# Patient Record
Sex: MALE | Race: BLACK OR AFRICAN AMERICAN | NOT HISPANIC OR LATINO | Employment: UNEMPLOYED | URBAN - METROPOLITAN AREA
[De-identification: names, ages, dates, MRNs, and addresses within clinical notes are randomized per-mention and may not be internally consistent; named-entity substitution may affect disease eponyms.]

---

## 2021-12-24 NOTE — PROGRESS NOTES
"Max Meade is a 41 year old male who is being evaluated via a billable video visit.      The patient has been notified of following:     \"This video visit will be conducted via a call between you and your physician/provider. We have found that certain health care needs can be provided without the need for an in-person physical exam.  This service lets us provide the care you need with a video conversation.  If a prescription is necessary we can send it directly to your pharmacy.  If lab work is needed we can place an order for that and you can then stop by our lab to have the test done at a later time.    Video visits are billed at different rates depending on your insurance coverage.  Please reach out to your insurance provider with any questions.    If during the course of the call the physician/provider feels a video visit is not appropriate, you will not be charged for this service.\"    Patient has given verbal consent for Video visit? Yes  How would you like to obtain your AVS? MyChart  Will anyone else be joining your video visit? No  {If patient encounters technical issues they should call [phone]      Video-Visit Details    Type of service:  Video Visit    Video Start Time: 12:28 PM  Video End Time: 1:11 PM    Originating Location (pt. Location): Home    Distant Location (provider location):  [facility] [place]     Platform used for Video Visit: [employer]    During this virtual visit the patient is located in [place], patient verifies this as the location during the entirety of this visit.     New Weight Management Nutrition Consultation    Max Meade is a 41 year old male presents today for new weight management nutrition consultation.  Patient referred by Dr. Hdz on December 27, 2021.    Patient with Co-morbidities of obesity including:  Type II DM yes (insulin dependant, managed inpatient currently)  Renal Failure no  Sleep apnea yes  Hypertension no " "  Dyslipidemia no  Joint pain yes  Back pain yes  GERD no     Anthropometrics:  Estimated body mass index is 81.19 kg/m  as calculated from the following:    Height as of an earlier encounter on 12/27/21: 1.93 m (6' 4\").    Weight as of an earlier encounter on 12/27/21: 302.5 kg (667 lb).    Medications for Weight Loss:  Pt states he discussed Ozempic with MD today.    NUTRITION HISTORY  See MD note for details.  Per questionnaire, strong emotional component to eating. Readiness and confidence to change, rated 10 (very ready).  Currently admitted (since March) at [facility] in [facility]. Preparing for weight loss surgery (pt describes the duodenal switch). Needs to lose to 580 lbs for surgery. His goal is to achieve this in next 3 months. Saw [facility] bariatric RD 6 times. Currently working with inpatient RD on weight loss. Has been following 2300 calories per day for last 3-4 months, and has not seen any wt loss, and has actually gained. Reports may be related to fluid retention.  He is getting 3 meals and 1 snack per day from the hospital. Reports no outside foods. He does not like hospital cooked veggies or greek yogurt.  Once per week slice of cake and once per day cookies.    Recent food recall:  Breakfast: Israeli toast, 1 butler, 1 sausage, 2 eggs  Lunch: Tuna sandwich + vegetables + grapes + rebecca dune cookie and chips and diet pop  Dinner: Chicken Burns + Rebecca Dune (150 ronna) chips(200 chips) + veggies (carrots and cucumber) and banana and diet pop  Snacks: vegetables and rebecca dune and diet pop  Beverages: water with crystal lite (4 cups) and diet pop (4 cups)    Alcohol: Never  Dining out: none currently     Physical Activity:  Doing PT/OT and exercise 3 times per week for 30 mins.     Nutrition Prescription  Recommended energy/nutrient modification.  1500 calories/day (per MD)    Nutrition Diagnosis  Obesity r/t long history of positive energy balance aeb BMI >30.    Nutrition " Intervention  Materials/education provided on 1500 calorie/day diet for weight loss, Volumetric eating to help satiety level on fewer calories; portion control and healthy food choices. Recommended pt focus on high-protein meals/snack (20-30 gm/meal or snack) and moderation of starches to help with weight loss and prepare for post-DS diet guidelines. Provided education on lean protein sources. Reviewed calorie goals per meal/snack to achieve 1500 calories per day diet. Pt inquiring about liquid diet for last 25 lbs of weight loss, discussed meal replacement criteria and need to discuss with provider to monitor BG and insulin needs to avoid hypoglycemia if using meal replacements.  Patient demonstrates understanding.    Expected Engagement: good  Follow-Up Plans: calorie goals     Nutrition Goals  1) Follow 1500 calorie/day plan   - 10 am: 500 calorie meal   - 2 pm: 400 calorie meal   - 5:30 pm: 400 calorie meal   - 10 pm: 200 ronna calorie snack  2) Aim for 20-30 grams protein per meal/snack  3) Continue to use only calorie-free beverages  4) Eat slowly (20-30 minutes per meal), chewing foods well (25 chews per bite/applesauce consistency)    Meal Replacement Shake Options:   *Protein Shake Criteria: no more than 210 Calories, at least 20 grams of protein, and less than 10 grams of sugar   [facility] smoothie (160 Calories, 20 g protein)   Premier Protein (160 Calories, 30 g protein)  Slim Fast Advanced Nutrition (180 Calories, 20 g protein)  Muscle Milk, lactose-free, 17 oz bottle (210 Calories, 30 g protein)  Integrated Supplements, no artificial sugars (110 Calories, 20 g protein)  Genepro, unflavored protein powder (60 Calories, 30 g protein)  Boost/Ensure Max (160 calories, 30 gm protein)   Fairlife Core Power (170 calories, 26 gm protein)    Meal Replacement Bar Options:  [facility] Protein Shake (160 Calories, 15 g protein)  Quest Protein Bars (190 Calories, 20 g protein)  Built Bar (170 Calories, 15-20 g  protein)  One Protein Bar (210 calories, 20 g protein)  [place] Signature Protein Bar ([employer]) (190 Calories, 21 g protein)  Pure Protein Bars (180 Calories, 21 g protein)      Protein Sources for Weight Loss  [email or web address]     Carbohydrates  [email or web address]     Mindful Eating  [email or web address]     Summary of Volumetrics Eating Plan  [email or web address]     Follow-Up:  1 month, PRN    Time spent with patient: 43 minutes.  Blossom Caal, JOSEPHINE, LD

## 2021-12-27 ENCOUNTER — VIRTUAL VISIT (OUTPATIENT)
Dept: ENDOCRINOLOGY | Facility: CLINIC | Age: 41
End: 2021-12-27
Payer: MEDICARE

## 2021-12-27 VITALS — HEIGHT: 76 IN | WEIGHT: 315 LBS | BODY MASS INDEX: 38.36 KG/M2

## 2021-12-27 DIAGNOSIS — E66.01 MORBID OBESITY (H): Primary | ICD-10-CM

## 2021-12-27 DIAGNOSIS — E66.9 OBESITY: ICD-10-CM

## 2021-12-27 DIAGNOSIS — Z71.3 NUTRITIONAL COUNSELING: Primary | ICD-10-CM

## 2021-12-27 PROCEDURE — 99203 OFFICE O/P NEW LOW 30 MIN: CPT | Mod: 95 | Performed by: INTERNAL MEDICINE

## 2021-12-27 PROCEDURE — 97802 MEDICAL NUTRITION INDIV IN: CPT | Mod: 95 | Performed by: DIETITIAN, REGISTERED

## 2021-12-27 RX ORDER — ATORVASTATIN CALCIUM 10 MG/1
10 TABLET, FILM COATED ORAL
COMMUNITY
Start: 2020-06-23

## 2021-12-27 RX ORDER — BUMETANIDE 1 MG/1
1 TABLET ORAL DAILY
COMMUNITY

## 2021-12-27 RX ORDER — AMOXICILLIN 250 MG
2 CAPSULE ORAL
COMMUNITY
End: 2023-05-15

## 2021-12-27 RX ORDER — LANOLIN ALCOHOL/MO/W.PET/CERES
3 CREAM (GRAM) TOPICAL
COMMUNITY
End: 2023-05-04

## 2021-12-27 RX ORDER — ALBUTEROL SULFATE 0.83 MG/ML
SOLUTION RESPIRATORY (INHALATION)
COMMUNITY
End: 2023-05-15

## 2021-12-27 RX ORDER — CYCLOBENZAPRINE HCL 5 MG
5 TABLET ORAL
COMMUNITY
Start: 2021-09-28 | End: 2022-10-03

## 2021-12-27 RX ORDER — PANTOPRAZOLE SODIUM 40 MG/1
40 TABLET, DELAYED RELEASE ORAL
COMMUNITY
End: 2023-05-15

## 2021-12-27 RX ORDER — TOPIRAMATE 25 MG/1
25 TABLET, FILM COATED ORAL
COMMUNITY
Start: 2021-09-28 | End: 2023-05-04

## 2021-12-27 RX ORDER — BUSPIRONE HYDROCHLORIDE 10 MG/1
10 TABLET ORAL
COMMUNITY
Start: 2021-09-28 | End: 2023-05-04

## 2021-12-27 RX ORDER — MINERAL OIL/HYDROPHIL PETROLAT
OINTMENT (GRAM) TOPICAL
COMMUNITY
End: 2023-05-15

## 2021-12-27 RX ORDER — BACLOFEN 20 MG
500 TABLET ORAL
COMMUNITY
Start: 2021-09-28 | End: 2023-05-15

## 2021-12-27 RX ORDER — GABAPENTIN 300 MG/1
300 CAPSULE ORAL
COMMUNITY
Start: 2021-09-28 | End: 2023-05-04

## 2021-12-27 RX ORDER — ZOLPIDEM TARTRATE 5 MG/1
5 TABLET ORAL
COMMUNITY
End: 2023-05-15

## 2021-12-27 RX ORDER — DULOXETIN HYDROCHLORIDE 60 MG/1
CAPSULE, DELAYED RELEASE ORAL
COMMUNITY
Start: 2020-11-25 | End: 2023-05-04

## 2021-12-27 RX ORDER — HYDROXYZINE PAMOATE 50 MG/1
50 CAPSULE ORAL 4 TIMES DAILY PRN
COMMUNITY

## 2021-12-27 RX ORDER — ACETAMINOPHEN 500 MG
1000 TABLET ORAL
COMMUNITY
Start: 2021-09-28 | End: 2023-05-04

## 2021-12-27 RX ORDER — LACTULOSE 10 G/15ML
60 SOLUTION ORAL
COMMUNITY
End: 2023-05-15

## 2021-12-27 RX ORDER — INSULIN GLARGINE 100 [IU]/ML
78 INJECTION, SOLUTION SUBCUTANEOUS AT BEDTIME
COMMUNITY
Start: 2021-09-28 | End: 2023-05-15

## 2021-12-27 RX ORDER — FAMOTIDINE 20 MG/1
20 TABLET, FILM COATED ORAL
COMMUNITY
End: 2023-05-04

## 2021-12-27 ASSESSMENT — PAIN SCALES - GENERAL: PAINLEVEL: SEVERE PAIN (7)

## 2021-12-27 ASSESSMENT — MIFFLIN-ST. JEOR: SCORE: 4021.99
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
## 2021-12-27 NOTE — PATIENT INSTRUCTIONS
Joe Santana,    Follow-up with RD in 1 month (1/24).     Thank you,    Blossom Caal, RD, LD  If you would like to schedule or reschedule an appointment with the RD, please call [phone]    Nutrition Goals  1) Follow 1500 calorie/day plan   - 10 am: 500 calorie meal   - 2 pm: 400 calorie meal   - 5:30 pm: 400 calorie meal   - 10 pm: 200 ronna calorie snack  2) Aim for 20-30 grams protein per meal/snack  3) Continue to use only calorie-free beverages  4) Eat slowly (20-30 minutes per meal), chewing foods well (25 chews per bite/applesauce consistency)    Protein Sources for Weight Loss  [email or web address]     Carbohydrates  [email or web address]     Mindful Eating  [email or web address]     Summary of Volumetrics Eating Plan  [email or web address]         Interested in working with a health ?  Health coaches work with you to improve your overall health and wellbeing.  They look at the whole person, and may involve discussion of different areas of life, including, but not limited to the four pillars of health (sleep, exercise, nutrition, and stress management). Discuss with your care team if you would like to start working a health .    Health Coaching-3 Pack:    $99 for three health coaching visits    Visits may be done in person or via phone    Coaching is a partnership between the  and the client; Coaches do not prescribe or diagnose    Coaching helps inspire the client to reach his/her personal goals      COMPREHENSIVE WEIGHT MANAGEMENT PROGRAM  VIRTUAL SUPPORT GROUPS    For Support Group Information:      We offer support groups for patients who are working on weight loss and considering, preparing for or have had weight loss surgery.   There is no cost for this opportunity.  You are invited to attend the?Virtual Support Groups?provided by any of the following locations:    1. [email or web address] via Microsoft Teams with Sarah Ceja RN  2.   [employer] via [employer]  "with Obed Davenport, PhD, LP  3.   [facility] via sabio [employer] Teams with Gabi Winchester RN  4.   [facility] via sabio [employer] Teams with Gabi Walters [facility]    The following Support Group information can also be found on our website:  [email or web address]      [facility] Weight Loss Surgery Support Group    [facility] Weight Loss Surgery Support Group  The support group is a patient-lead forum that meets monthly to share experiences, encouragement and education. It is open to those who have had weight loss surgery, are scheduled for surgery, and those who are considering surgery.   WHEN: This group meets on the 3rd Wednesday of each month from 5:00PM - 6:00PM virtually using Microsoft Teams.   FACILITATOR: Led by Sarah Ceja RD, LD, RN, the program's Clinical Coordinator.   TO REGISTER: Please contact the clinic via Mem[employer] or call the nurse line directly at [phone] to inform our staff that you would like an invite sent to you and to let us know the email you would like the invite sent to. Prior to the meeting, a link with directions on how to join the meeting will be sent to you.    2021 Meetings August 18: \"Let's Talk\" a time for the group to share.  September 15: \"Let's Talk\" a time for the group to share.  October 20: \"Let's Talk\" a time for the group to share.  November 17: Guest Speaker: Glo Burch RD, LD \"Protein, Metabolism and Meal Planning\"  December 15: Guest Speaker: Zay Gabriel RD, LD will speak, \"Recipe Modification\"    2022 Meetings January 19 February 16 March 16: Guest Speakers: Rosina Polanco, PhD,LP and Katrin Marie PsyD,LP  April 20  May 18  Geraldine 15    [facility] Support Groups    Connections: Bariatric Care Support Group?  This is open to all [facility] (and those external to this program) pre- and post- operative bariatric surgery patients as " "well as their support system.   WHEN: This group meets the 2nd Tuesday of each month from 6:30 PM - 8:00 PM virtually using Microsoft Teams.   FACILITATOR: Led by Obed Davenport, Ph.D who is a Licensed Psychologist with the [facility] Comprehensive Weight Management Program.   TO REGISTER: Please send an email to Obed Davenport, Ph.D., LP at?[email or web address]?if you would like an invitation to the group and to learn about using Microsoft Teams.    2021 Meetings  August 10: Open Forum  September 14: Guest Speaker: Gabi Winchester RN,CBN, CIC, [facility], \"Your Hospital Stay and Post-Operative Compliance\"  October 12: Open Forum  November 9: Guest Speaker: Jennifer Frank RD,LD, [facility],\"Holiday Eating\".  December 14: Guest Speaker Twila Valle MD, MPH, [facility], [facility], \"Body Contouring Surgery for Bariatric Surgery Patients\"     2022 Meetings  January 11  February 8  March 8  April 12  May 10  Geraldine 14    Connections: Post-Operative Bariatric Surgery Support Group  This is a support group for [facility] bariatric patients (and those external to [facility]) who have had bariatric surgery and are at least 3 months post-surgery.  WHEN: This support group meets the 4th Wednesday of the month from 11:00 AM - 12:00 PM virtually using Microsoft Teams.   FACILITATOR: Led by Certified Bariatric Nurse, Gabi Winchester RN.   TO REGISTER: Please send an email to Gabi at [email or web address] if you would like an invitation to the group and to learn about using Microsoft Teams.    2022 Meetings  January 26  February 23  March 23  April 27  May 25  Geraldine 22    [facility] Healthy Lifestyle Virtual Support Group    Healthy Lifestyle Virtual Support Group?  This is 60 minutes of small group guided discussion, support and resources. All are welcome who want " "a healthy lifestyle.  WHEN: This group meets monthly on a Friday from 12:30 PM - 1:30 PM virtually using Microsoft Teams.   FACILITATOR: Led by National Board Certified Health and , Gabi Walters [facility].   TO REGISTER: Please send an email to Gabi [email or web address] to receive monthly invites to the group or if you have any questions about having a health .  Prior to the meeting, a link with directions on how to join the meeting will be sent to you.    2021 Meetings  August 27: Open Forum  September 24: Sleep and the 7 Types of Rest  October 29: Open Forum  November 19: Gratitude     December 10: Open Forum    2022 Meetings  January 21: Laura Centeno MS, RN, CIC, CBN, \"Healthy Habits\"  February 25: Open Forum  March 18: \"Setting Limits and Boundaries\"  April 29: Ale Hu RD, \"Meal Planning Made Easy\"  May 20: Open Forum  June: To be determined          "

## 2021-12-27 NOTE — LETTER
Date:December 29, 2021      Provider requested that no letter be sent. Do not send.       [facility]

## 2021-12-27 NOTE — LETTER
Date:December 28, 2021      Patient was self referred, no letter generated. Do not send.        [facility] Information

## 2021-12-27 NOTE — PROGRESS NOTES
Max is a 41 year old who is being evaluated via a billable video visit.      How would you like to obtain your AVS? MyChart  If the video visit is dropped, the invitation should be resent by: [phone]  Will anyone else be joining your video visit? No    During this virtual visit the patient is located in [place], patient verifies this as the location during the entirety of this visit.     Video-Visit Details    Type of service:  Video Visit    Start: 12/27/2021 08:57 am  Stop: 12/27/2021 09:20 am    Originating Location (pt. Location): [facility]    Distant Location (provider location):  [facility]NA[facility] [place]     Platform used for Video Visit: ElaWell
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
## 2021-12-27 NOTE — NURSING NOTE
"(   Chief Complaint   Patient presents with     New Patient     New MWM. BMI: 77.2    )    ( Weight: (!) 302.5 kg (667 lb) (Patient reported) )  ( Height: 193 cm (6' 4\") )  ( BMI (Calculated): 81.19 )  (   )  (   )  (   )  (   )  (   )  (   )    (   )  (   )  (   )  (   )  (   )  (   )  (   )    ( There is no problem list on file for this patient.   )  (   Current Outpatient Medications   Medication Sig Dispense Refill     acetaminophen (TYLENOL) 500 MG tablet Take 1,000 mg by mouth       albuterol (PROVENTIL) (2.5 MG/3ML) 0.083% neb solution        apixaban ANTICOAGULANT (ELIQUIS) 2.5 MG tablet Take 2.5 mg by mouth       atorvastatin (LIPITOR) 10 MG tablet Take 10 mg by mouth       bumetanide (BUMEX) 1 MG tablet Take 1 mg by mouth       busPIRone (BUSPAR) 10 MG tablet Take 10 mg by mouth       cholecalciferol 25 MCG (1000 UT) TABS Take 25 mcg by mouth       cyclobenzaprine (FLEXERIL) 5 MG tablet Take 5 mg by mouth       DULoxetine (CYMBALTA) 60 MG capsule TAKE 2 CAPSULES BY MOUTH ONCE DAILY       famotidine (PEPCID) 20 MG tablet Take 20 mg by mouth       gabapentin (NEURONTIN) 300 MG capsule Take 300 mg by mouth       hydrOXYzine (VISTARIL) 25 MG capsule Take 25 mg by mouth       insulin aspart (NOVOLOG VIAL) 100 UNITS/ML vial Inject 24 Units Subcutaneous       insulin glargine (LANTUS VIAL) 100 UNIT/ML vial Inject 47 Units Subcutaneous       insulin lispro (HUMALOG VIAL) 100 UNIT/ML vial Inject 2-10 Units Subcutaneous       lactulose (CHRONULAC) 10 GM/15ML solution Take 60 mLs by mouth       Magnesium Oxide 500 MG TABS Take 500 mg by mouth       melatonin 3 MG tablet Take 3 mg by mouth       mineral oil-hydrophilic petrolatum (AQUAPHOR) external ointment        Multiple Vitamins-Minerals (MULTI VITAMIN  /MINERALS) TABS Take 1 tablet by mouth       naloxone (NARCAN) 4 MG/0.1ML nasal spray Spray 1 spray (4 mg) into one nostril 1 time; may repeat in opposite nostril in 2 minutes if person does not respond.       " pantoprazole (PROTONIX) 40 MG EC tablet Take 40 mg by mouth       senna-docusate (SENOKOT-S/PERICOLACE) 8.6-50 MG tablet Take 2 tablets by mouth       topiramate (TOPAMAX) 25 MG tablet Take 25 mg by mouth       zolpidem (AMBIEN) 5 MG tablet Take 5 mg by mouth      )  ( Diabetes Eval:    )    ( Pain Eval:  Severe Pain (7) )    ( Wound Eval:       )    (   History   Smoking Status     Never Smoker   Smokeless Tobacco     Not on file    )    ( Signed By:  Rea Multani, EMT; December 27, 2021; 8:45 AM )

## 2022-01-10 ENCOUNTER — VIRTUAL VISIT (OUTPATIENT)
Dept: ENDOCRINOLOGY | Facility: CLINIC | Age: 42
End: 2022-01-10
Payer: MEDICARE

## 2022-01-10 VITALS — WEIGHT: 315 LBS | HEIGHT: 75 IN | BODY MASS INDEX: 39.17 KG/M2

## 2022-01-10 DIAGNOSIS — E66.01 MORBID OBESITY (H): Primary | ICD-10-CM

## 2022-01-10 PROCEDURE — 99213 OFFICE O/P EST LOW 20 MIN: CPT | Mod: 95 | Performed by: INTERNAL MEDICINE

## 2022-01-10 ASSESSMENT — PATIENT HEALTH QUESTIONNAIRE - PHQ9: SUM OF ALL RESPONSES TO PHQ QUESTIONS 1-9: 10

## 2022-01-10 ASSESSMENT — PAIN SCALES - GENERAL: PAINLEVEL: SEVERE PAIN (7)

## 2022-01-10 ASSESSMENT — MIFFLIN-ST. JEOR: SCORE: 3956.23

## 2022-01-10 NOTE — NURSING NOTE
"Chief Complaint   Patient presents with     DONNELL Santana, is participating in a virtual visit today for a follow up.       Vitals:    01/10/22 1109   Weight: (!) 297.6 kg (656 lb)   Height: 1.905 m (6' 3\")       Body mass index is 81.99 kg/m .      Sudha Hoover LPN    "
Resident

## 2022-01-10 NOTE — PROGRESS NOTES
"    Return Medical Weight Management Note     Max Meade  MRN:  [id number]  :  1980  JOSEFINA:  1/10/2022    Dear Colleagues,    I had the pleasure of seeing your patient Max Meade.  He is a 41 year old male who I am continuing to see for treatment of obesity related to:     2021   I have the following health issues associated with obesity: Type II Diabetes, Sleep Apnea   I have the following symptoms associated with obesity: Knee Pain, Depression, Lower Extremity Swelling, Back Pain, Fatigue     CURRENT WEIGHT:   656 lbs 0 oz    Wt Readings from Last 4 Encounters:   01/10/22 (!) 297.6 kg (656 lb)   21 (!) 302.5 kg (667 lb)     Height:  6' 3\"  Body Mass Index:  Body mass index is 81.99 kg/m .  Vitals:  B/P: Data Unavailable, P: Data Unavailable    Initial consult weight was 667 on down 11.  Weight change since last seen on 2021 is down 11 pounds.   Total loss is 11 pounds.    INTERVAL HISTORY:  [place] has implemented 1500 calories diet now in hospital and restarted semaglutide, initally at 0.25/week.    Diet Recall Review with Patient 2021   Do you typically eat breakfast? Yes   If you do eat breakfast, what types of food do you eat? Vietnamese toast,butler, sausage, eggs   Do you typically eat lunch? Yes   If you do eat lunch, what types of food do you typically eat?  Tuna sandwich   Do you typically eat supper? Yes   If you do eat supper, what types of food do you typically eat? Chicken [place]   Do you typically eat snacks? No   Do you like vegetables?  No   Do you drink water? Yes   How many glasses of juice do you drink in a typical day? 0   How many of glasses of milk do you drink in a typical day? 0   If you do drink milk, what type? N/A   How many 8oz glasses of sugar containing drinks such as Simba-Aid/sweet tea do you drink in a day? 0   How many cans/bottles of sugar pop/soda/tea/sports drinks do you drink in a day? 0   How many cans/bottles of diet pop/soda/tea or " sports drink do you drink in a day? 7   How often do you have a drink of alcohol? Never       MEDICATIONS:   Current Outpatient Medications   Medication     albuterol (PROVENTIL) (2.5 MG/3ML) 0.083% neb solution     apixaban ANTICOAGULANT (ELIQUIS) 2.5 MG tablet     atorvastatin (LIPITOR) 10 MG tablet     bumetanide (BUMEX) 1 MG tablet     busPIRone (BUSPAR) 10 MG tablet     cholecalciferol 25 MCG (1000 UT) TABS     cyclobenzaprine (FLEXERIL) 5 MG tablet     DULoxetine (CYMBALTA) 60 MG capsule     famotidine (PEPCID) 20 MG tablet     gabapentin (NEURONTIN) 300 MG capsule     hydrOXYzine (VISTARIL) 25 MG capsule     insulin aspart (NOVOLOG VIAL) 100 UNITS/ML vial     insulin glargine (LANTUS VIAL) 100 UNIT/ML vial     insulin lispro (HUMALOG VIAL) 100 UNIT/ML vial     lactulose (CHRONULAC) 10 GM/15ML solution     Magnesium Oxide 500 MG TABS     melatonin 3 MG tablet     mineral oil-hydrophilic petrolatum (AQUAPHOR) external ointment     Multiple Vitamins-Minerals (MULTI VITAMIN  /MINERALS) TABS     naloxone (NARCAN) 4 MG/0.1ML nasal spray     pantoprazole (PROTONIX) 40 MG EC tablet     senna-docusate (SENOKOT-S/PERICOLACE) 8.6-50 MG tablet     topiramate (TOPAMAX) 25 MG tablet     zolpidem (AMBIEN) 5 MG tablet     acetaminophen (TYLENOL) 500 MG tablet     No current facility-administered medications for this visit.     No flowsheet data found.    ASSESSMENT:   Maintain 1500 calories diet in hospital and increase semaglutide to 0.5/week, increase to 1 mg/week asap. Will discuss barisurgery with surgery colleagues, but likely will need some weight loss before can consider surgery.    FOLLOW-UP:    12 weeks.    Sincerely,  Erlin Hdz MD

## 2022-01-10 NOTE — LETTER
"1/10/2022       RE: Max Meade  [address]  [place] [address]     Dear Colleague,    Thank you for referring your patient, Max Meade, to the [facility] [place] at [facility]. Please see a copy of my visit note below.    Max is a 41 year old who is being evaluated via a billable video visit.      How would you like to obtain your AVS? MyChart  If the video visit is dropped, the invitation should be resent by: Text to cell phone: [phone]  Will anyone else be joining your video visit? No     Video-Visit Details    Type of service:  Video Visit    Start: 01/10/2022 11:17 am  Stop: 01/10/2022 11:30 am    Originating Location (pt. Location): [facility]    Distant Location (provider location):  [facility] [place]     Platform used for Video Visit: BAROnova          [facility] Medical Weight Management Note     Max Meade  MRN:  [id number]  :  1980  JOSEFINA:  1/10/2022    Dear Colleagues,    I had the pleasure of seeing your patient Max Meade.  He is a 41 year old male who I am continuing to see for treatment of obesity related to:     2021   I have the following health issues associated with obesity: Type II Diabetes, Sleep Apnea   I have the following symptoms associated with obesity: Knee Pain, Depression, Lower Extremity Swelling, Back Pain, Fatigue     CURRENT WEIGHT:   656 lbs 0 oz    Wt Readings from Last 4 Encounters:   01/10/22 (!) 297.6 kg (656 lb)   21 (!) 302.5 kg (667 lb)     Height:  6' 3\"  Body Mass Index:  Body mass index is 81.99 kg/m .  Vitals:  B/P: Data Unavailable, P: Data Unavailable    Initial consult weight was 667 on down 11.  Weight change since last seen on 2021 is down 11 pounds.   Total loss is 11 pounds.    INTERVAL HISTORY:  [place] has implemented 1500 calories diet now in hospital and " restarted semaglutide, initally at 0.25/week.    Diet Recall Review with Patient 12/21/2021   Do you typically eat breakfast? Yes   If you do eat breakfast, what types of food do you eat? Vietnamese toast,butler, sausage, eggs   Do you typically eat lunch? Yes   If you do eat lunch, what types of food do you typically eat?  Tuna sandwich   Do you typically eat supper? Yes   If you do eat supper, what types of food do you typically eat? Chicken [place]   Do you typically eat snacks? No   Do you like vegetables?  No   Do you drink water? Yes   How many glasses of juice do you drink in a typical day? 0   How many of glasses of milk do you drink in a typical day? 0   If you do drink milk, what type? N/A   How many 8oz glasses of sugar containing drinks such as Simba-Aid/sweet tea do you drink in a day? 0   How many cans/bottles of sugar pop/soda/tea/sports drinks do you drink in a day? 0   How many cans/bottles of diet pop/soda/tea or sports drink do you drink in a day? 7   How often do you have a drink of alcohol? Never       MEDICATIONS:   Current Outpatient Medications   Medication     albuterol (PROVENTIL) (2.5 MG/3ML) 0.083% neb solution     apixaban ANTICOAGULANT (ELIQUIS) 2.5 MG tablet     atorvastatin (LIPITOR) 10 MG tablet     bumetanide (BUMEX) 1 MG tablet     busPIRone (BUSPAR) 10 MG tablet     cholecalciferol 25 MCG (1000 UT) TABS     cyclobenzaprine (FLEXERIL) 5 MG tablet     DULoxetine (CYMBALTA) 60 MG capsule     famotidine (PEPCID) 20 MG tablet     gabapentin (NEURONTIN) 300 MG capsule     hydrOXYzine (VISTARIL) 25 MG capsule     insulin aspart (NOVOLOG VIAL) 100 UNITS/ML vial     insulin glargine (LANTUS VIAL) 100 UNIT/ML vial     insulin lispro (HUMALOG VIAL) 100 UNIT/ML vial     lactulose (CHRONULAC) 10 GM/15ML solution     Magnesium Oxide 500 MG TABS     melatonin 3 MG tablet     mineral oil-hydrophilic petrolatum (AQUAPHOR) external ointment     Multiple Vitamins-Minerals (MULTI VITAMIN  /MINERALS) TABS      naloxone (NARCAN) 4 MG/0.1ML nasal spray     pantoprazole (PROTONIX) 40 MG EC tablet     senna-docusate (SENOKOT-S/PERICOLACE) 8.6-50 MG tablet     topiramate (TOPAMAX) 25 MG tablet     zolpidem (AMBIEN) 5 MG tablet     acetaminophen (TYLENOL) 500 MG tablet     No current facility-administered medications for this visit.     No flowsheet data found.    ASSESSMENT:   Maintain 1500 calories diet in hospital and increase semaglutide to 0.5/week, increase to 1 mg/week asap. Will discuss barisurgery with surgery colleagues, but likely will need some weight loss before can consider surgery.    FOLLOW-UP:    12 weeks.    Sincerely,  Erlin Hdz MD      Again, thank you for allowing me to participate in the care of your patient.      Sincerely,    Erlin Hdz MD

## 2022-01-10 NOTE — PROGRESS NOTES
Max is a 41 year old who is being evaluated via a billable video visit.      How would you like to obtain your AVS? Blaasthart  If the video visit is dropped, the invitation should be resent by: Text to cell phone: [phone]  Will anyone else be joining your video visit? No     Video-Visit Details    Type of service:  Video Visit    Start: 01/10/2022 11:17 am  Stop: 01/10/2022 11:30 am    Originating Location (pt. Location): [facility]    Distant Location (provider location):  [facility] [place]     Platform used for Video Visit: Liveclubs

## 2022-01-21 NOTE — PROGRESS NOTES
"Max Meade is a 41 year old male who is being evaluated via a billable video visit.      The patient has been notified of following:     \"This video visit will be conducted via a call between you and your physician/provider. We have found that certain health care needs can be provided without the need for an in-person physical exam.  This service lets us provide the care you need with a video conversation.  If a prescription is necessary we can send it directly to your pharmacy.  If lab work is needed we can place an order for that and you can then stop by our lab to have the test done at a later time.    Video visits are billed at different rates depending on your insurance coverage.  Please reach out to your insurance provider with any questions.    If during the course of the call the physician/provider feels a video visit is not appropriate, you will not be charged for this service.\"    Patient has given verbal consent for Video visit? Yes  How would you like to obtain your AVS? MyChart  Will anyone else be joining your video visit? No  {If patient encounters technical issues they should call [phone]      Video-Visit Details    Type of service:  Video Visit    Video Start Time: 12:35 PM  Video End Time: 1:05 PM    Originating Location (pt. Location): Home    Distant Location (provider location):  [facility] [place]     Platform used for Video Visit: Platogo    During this virtual visit the patient is located in [place], patient verifies this as the location during the entirety of this visit.     Weight Management Nutrition Consultation    Max Meade is a 41 year old male presents today for return weight management nutrition consultation.  Patient referred by Dr. Hdz on December 27, 2021.    Patient with Co-morbidities of obesity including:  Type II DM yes (insulin dependant, managed inpatient currently)  Renal Failure no  Sleep apnea yes  Hypertension no " "  Dyslipidemia no  Joint pain yes  Back pain yes  GERD no     Anthropometrics:  Admitted in March. Notes he started working on weight loss at this time. Weight per care everywhere:   321 kg (707 lb 10.8 oz) 03/18/2021      Per notes from [facility] RD:  12/13/21 (!) 293.8 kg (647 lb 12.8 oz)   12/08/21 (!) 287.9 kg (634 lb 12.8 oz)   09/27/21 (!) 292.2 kg (644 lb 3.2 oz)     Estimated body mass index is 81.19 kg/m  as calculated from the following:    Height as of an earlier encounter on 12/27/21: 1.93 m (6' 4\").    Weight as of an earlier encounter on 12/27/21: 302.5 kg (667 lb).    Current weight (per pt report): 648 lbs (-19 lbs over the past month)    Medications for Weight Loss:  Ozempic     NUTRITION HISTORY  See MD note for details.  Per questionnaire, strong emotional component to eating. Readiness and confidence to change, rated 10 (very ready).    Saw [facility] bariat[facility] RD 6 times, last visit 12/16/21. Per last note - Preparing for weight loss surgery. Needs to lose to 540 lbs for surgery (-70 lbs from initial consult). Pt states his goal is to achieve this in next 3 months.    Currently admitted (since March) at [facility] in [facility] and working with inpatient RD on weight loss. Had been encouraged to follow 2300 calories/day for last 3-4 months, and had not seen any wt loss, and has actually gained (~20 lbs). Reports may be related to fluid retention.    He is getting 3 meals and 1 snack per day from the hospital. Reports no outside foods. He does not like hospital cooked veggies or greek yogurt.  Once per week slice of cake and once per day cookies.  Does not tolerate regular milk.     Today - Pt reports he has lost 19 lbs since initial visit. He was following 1500 calories/day until 1/11, then started following 1350 calories/day. Repots no outside food from hospital meals and snacks. Rolling and moving better. Is able to stand for a minute which he was not doing " before. Doing arm movements while in bed. He is interested in weight loss surgery consult with [facility].     1350 calories/day:  Breakfast: 3 pc Luxembourgish toast + 2 eggs + 1 pc sausage + 1 pc butler  12: fruit  2pm: protein shake with grapes  4pm: veggies  Dinner: protein shake with veggies  11 pm: veggies with string cheese  Beverages: water/crystallite (5-6 x 8 oz/day)      Progress Towards Previous Goals:  1) Follow 1500 calorie/day plan - Pt reports he has been following since initial and even decreased to 1350 a couple weeks ago.   - 10 am: 500 calorie meal   - 2 pm: 400 calorie meal   - 5:30 pm: 400 calorie meal   - 10 pm: 200 ronna calorie snack  2) Aim for 20-30 grams protein per meal/snack - Met, continues  3) Continue to use only calorie-free beverages - Met, continues   4) Eat slowly (20-30 minutes per meal), chewing foods well (25 chews per bite/applesauce consistency) - Met, continues      Physical Activity:  Doing PT/OT and exercise 3 times per week for 30 mins. Doing seated exercises while in bed.     Nutrition Prescription  Recommended energy/nutrient modification.  1500 calories/day (per MD)    Nutrition Diagnosis  Obesity r/t long history of positive energy balance aeb BMI >30.    Nutrition Intervention  Materials/education provided:  - Reviewed progress towards previous goals  - Reviewed bariatric diet guidelines - eating/drinking pace, portion control, protein needs, vitamin/mineral supplement needs.    - Encouraged increased fluid intake  - Praised pt on weight loss this past month and consistency with hypocaloric diet.   Patient demonstrates understanding.    Expected Engagement: good  Follow-Up Plans: calorie goals     Nutrition Goals  1) Follow 1350 calorie/day plan   - Continue to replace 2 meals per day with low-calorie protein shake.  2) Aim for 20-30 grams protein per meal/snack  3) Continue to use only calorie-free beverages. Consume 64+ oz fluid/day.   4) Eat slowly (20-30 minutes per meal),  chewing foods well (25 chews per bite/applesauce consistency)    Meal Replacement Shake Options:   *Protein Shake Criteria: no more than 210 Calories, at least 20 grams of protein, and less than 10 grams of sugar   [facility] smoothie (160 Calories, 20 g protein)   Premier Protein (160 Calories, 30 g protein)  Slim Fast Advanced Nutrition (180 Calories, 20 g protein)  Muscle Milk, lactose-free, 17 oz bottle (210 Calories, 30 g protein)  Integrated Supplements, no artificial sugars (110 Calories, 20 g protein)  Genepro, unflavored protein powder (60 Calories, 30 g protein)  Boost/Ensure Max (160 calories, 30 gm protein)   [employer] Core Power (170 calories, 26 gm protein)    Meal Replacement Bar Options:  [facility] Protein Shake (160 Calories, 15 g protein)  Quest Protein Bars (190 Calories, 20 g protein)  Built Bar (170 Calories, 15-20 g protein)  One Protein Bar (210 calories, 20 g protein)  [employer]man Signature Protein Bar ([employer]) (190 Calories, 21 g protein)  Pure Protein Bars (180 Calories, 21 g protein)      Protein Sources for Weight Loss  [email or web address]     Carbohydrates  [email or web address]     Mindful Eating  [email or web address]     Diet Guidelines after Weight Loss Surgery  [email or web address]     Seated Exercises for Arms and Legs (can be done before or after surgery)  [email or web address]      Follow-Up:  1 month, PRN    Time spent with patient: 30 minutes.  Blossom Caal, RD, LD

## 2022-01-24 ENCOUNTER — VIRTUAL VISIT (OUTPATIENT)
Dept: ENDOCRINOLOGY | Facility: CLINIC | Age: 42
End: 2022-01-24
Payer: MEDICARE

## 2022-01-24 DIAGNOSIS — Z71.3 NUTRITIONAL COUNSELING: Primary | ICD-10-CM

## 2022-01-24 DIAGNOSIS — E66.9 OBESITY: ICD-10-CM

## 2022-01-24 PROCEDURE — 97803 MED NUTRITION INDIV SUBSEQ: CPT | Mod: 95 | Performed by: DIETITIAN, REGISTERED

## 2022-01-24 NOTE — LETTER
"1/24/2022       RE: Max Meade  [address]  [place] [address]     Dear Colleague,    Thank you for referring your patient, Max Meade, to the [facility] [place] at [facility]. Please see a copy of my visit note below.    Max Meade is a 41 year old male who is being evaluated via a billable video visit.      The patient has been notified of following:     \"This video visit will be conducted via a call between you and your physician/provider. We have found that certain health care needs can be provided without the need for an in-person physical exam.  This service lets us provide the care you need with a video conversation.  If a prescription is necessary we can send it directly to your pharmacy.  If lab work is needed we can place an order for that and you can then stop by our lab to have the test done at a later time.    Video visits are billed at different rates depending on your insurance coverage.  Please reach out to your insurance provider with any questions.    If during the course of the call the physician/provider feels a video visit is not appropriate, you will not be charged for this service.\"    Patient has given verbal consent for Video visit? Yes  How would you like to obtain your AVS? MyChart  Will anyone else be joining your video visit? No  {If patient encounters technical issues they should call [phone]      Video-Visit Details    Type of service:  Video Visit    Video Start Time: 12:35 PM  Video End Time: 1:05 PM    Originating Location (pt. Location): Home    Distant Location (provider location):  [facility] [place]     Platform used for Video Visit: [employer]    During this virtual visit the patient is located in [place], patient verifies this as the location during the entirety of this visit.     Weight Management Nutrition " "Consultation    Max Meade is a 41 year old male presents today for return weight management nutrition consultation.  Patient referred by Dr. Hdz on December 27, 2021.    Patient with Co-morbidities of obesity including:  Type II DM yes (insulin dependant, managed inpatient currently)  Renal Failure no  Sleep apnea yes  Hypertension no   Dyslipidemia no  Joint pain yes  Back pain yes  GERD no     Anthropometrics:  Admitted in March. Notes he started working on weight loss at this time. Weight per care everywhere:   321 kg (707 lb 10.8 oz) 03/18/2021      Per notes from [facility] Bariatric RD:  12/13/21 (!) 293.8 kg (647 lb 12.8 oz)   12/08/21 (!) 287.9 kg (634 lb 12.8 oz)   09/27/21 (!) 292.2 kg (644 lb 3.2 oz)     Estimated body mass index is 81.19 kg/m  as calculated from the following:    Height as of an earlier encounter on 12/27/21: 1.93 m (6' 4\").    Weight as of an earlier encounter on 12/27/21: 302.5 kg (667 lb).    Current weight (per pt report): 648 lbs (-19 lbs over the past month)    Medications for Weight Loss:  Ozempic     NUTRITION HISTORY  See MD note for details.  Per questionnaire, strong emotional component to eating. Readiness and confidence to change, rated 10 (very ready).    Saw [facility] bariatric RD 6 times, last visit 12/16/21. Per last note - Preparing for weight loss surgery. Needs to lose to 540 lbs for surgery (-70 lbs from initial consult). Pt states his goal is to achieve this in next 3 months.    Currently admitted (since March) at [facility] in [facility] and working with inpatient RD on weight loss. Had been encouraged to follow 2300 calories/day for last 3-4 months, and had not seen any wt loss, and has actually gained (~20 lbs). Reports may be related to fluid retention.    He is getting 3 meals and 1 snack per day from the hospital. Reports no outside foods. He does not like hospital cooked veggies or greek yogurt.  Once per week slice of cake " and once per day cookies.  Does not tolerate regular milk.     Today - Pt reports he has lost 19 lbs since initial visit. He was following 1500 calories/day until 1/11, then started following 1350 calories/day. Repots no outside food from hospital meals and snacks. Rolling and moving better. Is able to stand for a minute which he was not doing before. Doing arm movements while in bed. He is interested in weight loss surgery consult with [facility].     1350 calories/day:  Breakfast: 3 pc Montserratian toast + 2 eggs + 1 pc sausage + 1 pc butler  12: fruit  2pm: protein shake with grapes  4pm: veggies  Dinner: protein shake with veggies  11 pm: veggies with string cheese  Beverages: water/crystallite (5-6 x 8 oz/day)      Progress Towards Previous Goals:  1) Follow 1500 calorie/day plan - Pt reports he has been following since initial and even decreased to 1350 a couple weeks ago.   - 10 am: 500 calorie meal   - 2 pm: 400 calorie meal   - 5:30 pm: 400 calorie meal   - 10 pm: 200 ronna calorie snack  2) Aim for 20-30 grams protein per meal/snack - Met, continues  3) Continue to use only calorie-free beverages - Met, continues   4) Eat slowly (20-30 minutes per meal), chewing foods well (25 chews per bite/applesauce consistency) - Met, continues      Physical Activity:  Doing PT/OT and exercise 3 times per week for 30 mins. Doing seated exercises while in bed.     Nutrition Prescription  Recommended energy/nutrient modification.  1500 calories/day (per MD)    Nutrition Diagnosis  Obesity r/t long history of positive energy balance aeb BMI >30.    Nutrition Intervention  Materials/education provided:  - Reviewed progress towards previous goals  - Reviewed bariatric diet guidelines - eating/drinking pace, portion control, protein needs, vitamin/mineral supplement needs.    - Encouraged increased fluid intake  - Praised pt on weight loss this past month and consistency with hypocaloric diet.   Patient demonstrates  understanding.    Expected Engagement: good  Follow-Up Plans: calorie goals     Nutrition Goals  1) Follow 1350 calorie/day plan   - Continue to replace 2 meals per day with low-calorie protein shake.  2) Aim for 20-30 grams protein per meal/snack  3) Continue to use only calorie-free beverages. Consume 64+ oz fluid/day.   4) Eat slowly (20-30 minutes per meal), chewing foods well (25 chews per bite/applesauce consistency)    Meal Replacement Shake Options:   *Protein Shake Criteria: no more than 210 Calories, at least 20 grams of protein, and less than 10 grams of sugar   [facility] smoothie (160 Calories, 20 g protein)   Premier Protein (160 Calories, 30 g protein)  Slim Fast Advanced Nutrition (180 Calories, 20 g protein)  Muscle Milk, lactose-free, 17 oz bottle (210 Calories, 30 g protein)  Integrated Supplements, no artificial sugars (110 Calories, 20 g protein)  Genepro, unflavored protein powder (60 Calories, 30 g protein)  Boost/Ensure Max (160 calories, 30 gm protein)   inSilica Core Power (170 calories, 26 gm protein)    Meal Replacement Bar Options:   Health [facility] Protein Shake (160 Calories, 15 g protein)  Quest Protein Bars (190 Calories, 20 g protein)  Built Bar (170 Calories, 15-20 g protein)  One Protein Bar (210 calories, 20 g protein)  [place] Signature Protein Bar ([employer]) (190 Calories, 21 g protein)  Pure Protein Bars (180 Calories, 21 g protein)      Protein Sources for Weight Loss  [email or web address]     Carbohydrates  [email or web address]     Mindful Eating  [email or web address]     Diet Guidelines after Weight Loss Surgery  [email or web address]     Seated Exercises for Arms and Legs (can be done before or after surgery)  [email or web address]      Follow-Up:  1 month, PRN    Time spent with patient: 30 minutes.  Blossom Caal, RD, LD          Again, thank you for allowing me to participate in the care of your patient.       Sincerely,    Blossom Caal RD

## 2022-01-24 NOTE — LETTER
Date:January 25, 2022      Patient was self referred, no letter generated. Do not send.        [facility] Health Information

## 2022-01-25 NOTE — PATIENT INSTRUCTIONS
Joe Santana,    Follow-up with RD in 1 month.     Thank you,    Blossom Caal, RD, LD  If you would like to schedule or reschedule an appointment with the RD, please call [phone]    Nutrition Goals  1) Follow 1350 calorie/day plan   - Continue to replace 2 meals per day with low-calorie protein shake.  2) Aim for 20-30 grams protein per meal/snack  3) Continue to use only calorie-free beverages. Consume 64+ oz fluid/day.   4) Eat slowly (20-30 minutes per meal), chewing foods well (25 chews per bite/applesauce consistency)    Meal Replacement Shake Options:   *Protein Shake Criteria: no more than 210 Calories, at least 20 grams of protein, and less than 10 grams of sugar   [facility] smoothie (160 Calories, 20 g protein)   Premier Protein (160 Calories, 30 g protein)  Slim Fast Advanced Nutrition (180 Calories, 20 g protein)  Muscle Milk, lactose-free, 17 oz bottle (210 Calories, 30 g protein)  Integrated Supplements, no artificial sugars (110 Calories, 20 g protein)  Genepro, unflavored protein powder (60 Calories, 30 g protein)  Boost/Ensure Max (160 calories, 30 gm protein)   Gumhouse Core Power (170 calories, 26 gm protein)    Meal Replacement Bar Options:  [facility] Protein Shake (160 Calories, 15 g protein)  Quest Protein Bars (190 Calories, 20 g protein)  Built Bar (170 Calories, 15-20 g protein)  One Protein Bar (210 calories, 20 g protein)  [place] Signature Protein Bar ([employer]) (190 Calories, 21 g protein)  Pure Protein Bars (180 Calories, 21 g protein)      Protein Sources for Weight Loss  [email or web address]     Carbohydrates  [email or web address]     Mindful Eating  [email or web address]     Diet Guidelines after Weight Loss Surgery  [email or web address]     Seated Exercises for Arms and Legs (can be done before or after surgery)  [email or web address]    Interested in working with a health ?  Health coaches work with you to improve your  overall health and wellbeing.  They look at the whole person, and may involve discussion of different areas of life, including, but not limited to the four pillars of health (sleep, exercise, nutrition, and stress management). Discuss with your care team if you would like to start working a health .    Health Coaching-3 Pack:    $99 for three health coaching visits    Visits may be done in person or via phone    Coaching is a partnership between the  and the client; Coaches do not prescribe or diagnose    Coaching helps inspire the client to reach his/her personal goals      COMPREHENSIVE WEIGHT MANAGEMENT PROGRAM  VIRTUAL SUPPORT GROUPS    For Support Group Information:      We offer support groups for patients who are working on weight loss and considering, preparing for or have had weight loss surgery.   There is no cost for this opportunity.  You are invited to attend the?Virtual Support Groups?provided by any of the following locations:    1. [facility] via Microsoft Teams with Sarah Ceja RN  2.   [place] via [employer] with Obed Davenport, PhD, LP  3.   [place] via [employer] with Gabi Winchester RN  4.   [facility] via Microsoft Teams with Gabi Walters On license of [facility]    The following Support Group information can also be found on our website:  [email or web address]      [facility] Weight Loss Surgery Support Group    [facility] Weight Loss Surgery Support Group  The support group is a patient-lead forum that meets monthly to share experiences, encouragement and education. It is open to those who have had weight loss surgery, are scheduled for surgery, and those who are considering surgery.   WHEN: This group meets on the 3rd Wednesday of each month from 5:00PM - 6:00PM virtually using Microsoft Teams.   FACILITATOR: Led by Sarah Ceja, RD, LD, RN, the program's Clinical Coordinator.   TO REGISTER:  "Please contact the clinic via [employer] or call the nurse line directly at [phone] to inform our staff that you would like an invite sent to you and to let us know the email you would like the invite sent to. Prior to the meeting, a link with directions on how to join the meeting will be sent to you.    2022 Meetings  January 19: \"Let's Talk\" a time for the group to share.  February 16: \"Let's Talk\" a time for the group to share.  March 16: Guest Speakers: Psychologists, Rosina Polanco, PhD,LP and Katrin Marie PsyD,  April 20: Guest Speaker: Health , Jacquelin Churchill, [facility],CHES, [facility]  May 18: Guest Speaker: DietitianZay, JOSEPHINE,   Geraldine 15: \"Let's Talk\" a time for the group to share.  July 20: \"Let's Talk\" a time for the group to share.  August 17: TBA  September 21: TBA  October 19: Guest Speaker: Dr Geovany Amezcua MD Pulmonologist and Sleep Medicine Physician, \"Getting a Good Night's Sleep\".  November 16: TBA  December 21: TBA    [facility] Support Groups    Connections: Bariatric Care Support Group?  This is open to all [facility] (and those external to this program) pre- and post- operative bariatric surgery patients as well as their support system.   WHEN: This group meets the 2nd Tuesday of each month from 6:30 PM - 8:00 PM virtually using Microsoft Teams.   FACILITATOR: Led by Obed Davenport, Ph.D who is a Licensed Psychologist with the [facility] Comprehensive Weight Management Program.   TO REGISTER: Please send an email to Obed Davenport, Ph.D.,  at?[email or web address]?if you would like an invitation to the group and to learn about using Microsoft Teams.    2022 Meetings  January 11: Rupa Melgar, PharmD, Pharmacy Resident at [facility], \"Medications and Bariatric Surgery\".  February 8: Open Forum  March 8  April 12  May 10  Geraldine 14    Connections: Post-Operative Bariatric Surgery Support Group  This is a support group for " "[facility] bariatric patients (and those external to [facility]) who have had bariatric surgery and are at least 3 months post-surgery.  WHEN: This support group meets the 4th Wednesday of the month from 11:00 AM - 12:00 PM virtually using Microsoft Teams.   FACILITATOR: Led by Certified Bariatric Nurse, Gabi Winchester RN.   TO REGISTER: Please send an email to Gabi at [email or web address] if you would like an invitation to the group and to learn about using Microsoft Teams.    2022 Meetings  January 26  February 23  March 23  April 27  May 25  Geraldine 22    [facility] Healthy Lifestyle Virtual Support Group    Healthy Lifestyle Virtual Support Group?  This is 60 minutes of small group guided discussion, support and resources. All are welcome who want a healthy lifestyle.  WHEN: This group meets monthly on a Friday from 12:30 PM - 1:30 PM virtually using Microsoft Teams.   FACILITATOR: Led by National Board Certified Health and , Gabi Walters [facility].   TO REGISTER: Please send an email to Gabi [email or web address] to receive monthly invites to the group or if you have any questions about having a health .  Prior to the meeting, a link with directions on how to join the meeting will be sent to you.    2022 Meetings  January 21: Laura Centeno MS, RN, CIC, CBN, \"Healthy Habits\"  February 25: Open Forum  March 18: \"Setting Limits and Boundaries\"  April 29: Ale Hu RD, \"Meal Planning Made Easy\"  May 20: Open Forum  June: To be determined          "

## 2022-01-31 ENCOUNTER — VIRTUAL VISIT (OUTPATIENT)
Dept: ENDOCRINOLOGY | Facility: CLINIC | Age: 42
End: 2022-01-31
Payer: MEDICARE

## 2022-01-31 VITALS — HEIGHT: 76 IN | WEIGHT: 315 LBS | BODY MASS INDEX: 38.36 KG/M2

## 2022-01-31 DIAGNOSIS — E66.01 MORBID OBESITY (H): Primary | ICD-10-CM

## 2022-01-31 PROCEDURE — 99213 OFFICE O/P EST LOW 20 MIN: CPT | Mod: 95 | Performed by: INTERNAL MEDICINE

## 2022-01-31 ASSESSMENT — PAIN SCALES - GENERAL: PAINLEVEL: SEVERE PAIN (7)

## 2022-01-31 ASSESSMENT — MIFFLIN-ST. JEOR: SCORE: 3953.95

## 2022-01-31 NOTE — PROGRESS NOTES
"    Return Medical Weight Management Note     Max Meade  MRN:  [id number]  :  1980  JOSEFINA:  1/10/2022    Dear Colleagues,    I had the pleasure of seeing your patient Max Meade.  He is a 41 year old male who I am continuing to see for treatment of obesity related to:     2021   I have the following health issues associated with obesity: Type II Diabetes, Sleep Apnea   I have the following symptoms associated with obesity: Knee Pain, Depression, Lower Extremity Swelling, Back Pain, Fatigue     CURRENT WEIGHT:   652 lbs 0 oz    Wt Readings from Last 4 Encounters:   22 (!) 295.7 kg (652 lb)   01/10/22 (!) 297.6 kg (656 lb)   21 (!) 302.5 kg (667 lb)     Height:  6' 4\"  Body Mass Index:  Body mass index is 79.36 kg/m .  Vitals:  B/P: Data Unavailable, P: Data Unavailable    Initial consult weight was 667 on down 11.  Weight change since last seen on 1/10/2022 is down 4 pounds.   Total loss is 15 pounds.    INTERVAL HISTORY:  Says now following 1300 calories diet in hospital and denies any outside food. Now semaglutide, now at 0.5/week.    Diet Recall Review with Patient 2021   Do you typically eat breakfast? Yes   If you do eat breakfast, what types of food do you eat? Scottish toast,butler, sausage, eggs   Do you typically eat lunch? Yes   If you do eat lunch, what types of food do you typically eat?  Tuna sandwich   Do you typically eat supper? Yes   If you do eat supper, what types of food do you typically eat? Chicken [place]   Do you typically eat snacks? No   Do you like vegetables?  No   Do you drink water? Yes   How many glasses of juice do you drink in a typical day? 0   How many of glasses of milk do you drink in a typical day? 0   If you do drink milk, what type? N/A   How many 8oz glasses of sugar containing drinks such as Simba-Aid/sweet tea do you drink in a day? 0   How many cans/bottles of sugar pop/soda/tea/sports drinks do you drink in a day? 0   How many " cans/bottles of diet pop/soda/tea or sports drink do you drink in a day? 7   How often do you have a drink of alcohol? Never     MEDICATIONS:   Current Outpatient Medications   Medication     albuterol (PROVENTIL) (2.5 MG/3ML) 0.083% neb solution     apixaban ANTICOAGULANT (ELIQUIS) 2.5 MG tablet     atorvastatin (LIPITOR) 10 MG tablet     bumetanide (BUMEX) 1 MG tablet     busPIRone (BUSPAR) 10 MG tablet     cholecalciferol 25 MCG (1000 UT) TABS     cyclobenzaprine (FLEXERIL) 5 MG tablet     DULoxetine (CYMBALTA) 60 MG capsule     famotidine (PEPCID) 20 MG tablet     gabapentin (NEURONTIN) 300 MG capsule     hydrOXYzine (VISTARIL) 25 MG capsule     insulin aspart (NOVOLOG VIAL) 100 UNITS/ML vial     insulin glargine (LANTUS VIAL) 100 UNIT/ML vial     insulin lispro (HUMALOG VIAL) 100 UNIT/ML vial     lactulose (CHRONULAC) 10 GM/15ML solution     Magnesium Oxide 500 MG TABS     melatonin 3 MG tablet     mineral oil-hydrophilic petrolatum (AQUAPHOR) external ointment     Multiple Vitamins-Minerals (MULTI VITAMIN  /MINERALS) TABS     naloxone (NARCAN) 4 MG/0.1ML nasal spray     pantoprazole (PROTONIX) 40 MG EC tablet     senna-docusate (SENOKOT-S/PERICOLACE) 8.6-50 MG tablet     topiramate (TOPAMAX) 25 MG tablet     zolpidem (AMBIEN) 5 MG tablet     acetaminophen (TYLENOL) 500 MG tablet     No current facility-administered medications for this visit.     Weight Loss Medication History Reviewed With Patient 1/31/2022   Which weight loss medications are you currently taking on a regular basis?  Topamax (topiramate)   Are you having any side effects from the weight loss medication that we have prescribed you? No     ASSESSMENT:   Maintain 1300 calories diet in hospital and increase semaglutide to 1 mg/week, add topiramate (nursing says not now on it) in taper up. Will again discuss barisurgery with surgery colleagues, but likely will need some weight loss before can consider surgery.    FOLLOW-UP:    12  weeks.    Sincerely,  Erlin Hdz MD

## 2022-01-31 NOTE — PROGRESS NOTES
Max is a 41 year old who is being evaluated via a billable video visit.      How would you like to obtain your AVS? MyChart  If the video visit is dropped, the invitation should be resent by: [phone]  Will anyone else be joining your video visit? No  During this virtual visit the patient is located in [place], patient verifies this as the location during the entirety of this visit.     Video-Visit Details    Type of service:  Video Visit    Start: 01/31/2022 11:07 am  Stop: 01/31/2022 11:29 am    Originating Location (pt. Location): Home    Distant Location (provider location):  [facility] [place]     Platform used for Video Visit: Audiosocket

## 2022-01-31 NOTE — NURSING NOTE
"(   Chief Complaint   Patient presents with     RECHECK     Return MW    )    ( Weight: (!) 295.7 kg (652 lb) (Patient reported) )  ( Height: 193 cm (6' 4\") )  ( BMI (Calculated): 79.36 )  (   )  (   )  (   )  (   )  (   )  (   )    (   )  (   )  (   )  (   )  (   )  (   )  (   )    (   Patient Active Problem List   Diagnosis     Morbid obesity (H)    )  (   Current Outpatient Medications   Medication Sig Dispense Refill     albuterol (PROVENTIL) (2.5 MG/3ML) 0.083% neb solution        apixaban ANTICOAGULANT (ELIQUIS) 2.5 MG tablet Take 2.5 mg by mouth       atorvastatin (LIPITOR) 10 MG tablet Take 10 mg by mouth       bumetanide (BUMEX) 1 MG tablet Take 1 mg by mouth       busPIRone (BUSPAR) 10 MG tablet Take 10 mg by mouth       cholecalciferol 25 MCG (1000 UT) TABS Take 25 mcg by mouth       cyclobenzaprine (FLEXERIL) 5 MG tablet Take 5 mg by mouth       DULoxetine (CYMBALTA) 60 MG capsule TAKE 2 CAPSULES BY MOUTH ONCE DAILY       famotidine (PEPCID) 20 MG tablet Take 20 mg by mouth       gabapentin (NEURONTIN) 300 MG capsule Take 300 mg by mouth       hydrOXYzine (VISTARIL) 25 MG capsule Take 25 mg by mouth       insulin aspart (NOVOLOG VIAL) 100 UNITS/ML vial Inject 24 Units Subcutaneous       insulin glargine (LANTUS VIAL) 100 UNIT/ML vial Inject 47 Units Subcutaneous       insulin lispro (HUMALOG VIAL) 100 UNIT/ML vial Inject 2-10 Units Subcutaneous       lactulose (CHRONULAC) 10 GM/15ML solution Take 60 mLs by mouth       Magnesium Oxide 500 MG TABS Take 500 mg by mouth       melatonin 3 MG tablet Take 3 mg by mouth       mineral oil-hydrophilic petrolatum (AQUAPHOR) external ointment        Multiple Vitamins-Minerals (MULTI VITAMIN  /MINERALS) TABS Take 1 tablet by mouth       naloxone (NARCAN) 4 MG/0.1ML nasal spray Spray 1 spray (4 mg) into one nostril 1 time; may repeat in opposite nostril in 2 minutes if person does not respond.       pantoprazole (PROTONIX) 40 MG EC tablet Take 40 mg by mouth       " senna-docusate (SENOKOT-S/PERICOLACE) 8.6-50 MG tablet Take 2 tablets by mouth       topiramate (TOPAMAX) 25 MG tablet Take 25 mg by mouth       zolpidem (AMBIEN) 5 MG tablet Take 5 mg by mouth       acetaminophen (TYLENOL) 500 MG tablet Take 1,000 mg by mouth (Patient not taking: Reported on 1/10/2022)      )  ( Diabetes Eval:    )    ( Pain Eval:  Severe Pain (7) )    ( Wound Eval:       )    (   History   Smoking Status     Never Smoker   Smokeless Tobacco     Never Used    )    ( Signed By:  Rea Multani, EMT; January 31, 2022; 10:47 AM )

## 2022-01-31 NOTE — LETTER
Date:February 1, 2022      Patient was self referred, no letter generated. Do not send.        [facility] Health Information

## 2022-01-31 NOTE — LETTER
"2022       RE: Max Meade  [address]  [facility] [address]     Dear Colleague,    Thank you for referring your patient, Max Meade, to the [facility] WEIGHT MANAGEMENT CLINIC [place] at [facility]. Please see a copy of my visit note below.    Max is a 41 year old who is being evaluated via a billable video visit.      How would you like to obtain your AVS? MyChart  If the video visit is dropped, the invitation should be resent by: [phone]  Will anyone else be joining your video visit? No  During this virtual visit the patient is located in [place], patient verifies this as the location during the entirety of this visit.     Video-Visit Details    Type of service:  Video Visit    Start: 2022 11:07 am  Stop: 2022 11:29 am    Originating Location (pt. Location): Home    Distant Location (provider location):  [facility] WEIGHT MANAGEMENT CLINIC [place]     Platform used for Video Visit: Naiku          [facility] Medical Weight Management Note     Max Meade  MRN:  [id number]  :  1980  JOSEFINA:  1/10/2022    Dear Colleagues,    I had the pleasure of seeing your patient Max Meade.  He is a 41 year old male who I am continuing to see for treatment of obesity related to:     2021   I have the following health issues associated with obesity: Type II Diabetes, Sleep Apnea   I have the following symptoms associated with obesity: Knee Pain, Depression, Lower Extremity Swelling, Back Pain, Fatigue     CURRENT WEIGHT:   652 lbs 0 oz    Wt Readings from Last 4 Encounters:   22 (!) 295.7 kg (652 lb)   01/10/22 (!) 297.6 kg (656 lb)   21 (!) 302.5 kg (667 lb)     Height:  6' 4\"  Body Mass Index:  Body mass index is 79.36 kg/m .  Vitals:  B/P: Data Unavailable, P: Data Unavailable    Initial consult weight was 667 on down 11.  Weight change since last seen on 1/10/2022 is down " 4 pounds.   Total loss is 15 pounds.    INTERVAL HISTORY:  Says now following 1300 calories diet in hospital and denies any outside food. Now semaglutide, now at 0.5/week.    Diet Recall Review with Patient 12/21/2021   Do you typically eat breakfast? Yes   If you do eat breakfast, what types of food do you eat? Slovak toast,butler, sausage, eggs   Do you typically eat lunch? Yes   If you do eat lunch, what types of food do you typically eat?  Tuna sandwich   Do you typically eat supper? Yes   If you do eat supper, what types of food do you typically eat? Chicken [place]   Do you typically eat snacks? No   Do you like vegetables?  No   Do you drink water? Yes   How many glasses of juice do you drink in a typical day? 0   How many of glasses of milk do you drink in a typical day? 0   If you do drink milk, what type? N/A   How many 8oz glasses of sugar containing drinks such as Simba-Aid/sweet tea do you drink in a day? 0   How many cans/bottles of sugar pop/soda/tea/sports drinks do you drink in a day? 0   How many cans/bottles of diet pop/soda/tea or sports drink do you drink in a day? 7   How often do you have a drink of alcohol? Never     MEDICATIONS:   Current Outpatient Medications   Medication     albuterol (PROVENTIL) (2.5 MG/3ML) 0.083% neb solution     apixaban ANTICOAGULANT (ELIQUIS) 2.5 MG tablet     atorvastatin (LIPITOR) 10 MG tablet     bumetanide (BUMEX) 1 MG tablet     busPIRone (BUSPAR) 10 MG tablet     cholecalciferol 25 MCG (1000 UT) TABS     cyclobenzaprine (FLEXERIL) 5 MG tablet     DULoxetine (CYMBALTA) 60 MG capsule     famotidine (PEPCID) 20 MG tablet     gabapentin (NEURONTIN) 300 MG capsule     hydrOXYzine (VISTARIL) 25 MG capsule     insulin aspart (NOVOLOG VIAL) 100 UNITS/ML vial     insulin glargine (LANTUS VIAL) 100 UNIT/ML vial     insulin lispro (HUMALOG VIAL) 100 UNIT/ML vial     lactulose (CHRONULAC) 10 GM/15ML solution     Magnesium Oxide 500 MG TABS     melatonin 3 MG tablet      mineral oil-hydrophilic petrolatum (AQUAPHOR) external ointment     Multiple Vitamins-Minerals (MULTI VITAMIN  /MINERALS) TABS     naloxone (NARCAN) 4 MG/0.1ML nasal spray     pantoprazole (PROTONIX) 40 MG EC tablet     senna-docusate (SENOKOT-S/PERICOLACE) 8.6-50 MG tablet     topiramate (TOPAMAX) 25 MG tablet     zolpidem (AMBIEN) 5 MG tablet     acetaminophen (TYLENOL) 500 MG tablet     No current facility-administered medications for this visit.     Weight Loss Medication History Reviewed With Patient 1/31/2022   Which weight loss medications are you currently taking on a regular basis?  Topamax (topiramate)   Are you having any side effects from the weight loss medication that we have prescribed you? No     ASSESSMENT:   Maintain 1300 calories diet in hospital and increase semaglutide to 1 mg/week, add topiramate (nursing says not now on it) in taper up. Will again discuss barisurgery with surgery colleagues, but likely will need some weight loss before can consider surgery.    FOLLOW-UP:    12 weeks.    Sincerely,  Erlin Hdz MD      Again, thank you for allowing me to participate in the care of your patient.      Sincerely,    Erlin Hdz MD

## 2022-02-04 ENCOUNTER — TELEPHONE (OUTPATIENT)
Dept: ENDOCRINOLOGY | Facility: CLINIC | Age: 42
End: 2022-02-04
Payer: MEDICARE

## 2022-02-06 ENCOUNTER — HEALTH MAINTENANCE LETTER (OUTPATIENT)
Age: 42
End: 2022-02-06

## 2022-02-22 ENCOUNTER — TELEPHONE (OUTPATIENT)
Dept: SURGERY | Facility: CLINIC | Age: 42
End: 2022-02-22
Payer: MEDICARE

## 2022-02-23 ENCOUNTER — PATIENT OUTREACH (OUTPATIENT)
Dept: CARE COORDINATION | Facility: CLINIC | Age: 42
End: 2022-02-23
Payer: MEDICARE

## 2022-02-23 NOTE — PROGRESS NOTES
"Max Meade is a 41 year old male who is being evaluated via a billable telephone visit. Could not get a stable connection for vise    The patient has been notified of following:     \"This telephone visit will be conducted via a call between you and your physician/provider. We have found that certain health care needs can be provided without the need for a physical exam.  This service lets us provide the care you need with a short phone conversation.  If a prescription is necessary we can send it directly to your pharmacy.  If lab work is needed we can place an order for that and you can then stop by our lab to have the test done at a later time.    Telephone visits are billed at different rates depending on your insurance coverage. During this emergency period, for some insurers they may be billed the same as an in-person visit.  Please reach out to your insurance provider with any questions.    If during the course of the call the physician/provider feels a telephone visit is not appropriate, you will not be charged for this service.\"    Patient has given verbal consent for Telephone visit?  Yes    How would you like to obtain your AVS? MyChart    Phone call duration: 28 minutes    During this virtual visit the patient is located in [place], patient verifies this as the location during the entirety of this visit.       Weight Management Nutrition Consultation    Max Meade is a 41 year old male presents today for return weight management nutrition consultation.  Patient referred by Dr. Hdz on December 27, 2021.    Patient with Co-morbidities of obesity including:  Type II DM yes (insulin dependant, managed inpatient currently)  Renal Failure no  Sleep apnea yes  Hypertension no   Dyslipidemia no  Joint pain yes  Back pain yes  GERD no     Anthropometrics:  Admitted in March. Notes he started working on weight loss at this time. Weight per care everywhere:   321 kg (707 lb 10.8 oz) 03/18/2021      Per " "notes from [facility] Bariatric RD:  12/13/21 (!) 293.8 kg (647 lb 12.8 oz)   12/08/21 (!) 287.9 kg (634 lb 12.8 oz)   09/27/21 (!) 292.2 kg (644 lb 3.2 oz)     Estimated body mass index is 81.19 kg/m  as calculated from the following:    Height as of an earlier encounter on 12/27/21: 1.93 m (6' 4\").    Weight as of an earlier encounter on 12/27/21: 302.5 kg (667 lb).    Weight checks over the past month (per pt report):  1/24: 648 lbs  Somewhere between: 659 lbs  2/14: 648 lbs   2/23: 642 lbs     Medications for Weight Loss:  Ozempic, may be switching to Wegovy   Topiramate    NUTRITION HISTORY  See MD note for details.  Per questionnaire, strong emotional component to eating. Readiness and confidence to change, rated 10 (very ready).    Saw [facility] bariatric RD 6 times, last visit 12/16/21. Per last note - Preparing for weight loss surgery. Needs to lose to 540 lbs for surgery (-70 lbs from initial consult). Pt states his goal is to achieve this in next 3 months.    Currently admitted (since March) at [facility] in [facility] and working with inpatient RD on weight loss. Had been encouraged to follow 2300 calories/day for last 3-4 months, and had not seen any wt loss, and has actually gained (~20 lbs). Reports may be related to fluid retention.    He is getting 3 meals and 1 snack per day from the hospital. Reports no outside foods. He does not like hospital cooked veggies or greek yogurt.  Once per week slice of cake and once per day cookies.  Does not tolerate regular milk.     Jan 2022 - Pt reports he has lost 19 lbs since initial visit. He was following 1500 calories/day until 1/11, then started following 1350 calories/day. Repots no outside food from hospital meals and snacks. Rolling and moving better. Is able to stand for a minute which he was not doing before. Doing arm movements while in bed. He is interested in weight loss surgery consult with [facility].    Today -  Pt reports he is " doing PT 4 days per week. States he continues to follow the 1350 calories per day. States he has been talking to his cousin more on the phone which is helping distract between meals/snacks. His cousin has been very supportive to him and he wants to be able to move to [place] where his cousin lives. States he is able to stick to the diet plan at the hospital without issues, denies consuming outside food.     1350 calories/day:  Breakfast: protein shake + english muffin + 1 pc sausage  12: fresh fruit bowl (grapes, pineapple, strawberries and melon)  Lunch 2pm: protein shake with grapes  4pm: cucumbers and carrots   Dinner: 6 pm  1/2 turkey sandwich, 1 banana, cucumbers, carrots, coke zero and protein shake (fridays cake with dinner)  11 pm: cucumbers/carrots with string cheese  Beverages: water/crystallite (5-6 x 8 oz/day)    Progress Towards Previous Goals:  1) Follow 1350 calorie/day plan - He reports continuing to follow the hospital meal plan without issue.    - Continue to replace 2 meals per day with low-calorie protein shake.  2) Aim for 20-30 grams protein per meal/snack - Met, continues  3) Continue to use only calorie-free beverages. Consume 64+ oz fluid/day. - Met, continues  4) Eat slowly (20-30 minutes per meal), chewing foods well (25 chews per bite/applesauce consistency)  - Met, continues    Physical Activity:  Reports doing PT/OT and exercise 4 times per week for 30 mins and doing seated exercises while in bed.     Nutrition Prescription  Recommended energy/nutrient modification.  1500 calories/day (per MD)    Nutrition Diagnosis  Obesity r/t long history of positive energy balance aeb BMI >30.     Nutrition Intervention  Materials/education provided:  - Reviewed progress towards previous goals  - Reviewed strategy for weight loss - continuing meal plan as established. Encouraged pt to continue working on his mobility with PT.    - Praised pt on weight loss this past month and consistency with hypocaloric  diet.   Patient demonstrates understanding.    Expected Engagement: good  Follow-Up Plans: calorie goals     Nutrition Goals  1) Follow 1350 calorie/day plan   - Continue to replace 2 meals per day with low-calorie protein shake.  2) Aim for 20-30 grams protein per meal/snack  3) Continue to use only calorie-free beverages. Consume 64+ oz fluid/day.   4) Use distractions between meals/snacks - calling your cousin, social media, journaling.   5) Keep up with PT instructions     Meal Replacement Shake Options:   *Protein Shake Criteria: no more than 210 Calories, at least 20 grams of protein, and less than 10 grams of sugar   [facility] smoothie (160 Calories, 20 g protein)   Premier Protein (160 Calories, 30 g protein)  Slim Fast Advanced Nutrition (180 Calories, 20 g protein)  Muscle Milk, lactose-free, 17 oz bottle (210 Calories, 30 g protein)  Integrated Supplements, no artificial sugars (110 Calories, 20 g protein)  Genepro, unflavored protein powder (60 Calories, 30 g protein)  Boost/Ensure Max (160 calories, 30 gm protein)   [employer] Core Power (170 calories, 26 gm protein)    Meal Replacement Bar Options:  [facility] Protein Shake (160 Calories, 15 g protein)  Quest Protein Bars (190 Calories, 20 g protein)  Built Bar (170 Calories, 15-20 g protein)  One Protein Bar (210 calories, 20 g protein)  [place] Signature Protein Bar ([employer]) (190 Calories, 21 g protein)  Pure Protein Bars (180 Calories, 21 g protein)      Protein Sources for Weight Loss  [email or web address]     Carbohydrates  [email or web address]     Mindful Eating  [email or web address]     Diet Guidelines after Weight Loss Surgery  [email or web address]     Seated Exercises for Arms and Legs (can be done before or after surgery)  [email or web address]      Follow-Up:  1 month, PRN    Time spent with patient: 28 minutes.  Blossom Caal, JOSEPHINE, LD

## 2022-02-24 ENCOUNTER — VIRTUAL VISIT (OUTPATIENT)
Dept: ENDOCRINOLOGY | Facility: CLINIC | Age: 42
End: 2022-02-24
Payer: MEDICARE

## 2022-02-24 DIAGNOSIS — E66.9 OBESITY: ICD-10-CM

## 2022-02-24 DIAGNOSIS — Z71.3 NUTRITIONAL COUNSELING: Primary | ICD-10-CM

## 2022-02-24 PROCEDURE — 97803 MED NUTRITION INDIV SUBSEQ: CPT | Mod: 95 | Performed by: DIETITIAN, REGISTERED

## 2022-02-24 NOTE — PROGRESS NOTES
Social Work Intervention  [facility] [place]    Data/Intervention:    Patient Name:  Max Meade  /Age:  1980 (41 year old)    Visit Type: telephone  Referral Source: Sherice Schmid  Reason for Referral:  Placement guidance- consultation with Pt's care team    Collaborated With:    -Maria E Jamil NP and Chen Riojas SW([phone])  from [facility] in [place], [place]     Psychosocial Information/Concerns:  Pt has been at the [facility] since 2021. His goal is to have weight loss surgery but has to lose significant weight before surgery. He has lost only 10lbs since he has been there. He is now refusing to engage in PT/OT and they need to move him to another facility. They haven't been able to find any facility that can manage his care related to his weight.     Intervention/Education/Resources Provided:  -Encouraged having psychiatry at the table for care conferences.  -Encouraged engagement of his family in his success on his goals  -Provided names of some facilities in the [facility] that have provided care to bariatric Pts 600+lbs  -Encouraged engaging a [facility] relocation worker in getting him on a waiver program and help in finding residential care options  -Encouraged day treatment if possible by video visit with the [facility]  -Encouraged discussion with psychiatry re whether a petition for commitment would be useful or possible due to the life threatening nature of his weight and inactivity.     Assessment/Plan:  No further f/u planned at this time.     HEAVENLY Chery, [facility]    [facility] [place]  [phone]

## 2022-02-24 NOTE — PATIENT INSTRUCTIONS
Joe Santana,    Follow-up with RD in 1 month.     Thank you,    Blossom Caal, RD, LD  If you would like to schedule or reschedule an appointment with the RD, please call [phone]    Nutrition Goals  1) Follow 1350 calorie/day plan   - Continue to replace 2 meals per day with low-calorie protein shake.  2) Aim for 20-30 grams protein per meal/snack  3) Continue to use only calorie-free beverages. Consume 64+ oz fluid/day.   4) Use distractions between meals/snacks - calling your cousin, social media, journaling.   5) Keep up with PT instructions     Meal Replacement Shake Options:   *Protein Shake Criteria: no more than 210 Calories, at least 20 grams of protein, and less than 10 grams of sugar   [facility] smoothie (160 Calories, 20 g protein)   Premier Protein (160 Calories, 30 g protein)  Slim Fast Advanced Nutrition (180 Calories, 20 g protein)  Muscle Milk, lactose-free, 17 oz bottle (210 Calories, 30 g protein)  Integrated Supplements, no artificial sugars (110 Calories, 20 g protein)  Genepro, unflavored protein powder (60 Calories, 30 g protein)  Boost/Ensure Max (160 calories, 30 gm protein)   [employer]-go Core Power (170 calories, 26 gm protein)    Meal Replacement Bar Options:   [facility] [place] [facility] Protein Shake (160 Calories, 15 g protein)  Quest Protein Bars (190 Calories, 20 g protein)  Built Bar (170 Calories, 15-20 g protein)  One Protein Bar (210 calories, 20 g protein)  [place] Signature Protein Bar ([employer]) (190 Calories, 21 g protein)  Pure Protein Bars (180 Calories, 21 g protein)      Protein Sources for Weight Loss  [email or web address]     Carbohydrates  [email or web address]     Mindful Eating  [email or web address]     Diet Guidelines after Weight Loss Surgery  [email or web address]     Seated Exercises for Arms and Legs (can be done before or after surgery)  [email or web address]          Interested in working with a health ?  Health coaches work  with you to improve your overall health and wellbeing.  They look at the whole person, and may involve discussion of different areas of life, including, but not limited to the four pillars of health (sleep, exercise, nutrition, and stress management). Discuss with your care team if you would like to start working a health .    Health Coaching-3 Pack:    $99 for three health coaching visits    Visits may be done in person or via phone    Coaching is a partnership between the  and the client; Coaches do not prescribe or diagnose    Coaching helps inspire the client to reach his/her personal goals      COMPREHENSIVE WEIGHT MANAGEMENT PROGRAM  VIRTUAL SUPPORT GROUPS    For Support Group Information:      We offer support groups for patients who are working on weight loss and considering, preparing for or have had weight loss surgery.   There is no cost for this opportunity.  You are invited to attend the?Virtual Support Groups?provided by any of the following locations:    1. [facility] via Microsoft Teams with Sarah Ceja RN  2.   [place] via [employer] with Obed Davenport, PhD, LP  3.   [place] via [employer] with Gabi Winchester RN  4.   [facility] via Microsoft Teams with Gabi Walters [facility]    The following Support Group information can also be found on our website:  [email or web address]      [facility] Weight Loss Surgery Support Group    [facility] Weight Loss Surgery Support Group  The support group is a patient-lead forum that meets monthly to share experiences, encouragement and education. It is open to those who have had weight loss surgery, are scheduled for surgery, and those who are considering surgery.   WHEN: This group meets on the 3rd Wednesday of each month from 5:00PM - 6:00PM virtually using Microsoft Teams.   FACILITATOR: Led by Sarah Ceja, RD, LD, RN, the program's Clinical  "Coordinator.   TO REGISTER: Please contact the clinic via [employer] or call the nurse line directly at [phone] to inform our staff that you would like an invite sent to you and to let us know the email you would like the invite sent to. Prior to the meeting, a link with directions on how to join the meeting will be sent to you.    2022 Meetings  January 19: \"Let's Talk\" a time for the group to share.  February 16: \"Let's Talk\" a time for the group to share.  March 16: Guest Speakers: Psychologists, Rosina Polanco, PhD,LP and Katrin Marie PsyD,LP  April 20: Guest Speaker: Health , Jacquelin Churchill, [facility],CHES, [facility]  May 18: Guest Speaker: DietitianZay, RD, LP  Geraldine 15: \"Let's Talk\" a time for the group to share.  July 20: \"Let's Talk\" a time for the group to share.  August 17: TBA  September 21: TBA  October 19: Guest Speaker: Dr Geovany Amezcua MD Pulmonologist and Sleep Medicine Physician, \"Getting a Good Night's Sleep\".  November 16: TBA  December 21: TBA    [facility] Support Groups    Connections: Bariatric Care Support Group?  This is open to all [facility] (and those external to this program) pre- and post- operative bariatric surgery patients as well as their support system.   WHEN: This group meets the 2nd Tuesday of each month from 6:30 PM - 8:00 PM virtually using Microsoft Teams.   FACILITATOR: Led by Obed Davenport, Ph.D who is a Licensed Psychologist with the [facility] Comprehensive Weight Management Program.   TO REGISTER: Please send an email to Obed Davenport, Ph.D., LP at?[email or web address]?if you would like an invitation to the group and to learn about using Microsoft Teams.    2022 Meetings  January 11: Rupa Melgar, PharmD, Pharmacy Resident at [facility], \"Medications and Bariatric Surgery\".  February 8: Open Forum  March 8  April 12  May 10  Geraldine 14    Connections: Post-Operative Bariatric Surgery Support " "Group  This is a support group for [facility] bariatric patients (and those external to [facility]) who have had bariatric surgery and are at least 3 months post-surgery.  WHEN: This support group meets the 4th Wednesday of the month from 11:00 AM - 12:00 PM virtually using Microsoft Teams.   FACILITATOR: Led by Certified Bariatric Nurse, Gabi Winchester RN.   TO REGISTER: Please send an email to Gabi at [email or web address] if you would like an invitation to the group and to learn about using Microsoft Teams.    2022 Meetings  January 26  February 23  March 23  April 27  May 25  Geraldine 22    [facility] Healthy Lifestyle Virtual Support Group    Healthy Lifestyle Virtual Support Group?  This is 60 minutes of small group guided discussion, support and resources. All are welcome who want a healthy lifestyle.  WHEN: This group meets monthly on a Friday from 12:30 PM - 1:30 PM virtually using Microsoft Teams.   FACILITATOR: Led by National Board Certified Health and , Gabi Walters [facility].   TO REGISTER: Please send an email to Gabi [email or web address] to receive monthly invites to the group or if you have any questions about having a health .  Prior to the meeting, a link with directions on how to join the meeting will be sent to you.    2022 Meetings  January 21: Laura Centeno MS, RN, CIC, CBN, \"Healthy Habits\"  February 25: Open Forum  March 18: \"Setting Limits and Boundaries\"  April 29: Ale Hu RD, \"Meal Planning Made Easy\"  May 20: Open Forum  June: To be determined          "

## 2022-02-24 NOTE — LETTER
"2/24/2022       RE: aMx Meade  [address] [facility] [address]  [facility] [address]     Dear Colleague,    Thank you for referring your patient, Max Meade, to the [facility] [place] [facility]. Please see a copy of my visit note below.    Max Meade is a 41 year old male who is being evaluated via a billable telephone visit. Could not get a stable connection for vise    The patient has been notified of following:     \"This telephone visit will be conducted via a call between you and your physician/provider. We have found that certain health care needs can be provided without the need for a physical exam.  This service lets us provide the care you need with a short phone conversation.  If a prescription is necessary we can send it directly to your pharmacy.  If lab work is needed we can place an order for that and you can then stop by our lab to have the test done at a later time.    Telephone visits are billed at different rates depending on your insurance coverage. During this emergency period, for some insurers they may be billed the same as an in-person visit.  Please reach out to your insurance provider with any questions.    If during the course of the call the physician/provider feels a telephone visit is not appropriate, you will not be charged for this service.\"    Patient has given verbal consent for Telephone visit?  Yes    How would you like to obtain your AVS? Airamhart    Phone call duration: 28 minutes    During this virtual visit the patient is located in [place], patient verifies this as the location during the entirety of this visit.       Weight Management Nutrition Consultation    Max Meade is a 41 year old male presents today for return weight management nutrition consultation.  Patient referred by Dr. Hdz on December 27, 2021.    Patient with Co-morbidities of obesity " "including:  Type II DM yes (insulin dependant, managed inpatient currently)  Renal Failure no  Sleep apnea yes  Hypertension no   Dyslipidemia no  Joint pain yes  Back pain yes  GERD no     Anthropometrics:  Admitted in March. Notes he started working on weight loss at this time. Weight per care everywhere:   321 kg (707 lb 10.8 oz) 03/18/2021      Per notes from [facility] Bariat[facility] RD:  12/13/21 (!) 293.8 kg (647 lb 12.8 oz)   12/08/21 (!) 287.9 kg (634 lb 12.8 oz)   09/27/21 (!) 292.2 kg (644 lb 3.2 oz)     Estimated body mass index is 81.19 kg/m  as calculated from the following:    Height as of an earlier encounter on 12/27/21: 1.93 m (6' 4\").    Weight as of an earlier encounter on 12/27/21: 302.5 kg (667 lb).    Weight checks over the past month (per pt report):  1/24: 648 lbs  Somewhere between: 659 lbs  2/14: 648 lbs   2/23: 642 lbs     Medications for Weight Loss:  Ozempic, may be switching to Wegovy   Topiramate    NUTRITION HISTORY  See MD note for details.  Per questionnaire, strong emotional component to eating. Readiness and confidence to change, rated 10 (very ready).    Saw [facility] bariatric RD 6 times, last visit 12/16/21. Per last note - Preparing for weight loss surgery. Needs to lose to 540 lbs for surgery (-70 lbs from initial consult). Pt states his goal is to achieve this in next 3 months.    Currently admitted (since March) at [facility] in [facility] and working with inpatient RD on weight loss. Had been encouraged to follow 2300 calories/day for last 3-4 months, and had not seen any wt loss, and has actually gained (~20 lbs). Reports may be related to fluid retention.    He is getting 3 meals and 1 snack per day from the hospital. Reports no outside foods. He does not like hospital cooked veggies or greek yogurt.  Once per week slice of cake and once per day cookies.  Does not tolerate regular milk.     Jan 2022 - Pt reports he has lost 19 lbs since initial visit. He " was following 1500 calories/day until 1/11, then started following 1350 calories/day. Repots no outside food from hospital meals and snacks. Rolling and moving better. Is able to stand for a minute which he was not doing before. Doing arm movements while in bed. He is interested in weight loss surgery consult with [facility].    Today -  Pt reports he is doing PT 4 days per week. States he continues to follow the 1350 calories per day. States he has been talking to his cousin more on the phone which is helping distract between meals/snacks. His cousin has been very supportive to him and he wants to be able to move to [place] where his cousin lives. States he is able to stick to the diet plan at the hospital without issues, denies consuming outside food.     1350 calories/day:  Breakfast: protein shake + english muffin + 1 pc sausage  12: fresh fruit bowl (grapes, pineapple, strawberries and melon)  Lunch 2pm: protein shake with grapes  4pm: cucumbers and carrots   Dinner: 6 pm  1/2 turkey sandwich, 1 banana, cucumbers, carrots, coke zero and protein shake (fridays cake with dinner)  11 pm: cucumbers/carrots with string cheese  Beverages: water/crystallite (5-6 x 8 oz/day)    Progress Towards Previous Goals:  1) Follow 1350 calorie/day plan - He reports continuing to follow the hospital meal plan without issue.    - Continue to replace 2 meals per day with low-calorie protein shake.  2) Aim for 20-30 grams protein per meal/snack - Met, continues  3) Continue to use only calorie-free beverages. Consume 64+ oz fluid/day. - Met, continues  4) Eat slowly (20-30 minutes per meal), chewing foods well (25 chews per bite/applesauce consistency)  - Met, continues    Physical Activity:  Reports doing PT/OT and exercise 4 times per week for 30 mins and doing seated exercises while in bed.     Nutrition Prescription  Recommended energy/nutrient modification.  1500 calories/day (per MD)    Nutrition Diagnosis  Obesity r/t long history  of positive energy balance aeb BMI >30.     Nutrition Intervention  Materials/education provided:  - Reviewed progress towards previous goals  - Reviewed strategy for weight loss - continuing meal plan as established. Encouraged pt to continue working on his mobility with PT.    - Praised pt on weight loss this past month and consistency with hypocaloric diet.   Patient demonstrates understanding.    Expected Engagement: good  Follow-Up Plans: calorie goals     Nutrition Goals  1) Follow 1350 calorie/day plan   - Continue to replace 2 meals per day with low-calorie protein shake.  2) Aim for 20-30 grams protein per meal/snack  3) Continue to use only calorie-free beverages. Consume 64+ oz fluid/day.   4) Use distractions between meals/snacks - calling your cousin, social media, journaling.   5) Keep up with PT instructions     Meal Replacement Shake Options:   *Protein Shake Criteria: no more than 210 Calories, at least 20 grams of protein, and less than 10 grams of sugar   [facility] smoothie (160 Calories, 20 g protein)   Premier Protein (160 Calories, 30 g protein)  Slim Fast Advanced Nutrition (180 Calories, 20 g protein)  Muscle Milk, lactose-free, 17 oz bottle (210 Calories, 30 g protein)  Integrated Supplements, no artificial sugars (110 Calories, 20 g protein)  Genepro, unflavored protein powder (60 Calories, 30 g protein)  Boost/Ensure Max (160 calories, 30 gm protein)   Similar Pages Core Power (170 calories, 26 gm protein)    Meal Replacement Bar Options:  [facility] Protein Shake (160 Calories, 15 g protein)  Quest Protein Bars (190 Calories, 20 g protein)  Built Bar (170 Calories, 15-20 g protein)  One Protein Bar (210 calories, 20 g protein)  [place] Signature Protein Bar ([employer]co) (190 Calories, 21 g protein)  Pure Protein Bars (180 Calories, 21 g protein)      Protein Sources for Weight Loss  [email or web address]     Carbohydrates  [email or web address]     Mindful  Eating  [email or web address]     Diet Guidelines after Weight Loss Surgery  [email or web address]     Seated Exercises for Arms and Legs (can be done before or after surgery)  [email or web address]      Follow-Up:  1 month, PRN    Time spent with patient: 28 minutes.  Blossom Caal RD, LD        Again, thank you for allowing me to participate in the care of your patient.      Sincerely,    Blossom Caal RD

## 2022-02-24 NOTE — LETTER
Date:February 25, 2022      Patient was self referred, no letter generated. Do not send.        [facility] Health Information

## 2022-03-25 ENCOUNTER — VIRTUAL VISIT (OUTPATIENT)
Dept: ENDOCRINOLOGY | Facility: CLINIC | Age: 42
End: 2022-03-25
Payer: MEDICARE

## 2022-03-25 DIAGNOSIS — E11.9 DIABETES MELLITUS (H): ICD-10-CM

## 2022-03-25 DIAGNOSIS — Z71.3 NUTRITIONAL COUNSELING: Primary | ICD-10-CM

## 2022-03-25 DIAGNOSIS — E66.9 OBESITY: ICD-10-CM

## 2022-03-25 PROCEDURE — 97803 MED NUTRITION INDIV SUBSEQ: CPT | Mod: GA | Performed by: DIETITIAN, REGISTERED

## 2022-03-25 NOTE — PATIENT INSTRUCTIONS
Joe Santana,    Follow-up with RD in 2 months.     Thank you,    Blossom Caal, RD, LD  If you would like to schedule or reschedule an appointment with the RD, please call [phone]    Nutrition Goals  1) Follow 1350 calorie/day plan   - Continue to replace 2 meals per day with low-calorie protein shake.  2) Aim for 20-30 grams protein per meal/snack  3) Continue to use only calorie-free beverages. Consume 64+ oz fluid/day.   4) Use distractions between meals/snacks - calling your cousin, social media, journaling.   5) Work to maintain consistency with exercise sessions.     Meal Replacement Shake Options:   *Protein Shake Criteria: no more than 210 Calories, at least 20 grams of protein, and less than 10 grams of sugar   [facility] smoothie (160 Calories, 20 g protein)   Premier Protein (160 Calories, 30 g protein)  Slim Fast Advanced Nutrition (180 Calories, 20 g protein)  Muscle Milk, lactose-free, 17 oz bottle (210 Calories, 30 g protein)  Integrated Supplements, no artificial sugars (110 Calories, 20 g protein)  Genepro, unflavored protein powder (60 Calories, 30 g protein)  Boost/Ensure Max (160 calories, 30 gm protein)   Fairlife Core Power (170 calories, 26 gm protein)    Meal Replacement Bar Options:   [facility] Protein Shake (160 Calories, 15 g protein)  Quest Protein Bars (190 Calories, 20 g protein)  Built Bar (170 Calories, 15-20 g protein)  One Protein Bar (210 calories, 20 g protein)  [place] Signature Protein Bar ([employer]) (190 Calories, 21 g protein)  Pure Protein Bars (180 Calories, 21 g protein)      Protein Sources for Weight Loss  [email or web address]     Carbohydrates  [email or web address]     Mindful Eating  [email or web address]     Diet Guidelines after Weight Loss Surgery  [email or web address]     Seated Exercises for Arms and Legs (can be done before or after surgery)  [email or web address]      Interested in working with a health ?   Health coaches work with you to improve your overall health and wellbeing.  They look at the whole person, and may involve discussion of different areas of life, including, but not limited to the four pillars of health (sleep, exercise, nutrition, and stress management). Discuss with your care team if you would like to start working a health .    Health Coaching-3 Pack:    $99 for three health coaching visits    Visits may be done in person or via phone    Coaching is a partnership between the  and the client; Coaches do not prescribe or diagnose    Coaching helps inspire the client to reach his/her personal goals      COMPREHENSIVE WEIGHT MANAGEMENT PROGRAM  VIRTUAL SUPPORT GROUPS    For Support Group Information:      We offer support groups for patients who are working on weight loss and considering, preparing for or have had weight loss surgery.   There is no cost for this opportunity.  You are invited to attend the?Virtual Support Groups?provided by any of the following locations:    1. [facility] via Microsoft Teams with Sarah Ceja RN  2.   [place] via [employer] with Obed Davenport, PhD, LP  3.   [place] via [employer] with Gabi Winchester RN  4.   [facility] via Microsoft Teams with Gabi Walters Count includes the [facility]    The following Support Group information can also be found on our website:  [email or web address]      [facility] Weight Loss Surgery Support Group    [facility] Weight Loss Surgery Support Group  The support group is a patient-lead forum that meets monthly to share experiences, encouragement and education. It is open to those who have had weight loss surgery, are scheduled for surgery, and those who are considering surgery.   WHEN: This group meets on the 3rd Wednesday of each month from 5:00PM - 6:00PM virtually using Microsoft Teams.   FACILITATOR: Led by Sarah Ceja RD, LD, RN, the  "program's Clinical Coordinator.   TO REGISTER: Please contact the clinic via [employer] or call the nurse line directly at [phone] to inform our staff that you would like an invite sent to you and to let us know the email you would like the invite sent to. Prior to the meeting, a link with directions on how to join the meeting will be sent to you.    2022 Meetings  January 19: \"Let's Talk\" a time for the group to share.  February 16: \"Let's Talk\" a time for the group to share.  March 16: Guest Speakers: Psychologists, Rosina Polanco, PhD,LP and Katrin Marie PsyD,  April 20: Guest Speaker: Health , Jacquelin Churchill, [facility], [facility]  May 18: Guest Speaker: Dietitian, Zay Gabriel, JOSEPHINE, LP  Geraldine 15: \"Let's Talk\" a time for the group to share.  July 20: \"Let's Talk\" a time for the group to share.  August 17: TBA  September 21: TBA  October 19: Guest Speaker: Dr Geovany Amezcua MD Pulmonologist and Sleep Medicine Physician, \"Getting a Good Night's Sleep\".  November 16: TBA  December 21: TBA    [facility] Support Groups    Connections: Bariatric Care Support Group?  This is open to all [facility] (and those external to this program) pre- and post- operative bariatric surgery patients as well as their support system.   WHEN: This group meets the 2nd Tuesday of each month from 6:30 PM - 8:00 PM virtually using Microsoft Teams.   FACILITATOR: Led by Obed Davenport, Ph.D who is a Licensed Psychologist with the [facility] Comprehensive Weight Management Program.   TO REGISTER: Please send an email to Obed Davenport, Ph.D., LP at?[email or web address]?if you would like an invitation to the group and to learn about using Microsoft Teams.    2022 Meetings  January 11: Rupa Melgar, PharmD, Pharmacy Resident at [facility], \"Medications and Bariatric Surgery\".  February 8: Open Forum  March 8  April 12  May 10  Geraldine 14    Connections: Post-Operative Bariatric Surgery " "Support Group  This is a support group for [facility] bariatric patients (and those external to [facility]) who have had bariatric surgery and are at least 3 months post-surgery.  WHEN: This support group meets the 4th Wednesday of the month from 11:00 AM - 12:00 PM virtually using Microsoft Teams.   FACILITATOR: Led by Certified Bariatric Nurse, Gabi Winchester RN.   TO REGISTER: Please send an email to Gabi at [email or web address] if you would like an invitation to the group and to learn about using Microsoft Teams.    2022 Meetings  January 26  February 23  March 23  April 27  May 25  Geraldine 22    [facility] Healthy Lifestyle Virtual Support Group    Healthy Lifestyle Virtual Support Group?  This is 60 minutes of small group guided discussion, support and resources. All are welcome who want a healthy lifestyle.  WHEN: This group meets monthly on a Friday from 12:30 PM - 1:30 PM virtually using Microsoft Teams.   FACILITATOR: Led by National Board Certified Health and , Gabi Walters [facility].   TO REGISTER: Please send an email to Gabi [email or web address] to receive monthly invites to the group or if you have any questions about having a health .  Prior to the meeting, a link with directions on how to join the meeting will be sent to you.    2022 Meetings  January 21: Laura Centeno MS, RN, CIC, CBN, \"Healthy Habits\"  February 25: Open Forum  March 18: \"Setting Limits and Boundaries\"  April 29: Ale Hu RD, \"Meal Planning Made Easy\"  May 20: Open Forum  June: To be determined          "

## 2022-03-25 NOTE — LETTER
Date:March 26, 2022      Patient was self referred, no letter generated. Do not send.        [facility] Health Information

## 2022-03-25 NOTE — PROGRESS NOTES
"Max Meade is a 41 year old male who is being evaluated via a billable video visit.      The patient has been notified of following:     \"This video visit will be conducted via a call between you and your physician/provider. We have found that certain health care needs can be provided without the need for an in-person physical exam.  This service lets us provide the care you need with a video conversation.  If a prescription is necessary we can send it directly to your pharmacy.  If lab work is needed we can place an order for that and you can then stop by our lab to have the test done at a later time.    Video visits are billed at different rates depending on your insurance coverage.  Please reach out to your insurance provider with any questions.    If during the course of the call the physician/provider feels a video visit is not appropriate, you will not be charged for this service.\"    Patient has given verbal consent for Video visit? Yes  How would you like to obtain your AVS? MyChart  Will anyone else be joining your video visit? No  {If patient encounters technical issues they should call [phone]      Video-Visit Details    Type of service:  Video Visit    Video Start Time: 10:30 AM  Video End Time: 10:58 AM    Originating Location (pt. Location): Home    Distant Location (provider location):  [facility] WEIGHT MANAGEMENT [facility]     Platform used for Video Visit: JumpIn    During this virtual visit the patient is located in [place], patient verifies this as the location during the entirety of this visit.     RD explained ABN verbally to patient over virtual visit. Pt verbally selected option 1 on the ABN form which is good for 1 year. JOSEPHINE sent message to P [id number] to mail out ABN per protocol. Pt instructed to send ABN back to clinic for scanning and filing.      Weight Management Nutrition Consultation    Max Meade is a 41 year old male presents today for " "return weight management nutrition consultation.  Patient referred by Dr. Hdz on December 27, 2021.    Patient with Co-morbidities of obesity including:  Type II DM yes (insulin dependant, managed inpatient currently)  Renal Failure no  Sleep apnea yes  Hypertension no   Dyslipidemia no  Joint pain yes  Back pain yes  GERD no     Anthropometrics:  He reports checking weight weekly.    Current weight (pt report): 631 lbs (-17 lbs this month)    Weight checks over the past month (per pt report):  2/14: 648 lbs   2/23: 642 lbs   1/24: 648 lbs    Per notes from [facility] Bariat[facility] RD:  12/13/21 (!) 293.8 kg (647 lb 12.8 oz)   12/08/21 (!) 287.9 kg (634 lb 12.8 oz)   09/27/21 (!) 292.2 kg (644 lb 3.2 oz)     Admitted in March 2021. Notes he started working on weight loss at this time. Weight per care everywhere:   321 kg (707 lb 10.8 oz) 03/18/2021          Estimated body mass index is 81.19 kg/m  as calculated from the following:    Height as of an earlier encounter on 12/27/21: 1.93 m (6' 4\").    Weight as of an earlier encounter on 12/27/21: 302.5 kg (667 lb).      Medications for Weight Loss:  Ozempic, may be switching to Wegovy   Topiramate    NUTRITION HISTORY  See MD note for details.  Per questionnaire, strong emotional component to eating. Readiness and confidence to change, rated 10 (very ready).    Saw [facility] bariatric RD 6 times, last visit 12/16/21. Per last note - Preparing for weight loss surgery. Needs to lose to 540 lbs for surgery (-70 lbs from initial consult). Pt states his goal is to achieve this in next 3 months.    Currently admitted (since March) at [facility] in [facility] and working with inpatient RD on weight loss. Had been encouraged to follow 2300 calories/day for last 3-4 months, and had not seen any wt loss, and has actually gained (~20 lbs). Reports may be related to fluid retention.    He is getting 3 meals and 1 snack per day from the hospital. Reports no " outside foods. He does not like hospital cooked veggies or greek yogurt.  Once per week slice of cake and once per day cookies.  Does not tolerate regular milk.     Jan 2022 - Pt reports he has lost 19 lbs since initial visit. He was following 1500 calories/day until 1/11, then started following 1350 calories/day. Repots no outside food from hospital meals and snacks. Rolling and moving better. Is able to stand for a minute which he was not doing before. Doing arm movements while in bed. He is interested in weight loss surgery consult with [facility].    Feb 2022 -  Pt reports he is doing PT 4 days per week. States he continues to follow the 1350 calories per day. States he has been talking to his cousin more on the phone which is helping distract between meals/snacks. His cousin has been very supportive to him and he wants to be able to move to [place] where his cousin lives. States he is able to stick to the diet plan at the hospital without issues, denies consuming outside food.     Today - He reports this month has been going well. States PT has been on hold, wants him to lose more weight first. Exercise session 30 mins 4 times per week doing seated exercise bike, medicine ball lifting. Has a couch in room where they are doing exercises.  Has a chart in his room to cross off lbs as he is losing, encouraged by this. Has an image of an outfit he wants to fit one day that helps with motivation. States he continues to follow hospital meal plan. Sometimes feeling hungry between dinner and HS snack, but is able to stay distracted - calling family, Youtube videos. Watches baking videos, but states this does not trigger him to want to eat.     1350 calories/day:  Breakfast: protein shake + english muffin + 1 pc sausage  12: fresh fruit bowl (grapes, pineapple, strawberries and melon)  Lunch 2pm: protein shake with grapes  4pm: cucumbers and carrots   Dinner: 6 pm  1/2 turkey sandwich, 1/2 banana, cucumbers, carrots, coke zero  and protein shake (fridays cake with dinner)  10 pm: cucumbers/carrots with string cheese - taking with medication   Beverages: water/crystallite (5-6 x 8 oz/day)    Progress Towards Previous Goals:  1) Follow 1350 calorie/day plan - Met, continues   - Continue to replace 2 meals per day with low-calorie protein shake.  2) Aim for 20-30 grams protein per meal/snack - Met, continues  3) Continue to use only calorie-free beverages. Consume 64+ oz fluid/day. - Met, continues  4) Use distractions between meals/snacks - calling your cousin, social media, journaling. - Met, continues  5) Keep up with PT instructions - Not met, stopped until losing more weight    Physical Activity:  Reports doing PT/OT and exercise 4 times per week for 30 mins and doing seated exercises while in bed.     Nutrition Prescription  Recommended energy/nutrient modification.  1500 calories/day (per MD)    Nutrition Diagnosis  Obesity r/t long history of positive energy balance aeb BMI >30. - improving/continues    Nutrition Intervention  Materials/education provided:  - Reviewed progress towards previous goals  - Reviewed strategy for weight loss - continuing meal plan as established. Encouraged consistency with exercise sessions. Discussed motivations for weight loss. Encouraged visual representations of motivations for frequent reminder.     - Praised pt on weight loss this past month and consistency with hypocaloric diet.   Patient demonstrates understanding.    Expected Engagement: good  Follow-Up Plans: calorie goals     Nutrition Goals  1) Follow 1350 calorie/day plan   - Continue to replace 2 meals per day with low-calorie protein shake.  2) Aim for 20-30 grams protein per meal/snack  3) Continue to use only calorie-free beverages. Consume 64+ oz fluid/day.   4) Use distractions between meals/snacks - calling your cousin, social media, journaling.   5) Work to maintain consistency with exercise sessions.     Meal Replacement Shake  Options:   *Protein Shake Criteria: no more than 210 Calories, at least 20 grams of protein, and less than 10 grams of sugar   [facility] smoothie (160 Calories, 20 g protein)   Premier Protein (160 Calories, 30 g protein)  Slim Fast Advanced Nutrition (180 Calories, 20 g protein)  Muscle Milk, lactose-free, 17 oz bottle (210 Calories, 30 g protein)  Integrated Supplements, no artificial sugars (110 Calories, 20 g protein)  Genepro, unflavored protein powder (60 Calories, 30 g protein)  Boost/Ensure Max (160 calories, 30 gm protein)   [employer] Core Power (170 calories, 26 gm protein)    Meal Replacement Bar Options:  [facility] Protein Shake (160 Calories, 15 g protein)  Quest Protein Bars (190 Calories, 20 g protein)  Built Bar (170 Calories, 15-20 g protein)  One Protein Bar (210 calories, 20 g protein)  [place] Signature Protein Bar ([employer]) (190 Calories, 21 g protein)  Pure Protein Bars (180 Calories, 21 g protein)      Protein Sources for Weight Loss  [email or web address]     Carbohydrates  [email or web address]     Mindful Eating  [email or web address]     Diet Guidelines after Weight Loss Surgery  [email or web address]     Seated Exercises for Arms and Legs (can be done before or after surgery)  [email or web address]      Follow-Up:  1-2 month, PRN    Time spent with patient: 28 minutes.  Blossom Caal RD, LD

## 2022-03-25 NOTE — LETTER
"3/25/2022       RE: Max Meade  [address]  [place] [address]     Dear Colleague,    Thank you for referring your patient, Max Meade, to the [facility] WEIGHT MANAGEMENT CLINIC [place] at [facility]. Please see a copy of my visit note below.    Max Meade is a 41 year old male who is being evaluated via a billable video visit.      The patient has been notified of following:     \"This video visit will be conducted via a call between you and your physician/provider. We have found that certain health care needs can be provided without the need for an in-person physical exam.  This service lets us provide the care you need with a video conversation.  If a prescription is necessary we can send it directly to your pharmacy.  If lab work is needed we can place an order for that and you can then stop by our lab to have the test done at a later time.    Video visits are billed at different rates depending on your insurance coverage.  Please reach out to your insurance provider with any questions.    If during the course of the call the physician/provider feels a video visit is not appropriate, you will not be charged for this service.\"    Patient has given verbal consent for Video visit? Yes  How would you like to obtain your AVS? MyChart  Will anyone else be joining your video visit? No  {If patient encounters technical issues they should call [phone]      Video-Visit Details    Type of service:  Video Visit    Video Start Time: 10:30 AM  Video End Time: 10:58 AM    Originating Location (pt. Location): Home    Distant Location (provider location):  [facility] WE[facility] MANAGEMENT CLINIC [place]     Platform used for Video Visit: [employer]    During this virtual visit the patient is located in [place], patient verifies this as the location during the entirety of this visit.     RD explained ABN verbally to patient " "over virtual visit. Pt verbally selected option 1 on the ABN form which is good for 1 year. RD sent message to P [id number] to mail out ABN per protocol. Pt instructed to send ABN back to clinic for scanning and filing.      Weight Management Nutrition Consultation    Max Meade is a 41 year old male presents today for return weight management nutrition consultation.  Patient referred by Dr. Hdz on December 27, 2021.    Patient with Co-morbidities of obesity including:  Type II DM yes (insulin dependant, managed inpatient currently)  Renal Failure no  Sleep apnea yes  Hypertension no   Dyslipidemia no  Joint pain yes  Back pain yes  GERD no     Anthropometrics:  He reports checking weight weekly.    Current weight (pt report): 631 lbs (-17 lbs this month)    Weight checks over the past month (per pt report):  2/14: 648 lbs   2/23: 642 lbs   1/24: 648 lbs    Per notes from [facility] Bariatric RD:  12/13/21 (!) 293.8 kg (647 lb 12.8 oz)   12/08/21 (!) 287.9 kg (634 lb 12.8 oz)   09/27/21 (!) 292.2 kg (644 lb 3.2 oz)     Admitted in March 2021. Notes he started working on weight loss at this time. Weight per care everywhere:   321 kg (707 lb 10.8 oz) 03/18/2021          Estimated body mass index is 81.19 kg/m  as calculated from the following:    Height as of an earlier encounter on 12/27/21: 1.93 m (6' 4\").    Weight as of an earlier encounter on 12/27/21: 302.5 kg (667 lb).      Medications for Weight Loss:  Ozempic, may be switching to Wegovy   Topiramate    NUTRITION HISTORY  See MD note for details.  Per questionnaire, strong emotional component to eating. Readiness and confidence to change, rated 10 (very ready).    Saw [facility] bariatric RD 6 times, last visit 12/16/21. Per last note - Preparing for weight loss surgery. Needs to lose to 540 lbs for surgery (-70 lbs from initial consult). Pt states his goal is to achieve this in next 3 months.    Currently admitted (since " March) at [facility] in [facility] and working with inpatient RD on weight loss. Had been encouraged to follow 2300 calories/day for last 3-4 months, and had not seen any wt loss, and has actually gained (~20 lbs). Reports may be related to fluid retention.    He is getting 3 meals and 1 snack per day from the hospital. Reports no outside foods. He does not like hospital cooked veggies or greek yogurt.  Once per week slice of cake and once per day cookies.  Does not tolerate regular milk.     Jan 2022 - Pt reports he has lost 19 lbs since initial visit. He was following 1500 calories/day until 1/11, then started following 1350 calories/day. Repots no outside food from hospital meals and snacks. Rolling and moving better. Is able to stand for a minute which he was not doing before. Doing arm movements while in bed. He is interested in weight loss surgery consult with [facility].    Feb 2022 -  Pt reports he is doing PT 4 days per week. States he continues to follow the 1350 calories per day. States he has been talking to his cousin more on the phone which is helping distract between meals/snacks. His cousin has been very supportive to him and he wants to be able to move to [place] where his cousin lives. States he is able to stick to the diet plan at the hospital without issues, denies consuming outside food.     Today - He reports this month has been going well. States PT has been on hold, wants him to lose more weight first. Exercise session 30 mins 4 times per week doing seated exercise bike, medicine ball lifting. Has a couch in room where they are doing exercises.  Has a chart in his room to cross off lbs as he is losing, encouraged by this. Has an image of an outfit he wants to fit one day that helps with motivation. States he continues to follow hospital meal plan. Sometimes feeling hungry between dinner and HS snack, but is able to stay distracted - calling family, Youtube videos. Watches baking videos,  but states this does not trigger him to want to eat.     1350 calories/day:  Breakfast: protein shake + english muffin + 1 pc sausage  12: fresh fruit bowl (grapes, pineapple, strawberries and melon)  Lunch 2pm: protein shake with grapes  4pm: cucumbers and carrots   Dinner: 6 pm  1/2 turkey sandwich, 1/2 banana, cucumbers, carrots, coke zero and protein shake (fridays cake with dinner)  10 pm: cucumbers/carrots with string cheese - taking with medication   Beverages: water/crystallite (5-6 x 8 oz/day)    Progress Towards Previous Goals:  1) Follow 1350 calorie/day plan - Met, continues   - Continue to replace 2 meals per day with low-calorie protein shake.  2) Aim for 20-30 grams protein per meal/snack - Met, continues  3) Continue to use only calorie-free beverages. Consume 64+ oz fluid/day. - Met, continues  4) Use distractions between meals/snacks - calling your cousin, social media, journaling. - Met, continues  5) Keep up with PT instructions - Not met, stopped until losing more weight    Physical Activity:  Reports doing PT/OT and exercise 4 times per week for 30 mins and doing seated exercises while in bed.     Nutrition Prescription  Recommended energy/nutrient modification.  1500 calories/day (per MD)    Nutrition Diagnosis  Obesity r/t long history of positive energy balance aeb BMI >30. - improving/continues    Nutrition Intervention  Materials/education provided:  - Reviewed progress towards previous goals  - Reviewed strategy for weight loss - continuing meal plan as established. Encouraged consistency with exercise sessions. Discussed motivations for weight loss. Encouraged visual representations of motivations for frequent reminder.     - Praised pt on weight loss this past month and consistency with hypocaloric diet.   Patient demonstrates understanding.    Expected Engagement: good  Follow-Up Plans: calorie goals     Nutrition Goals  1) Follow 1350 calorie/day plan   - Continue to replace 2 meals  per day with low-calorie protein shake.  2) Aim for 20-30 grams protein per meal/snack  3) Continue to use only calorie-free beverages. Consume 64+ oz fluid/day.   4) Use distractions between meals/snacks - calling your cousin, social media, journaling.   5) Work to maintain consistency with exercise sessions.     Meal Replacement Shake Options:   *Protein Shake Criteria: no more than 210 Calories, at least 20 grams of protein, and less than 10 grams of sugar   [facility] smoothie (160 Calories, 20 g protein)   Premier Protein (160 Calories, 30 g protein)  Slim Fast Advanced Nutrition (180 Calories, 20 g protein)  Muscle Milk, lactose-free, 17 oz bottle (210 Calories, 30 g protein)  Integrated Supplements, no artificial sugars (110 Calories, 20 g protein)  Genepro, unflavored protein powder (60 Calories, 30 g protein)  Boost/Ensure Max (160 calories, 30 gm protein)   51fan[employer] Core Power (170 calories, 26 gm protein)    Meal Replacement Bar Options:  [facility] Protein Shake (160 Calories, 15 g protein)  Quest Protein Bars (190 Calories, 20 g protein)  Built Bar (170 Calories, 15-20 g protein)  One Protein Bar (210 calories, 20 g protein)  [place] Signature Protein Bar ([employer]) (190 Calories, 21 g protein)  Pure Protein Bars (180 Calories, 21 g protein)      Protein Sources for Weight Loss  [email or web address]     Carbohydrates  [email or web address]     Mindful Eating  [email or web address]     Diet Guidelines after Weight Loss Surgery  [email or web address]     Seated Exercises for Arms and Legs (can be done before or after surgery)  [email or web address]      Follow-Up:  1-2 month, PRN    Time spent with patient: 28 minutes.  Blossom Caal RD, LD      Again, thank you for allowing me to participate in the care of your patient.      Sincerely,    Blossom Caal RD

## 2022-03-28 ENCOUNTER — VIRTUAL VISIT (OUTPATIENT)
Dept: ENDOCRINOLOGY | Facility: CLINIC | Age: 42
End: 2022-03-28
Payer: MEDICARE

## 2022-03-28 DIAGNOSIS — E11.9 TYPE 2 DIABETES MELLITUS WITHOUT COMPLICATION, WITHOUT LONG-TERM CURRENT USE OF INSULIN (H): ICD-10-CM

## 2022-03-28 DIAGNOSIS — E66.01 MORBID OBESITY (H): Primary | ICD-10-CM

## 2022-03-28 PROCEDURE — 99213 OFFICE O/P EST LOW 20 MIN: CPT | Mod: 95 | Performed by: INTERNAL MEDICINE

## 2022-03-28 NOTE — PROGRESS NOTES
Max is a 41 year old who is being evaluated via a billable video visit.      How would you like to obtain your AVS? MyChart  If the video visit is dropped, the invitation should be resent by: [phone]  Will anyone else be joining your video visit? No  During this virtual visit the patient is located in [place], patient verifies this as the location during the entirety of this visit.     Video-Visit Details    Type of service:  Video Visit    Start: 03/28/2022 10:02 am  Stop: 03/28/2022 10:30 am    Originating Location (pt. Location): Assisted Living    Distant Location (provider location):  [facility] WEIGHT MANAGEMENT CLINIC [place]     Platform used for Video Visit: [employer]Cow

## 2022-03-28 NOTE — LETTER
Date:March 28, 2022      Provider requested that no letter be sent. Do not send.       [facility]

## 2022-03-28 NOTE — LETTER
3/28/2022       RE: Max Meade  [address] [facility] [address]  [facility] [address]     Dear Colleague,    Thank you for referring your patient, Max Meade, to the [facility] [place] at [facility]. Please see a copy of my visit note below.    Max is a 41 year old who is being evaluated via a billable video visit.      How would you like to obtain your AVS? MyChart  If the video visit is dropped, the invitation should be resent by: [phone]  Will anyone else be joining your video visit? No  During this virtual visit the patient is located in [place], patient verifies this as the location during the entirety of this visit.     Video-Visit Details    Type of service:  Video Visit    Start: 2022 10:02 am  Stop: 2022 10:30 am    Originating Location (pt. Location): Assisted Living    Distant Location (provider location):  [facility] [place]     Platform used for Video Visit: Socowave          [facility] Medical Weight Management Note     Max Meade  MRN:  [id number]  :  1980  JOSEFINA:  1/10/2022    Dear Colleagues,    I had the pleasure of seeing your patient Max Meade.  He is a 41 year old male who I am continuing to see for treatment of obesity related to:     2021   I have the following health issues associated with obesity: Type II Diabetes, Sleep Apnea   I have the following symptoms associated with obesity: Knee Pain, Depression, Lower Extremity Swelling, Back Pain, Fatigue     CURRENT WEIGHT:   0 lbs 0 oz Reports 634    Wt Readings from Last 4 Encounters:   22 (!) 295.7 kg (652 lb)   01/10/22 (!) 297.6 kg (656 lb)   21 (!) 302.5 kg (667 lb)     Height:  Data Unavailable  Body Mass Index:  There is no height or weight on file to calculate BMI.  Vitals:  B/P: Data Unavailable, P: Data Unavailable    Initial consult weight was 667 on down  11.  Weight change since last seen on 1/10/2022 is down 18 pounds.   Total loss is 15 pounds.    INTERVAL HISTORY:  Says now following 1300 calories diet in hospital and denies any outside food. Now semaglutide, now at 1 mg/week. Topiramate is 25 mg/d.     Diet Recall Review with Patient 12/21/2021   Do you typically eat breakfast? Yes   If you do eat breakfast, what types of food do you eat? [place] toast,butler, sausage, eggs   Do you typically eat lunch? Yes   If you do eat lunch, what types of food do you typically eat?  Tuna sandwich   Do you typically eat supper? Yes   If you do eat supper, what types of food do you typically eat? Chicken [place]   Do you typically eat snacks? No   Do you like vegetables?  No   Do you drink water? Yes   How many glasses of juice do you drink in a typical day? 0   How many of glasses of milk do you drink in a typical day? 0   If you do drink milk, what type? N/A   How many 8oz glasses of sugar containing drinks such as Simba-Aid/sweet tea do you drink in a day? 0   How many cans/bottles of sugar pop/soda/tea/sports drinks do you drink in a day? 0   How many cans/bottles of diet pop/soda/tea or sports drink do you drink in a day? 7   How often do you have a drink of alcohol? Never     MEDICATIONS:   Current Outpatient Medications   Medication     acetaminophen (TYLENOL) 500 MG tablet     albuterol (PROVENTIL) (2.5 MG/3ML) 0.083% neb solution     apixaban ANTICOAGULANT (ELIQUIS) 2.5 MG tablet     atorvastatin (LIPITOR) 10 MG tablet     bumetanide (BUMEX) 1 MG tablet     busPIRone (BUSPAR) 10 MG tablet     cholecalciferol 25 MCG (1000 UT) TABS     cyclobenzaprine (FLEXERIL) 5 MG tablet     DULoxetine (CYMBALTA) 60 MG capsule     famotidine (PEPCID) 20 MG tablet     gabapentin (NEURONTIN) 300 MG capsule     hydrOXYzine (VISTARIL) 25 MG capsule     insulin aspart (NOVOLOG VIAL) 100 UNITS/ML vial     insulin glargine (LANTUS VIAL) 100 UNIT/ML vial     insulin lispro (HUMALOG VIAL) 100  UNIT/ML vial     lactulose (CHRONULAC) 10 GM/15ML solution     Magnesium Oxide 500 MG TABS     melatonin 3 MG tablet     mineral oil-hydrophilic petrolatum (AQUAPHOR) external ointment     Multiple Vitamins-Minerals (MULTI VITAMIN  /MINERALS) TABS     naloxone (NARCAN) 4 MG/0.1ML nasal spray     pantoprazole (PROTONIX) 40 MG EC tablet     senna-docusate (SENOKOT-S/PERICOLACE) 8.6-50 MG tablet     topiramate (TOPAMAX) 25 MG tablet     zolpidem (AMBIEN) 5 MG tablet     No current facility-administered medications for this visit.     Weight Loss Medication History Reviewed With Patient 1/31/2022   Which weight loss medications are you currently taking on a regular basis?  Topamax (topiramate)   Are you having any side effects from the weight loss medication that we have prescribed you? No     ASSESSMENT:   Maintain 1350 calories diet in hospital and semaglutide 1 mg/week and increase topiramate from 50 BID to 75 BID. Will again discuss barisurgery with surgery colleagues, but likely will need more weight loss before can consider surgery. Chen Riojas  [phone] for discussion fo Wegovy    FOLLOW-UP:    12 weeks.    Sincerely,  Erlin Hdz MD    Vitals skipped per provider.    Rea Multani, EMT        Again, thank you for allowing me to participate in the care of your patient.      Sincerely,    Erlin Hdz MD

## 2022-03-28 NOTE — PROGRESS NOTES
Return Medical Weight Management Note     Max Meade  MRN:  [id number]  :  1980  JOSEFINA:  1/10/2022    Dear Colleagues,    I had the pleasure of seeing your patient Max Meade.  He is a 41 year old male who I am continuing to see for treatment of obesity related to:     2021   I have the following health issues associated with obesity: Type II Diabetes, Sleep Apnea   I have the following symptoms associated with obesity: Knee Pain, Depression, Lower Extremity Swelling, Back Pain, Fatigue     CURRENT WEIGHT:   0 lbs 0 oz Reports 634    Wt Readings from Last 4 Encounters:   22 (!) 295.7 kg (652 lb)   01/10/22 (!) 297.6 kg (656 lb)   21 (!) 302.5 kg (667 lb)     Height:  Data Unavailable  Body Mass Index:  There is no height or weight on file to calculate BMI.  Vitals:  B/P: Data Unavailable, P: Data Unavailable    Initial consult weight was 667 on down 11.  Weight change since last seen on 1/10/2022 is down 18 pounds.   Total loss is 15 pounds.    INTERVAL HISTORY:  Says now following 1300 calories diet in hospital and denies any outside food. Now semaglutide, now at 1 mg/week. Topiramate is 25 mg/d.     Diet Recall Review with Patient 2021   Do you typically eat breakfast? Yes   If you do eat breakfast, what types of food do you eat? [place] toast,butler, sausage, eggs   Do you typically eat lunch? Yes   If you do eat lunch, what types of food do you typically eat?  Tuna sandwich   Do you typically eat supper? Yes   If you do eat supper, what types of food do you typically eat? Chicken [place]   Do you typically eat snacks? No   Do you like vegetables?  No   Do you drink water? Yes   How many glasses of juice do you drink in a typical day? 0   How many of glasses of milk do you drink in a typical day? 0   If you do drink milk, what type? N/A   How many 8oz glasses of sugar containing drinks such as Simba-Aid/sweet tea do you drink in a day? 0   How many cans/bottles of sugar  pop/soda/tea/sports drinks do you drink in a day? 0   How many cans/bottles of diet pop/soda/tea or sports drink do you drink in a day? 7   How often do you have a drink of alcohol? Never     MEDICATIONS:   Current Outpatient Medications   Medication     acetaminophen (TYLENOL) 500 MG tablet     albuterol (PROVENTIL) (2.5 MG/3ML) 0.083% neb solution     apixaban ANTICOAGULANT (ELIQUIS) 2.5 MG tablet     atorvastatin (LIPITOR) 10 MG tablet     bumetanide (BUMEX) 1 MG tablet     busPIRone (BUSPAR) 10 MG tablet     cholecalciferol 25 MCG (1000 UT) TABS     cyclobenzaprine (FLEXERIL) 5 MG tablet     DULoxetine (CYMBALTA) 60 MG capsule     famotidine (PEPCID) 20 MG tablet     gabapentin (NEURONTIN) 300 MG capsule     hydrOXYzine (VISTARIL) 25 MG capsule     insulin aspart (NOVOLOG VIAL) 100 UNITS/ML vial     insulin glargine (LANTUS VIAL) 100 UNIT/ML vial     insulin lispro (HUMALOG VIAL) 100 UNIT/ML vial     lactulose (CHRONULAC) 10 GM/15ML solution     Magnesium Oxide 500 MG TABS     melatonin 3 MG tablet     mineral oil-hydrophilic petrolatum (AQUAPHOR) external ointment     Multiple Vitamins-Minerals (MULTI VITAMIN  /MINERALS) TABS     naloxone (NARCAN) 4 MG/0.1ML nasal spray     pantoprazole (PROTONIX) 40 MG EC tablet     senna-docusate (SENOKOT-S/PERICOLACE) 8.6-50 MG tablet     topiramate (TOPAMAX) 25 MG tablet     zolpidem (AMBIEN) 5 MG tablet     No current facility-administered medications for this visit.     Weight Loss Medication History Reviewed With Patient 1/31/2022   Which weight loss medications are you currently taking on a regular basis?  Topamax (topiramate)   Are you having any side effects from the weight loss medication that we have prescribed you? No     ASSESSMENT:   Maintain 1350 calories diet in hospital and semaglutide 1 mg/week and increase topiramate from 50 BID to 75 BID. Will again discuss barisurgery with surgery colleagues, but likely will need more weight loss before can consider  surgery. Chen Riojas  [phone] for discussion fo Wegovy    FOLLOW-UP:    12 weeks.    Sincerely,  Erlin Hdz MD

## 2022-04-03 ENCOUNTER — HEALTH MAINTENANCE LETTER (OUTPATIENT)
Age: 42
End: 2022-04-03

## 2022-05-23 ENCOUNTER — TELEPHONE (OUTPATIENT)
Dept: SURGERY | Facility: CLINIC | Age: 42
End: 2022-05-23
Payer: MEDICARE

## 2022-05-23 NOTE — TELEPHONE ENCOUNTER
Called LINDSEY Riojas for update on patient.    Still at same facility, unable to find facilities to accommodate his weight in the [facility] area.    He has been more motivated to participate in therapies and working with exercise physiologist on exercise and mobility.     Starting wt 667 lbs  Weight last week: 597 lbs      Still taking ozempic and topiramate.    Seeing Blossom BURTON 5/25/22 and Dr Wilder Singletary.      Recommended increase ozempic to 2mg if they were unable to transition to Wegovy.    Patient interested in weight loss surgery which we can discuss with surgeon when weight loss goal of 100 lbs lost met and patient mobile.    Sherice Schmid PA-C

## 2022-05-24 NOTE — PROGRESS NOTES
"Max Maede is a 41 year old male who is being evaluated via a billable video visit.      The patient has been notified of following:     \"This video visit will be conducted via a call between you and your physician/provider. We have found that certain health care needs can be provided without the need for an in-person physical exam.  This service lets us provide the care you need with a video conversation.  If a prescription is necessary we can send it directly to your pharmacy.  If lab work is needed we can place an order for that and you can then stop by our lab to have the test done at a later time.    Video visits are billed at different rates depending on your insurance coverage.  Please reach out to your insurance provider with any questions.    If during the course of the call the physician/provider feels a video visit is not appropriate, you will not be charged for this service.\"    Patient has given verbal consent for Video visit? Yes  How would you like to obtain your AVS? MyChart  Will anyone else be joining your video visit? No  {If patient encounters technical issues they should call [phone]      Video-Visit Details    Type of service:  Video Visit    Video Start Time: 10:22 AM  Video End Time: 10:45 AM    Originating Location (pt. Location): Home    Distant Location (provider location):  [facility]     Platform used for Video Visit: Towi    During this virtual visit the patient is located in [place], patient verifies this as the location during the entirety of this visit.     RD explained ABN verbally to patient over virtual visit. Pt verbally selected option 1 on the ABN form which is good for 1 year. JOSEPHINE sent message to [id number] to mail out ABN per protocol. Pt instructed to send ABN back to clinic for scanning and filing.      Weight Management Nutrition Consultation    Max Meade is a 41 year old male presents today for " "return weight management nutrition consultation.  Patient referred by Dr. Hdz on December 27, 2021.    Per surgery PA - Patient interested in weight loss surgery which we can discuss with surgeon when weight loss goal of 100 lbs lost met and patient mobile.    Patient with Co-morbidities of obesity including:  Type II DM yes (insulin dependant, managed inpatient currently)  Renal Failure no  Sleep apnea yes  Hypertension no   Dyslipidemia no  Joint pain yes  Back pain yes  GERD no     Anthropometrics:  He reports checking weight weekly.    Current weight (pt report): 597 lbs (-34 lbs in past 2 months)    Weight history (per pt report):  3/28/22: 631 lbs   2/14/22: 648 lbs   2/23/22: 642 lbs   1/24/22: 648 lbs    Estimated body mass index is 81.19 kg/m  as calculated from the following:    Height as of an earlier encounter on 12/27/21: 1.93 m (6' 4\").    Weight as of an earlier encounter on 12/27/21: 302.5 kg (667 lb).    Per notes from [facility] RD:  12/13/21 (!) 293.8 kg (647 lb 12.8 oz)   12/08/21 (!) 287.9 kg (634 lb 12.8 oz)   09/27/21 (!) 292.2 kg (644 lb 3.2 oz)     Admitted in March 2021. Notes he started working on weight loss at this time. Weight per care everywhere:   321 kg (707 lb 10.8 oz) 03/18/2021        Medications for Weight Loss:  Ozempic, may be switching to Wegovy   Topiramate    NUTRITION HISTORY  See MD note for details.  Per questionnaire, strong emotional component to eating. Readiness and confidence to change, rated 10 (very ready).    Saw [facility] bariatric RD 6 times, last visit 12/16/21. Per last note - Preparing for weight loss surgery. Needs to lose to 540 lbs for surgery (-70 lbs from initial consult). Pt states his goal is to achieve this in next 3 months.    Currently admitted (since March) at [facility] in [facility] and working with inpatient RD on weight loss. Had been encouraged to follow 2300 calories/day for last 3-4 months, and had not " seen any wt loss, and has actually gained (~20 lbs). Reports may be related to fluid retention.    He is getting 3 meals and 1 snack per day from the hospital. Reports no outside foods. He does not like hospital cooked veggies or greek yogurt.  Once per week slice of cake and once per day cookies.  Does not tolerate regular milk.     Jan 2022 - Pt reports he has lost 19 lbs since initial visit. He was following 1500 calories/day until 1/11, then started following 1350 calories/day. Repots no outside food from hospital meals and snacks. Rolling and moving better. Is able to stand for a minute which he was not doing before. Doing arm movements while in bed. He is interested in weight loss surgery consult with [facility].    Feb 2022 -  Pt reports he is doing PT 4 days per week. States he continues to follow the 1350 calories per day. States he has been talking to his cousin more on the phone which is helping distract between meals/snacks. His cousin has been very supportive to him and he wants to be able to move to [place] where his cousin lives. States he is able to stick to the diet plan at the hospital without issues, denies consuming outside food.     April 2022 - He reports this month has been going well. States PT has been on hold, wants him to lose more weight first. Exercise session 30 mins 4 times per week doing seated exercise bike, medicine ball lifting. Has a couch in room where they are doing exercises.  Has a chart in his room to cross off lbs as he is losing, encouraged by this. Has an image of an outfit he wants to fit one day that helps with motivation. States he continues to follow hospital meal plan. Sometimes feeling hungry between dinner and HS snack, but is able to stay distracted - calling family, [employer] videos. Watches baking videos, but states this does not trigger him to want to eat.     Today - Not feeling as hungry before bed with higher protein/calorie pm snack. He c/o constipation, likely r/t  high protein diet and unable to ambulate.  Doing exercise M-Th with PT, and some on the weekends on his own. Doing a seated peddle, medicine ball, weighted bar, leg kicks, marching in place, and dumb bells. Will start practicing walking once he gets to weight 580 lbs.     1500 calories/day (30 gm pro/meal, <60 gm CHO/meal, <15 gm CHO/snack):  Breakfast: protein shake + english muffin + 1 pc sausage + egg   12 pm snack: 1/2 cup fresh fruit bowl (grapes, pineapple, strawberries and melon)  Lunch 2pm: protein shake with watermelon/grapes  4pm snack: carrots sticks - sometimes feeling too full to finish   Dinner: 6 pm 1/2 turkey sandwich (1 pc bread + lettuce + tomato + mustard and light sesay), string cheese, 1/2 banana (or GF cookie on Friday), coke zero and protein shake (fridays cake with dinner)  10 pm: Greek yogurt and honey and string cheese - taking with medication   Beverages: water/crystallite (5-6 x 8 oz/day)    Progress Towards Previous Goals:  1) Follow 1350 calorie/day plan - Calories increased slightly with plan to improve satiety which is appropriate, continues to lose weight well with ~1500 calories/day   - Continue to replace 2 meals per day with low-calorie protein shake.  2) Aim for 20-30 grams protein per meal/snack - Met, continues  3) Continue to use only calorie-free beverages. Consume 64+ oz fluid/day. - Met, continues  4) Use distractions between meals/snacks - calling your cousin, social media, journaling.  - Met, continues  5) Work to maintain consistency with exercise sessions. - Met, continues    Physical Activity:  Reports doing PT/OT and exercise 4 times per week for 30 mins and doing seated exercises while in bed.     Nutrition Prescription  Recommended energy/nutrient modification.  1500 calories/day (per MD)    Nutrition Diagnosis  Obesity r/t long history of positive energy balance aeb BMI >30. - improving/continues    Nutrition Intervention  Materials/education provided:  - Reviewed  progress towards previous goals  - Reviewed strategy for weight loss - continuing meal plan as established. Encouraged consistency with exercise sessions.  - Praised pt on weight loss this past month and consistency with hypocaloric diet.   - Discussed dietary strategies for coping with constipation - fiber supplement or increased non-starchy veggies. As he works on ambulation, should help as well.  Patient demonstrates understanding.    Expected Engagement: good  Follow-Up Plans: calorie goals     Nutrition Goals  1) Follow 1500 calorie/day plan   - Continue to replace 2 meals per day with low-calorie protein shake.  2) Aim for less than 60 gm carb per meal and less than 15 gm carb per snack  3) Use distractions between meals/snacks - calling your cousin, social media, journaling.   4) Work to maintain consistency with exercise sessions.     Meal Replacement Shake Options:   *Protein Shake Criteria: no more than 210 Calories, at least 20 grams of protein, and less than 10 grams of sugar   [facility] smoothie (160 Calories, 20 g protein)   Premier Protein (160 Calories, 30 g protein)  Slim Fast Advanced Nutrition (180 Calories, 20 g protein)  Muscle Milk, lactose-free, 17 oz bottle (210 Calories, 30 g protein)  Integrated Supplements, no artificial sugars (110 Calories, 20 g protein)  Genepro, unflavored protein powder (60 Calories, 30 g protein)  Boost/Ensure Max (160 calories, 30 gm protein)   C[employer]d [employer] Core Power (170 calories, 26 gm protein)    Meal Replacement Bar Options:  [facility] Protein Shake (160 Calories, 15 g protein)  Quest Protein Bars (190 Calories, 20 g protein)  Built Bar (170 Calories, 15-20 g protein)  One Protein Bar (210 calories, 20 g protein)  Poplar Signature Protein Bar ([employer]) (190 Calories, 21 g protein)  Pure Protein Bars (180 Calories, 21 g protein)      Protein Sources for Weight Loss  [email or web address]     Carbohydrates  [email or web address]     Mindful  Eating  [email or web address]     Diet Guidelines after Weight Loss Surgery  [email or web address]     Seated Exercises for Arms and Legs (can be done before or after surgery)  [email or web address]      Follow-Up:  1-2 month, PRN    Time spent with patient: 23 minutes.  Blossom Caal RD, LD

## 2022-05-26 ENCOUNTER — VIRTUAL VISIT (OUTPATIENT)
Dept: ENDOCRINOLOGY | Facility: CLINIC | Age: 42
End: 2022-05-26
Payer: MEDICARE

## 2022-05-26 DIAGNOSIS — E66.9 OBESITY: ICD-10-CM

## 2022-05-26 DIAGNOSIS — E11.9 TYPE 2 DIABETES MELLITUS WITHOUT COMPLICATION, WITHOUT LONG-TERM CURRENT USE OF INSULIN (H): ICD-10-CM

## 2022-05-26 DIAGNOSIS — Z71.3 NUTRITIONAL COUNSELING: Primary | ICD-10-CM

## 2022-05-26 PROCEDURE — 97803 MED NUTRITION INDIV SUBSEQ: CPT | Mod: GA | Performed by: DIETITIAN, REGISTERED

## 2022-05-26 NOTE — LETTER
Date:May 26, 2022      Patient was self referred, no letter generated. Do not send.        [facility] Health Information

## 2022-05-26 NOTE — PATIENT INSTRUCTIONS
Joe Santana,    Follow-up with RD in 6 weeks.    Thank you,    Blossom Caal, RD, LD  If you would like to schedule or reschedule an appointment with the RD, please call [phone]    Nutrition Goals  1) Follow 1500 calorie/day plan   - Continue to replace 2 meals per day with low-calorie protein shake.  2) Aim for less than 60 gm carb per meal and less than 15 gm carb per snack  3) Use distractions between meals/snacks - calling your cousin, social media, journaling.   4) Work to maintain consistency with exercise sessions.     Meal Replacement Shake Options:   *Protein Shake Criteria: no more than 210 Calories, at least 20 grams of protein, and less than 10 grams of sugar   [facility] smoothie (160 Calories, 20 g protein)   Premier Protein (160 Calories, 30 g protein)  Slim Fast Advanced Nutrition (180 Calories, 20 g protein)  Muscle Milk, lactose-free, 17 oz bottle (210 Calories, 30 g protein)  Integrated Supplements, no artificial sugars (110 Calories, 20 g protein)  Genepro, unflavored protein powder (60 Calories, 30 g protein)  Boost/Ensure Max (160 calories, 30 gm protein)   [employer] Core Power (170 calories, 26 gm protein)    Meal Replacement Bar Options:  [facility] Protein Shake (160 Calories, 15 g protein)  Quest Protein Bars (190 Calories, 20 g protein)  Built Bar (170 Calories, 15-20 g protein)  One Protein Bar (210 calories, 20 g protein)  [place] Signature Protein Bar ([employer]co) (190 Calories, 21 g protein)  Pure Protein Bars (180 Calories, 21 g protein)      Protein Sources for Weight Loss  [email or web address]     Carbohydrates  [email or web address]     Mindful Eating  [email or web address]     Diet Guidelines after Weight Loss Surgery  [email or web address]     Seated Exercises for Arms and Legs (can be done before or after surgery)  [email or web address]        Interested in working with a health ?  Health coaches work with you to improve your  overall health and wellbeing.  They look at the whole person, and may involve discussion of different areas of life, including, but not limited to the four pillars of health (sleep, exercise, nutrition, and stress management). Discuss with your care team if you would like to start working a health .    Health Coaching-3 Pack:    $99 for three health coaching visits    Visits may be done in person or via phone    Coaching is a partnership between the  and the client; Coaches do not prescribe or diagnose    Coaching helps inspire the client to reach his/her personal goals      COMPREHENSIVE WEIGHT MANAGEMENT PROGRAM  VIRTUAL SUPPORT GROUPS    For Support Group Information:      We offer support groups for patients who are working on weight loss and considering, preparing for or have had weight loss surgery.   There is no cost for this opportunity.  You are invited to attend the?Virtual Support Groups?provided by any of the following locations:    [facility] via Microsoft Teams with Sarah Ceja RN  2.   [place] via HealthSynch with Obed Davenport, PhD, LP  3.   [place] via HealthSynch with Gabi Winchester RN  4.   [facility] via Microsoft Teams with Gabi Walters [facility]    The following Support Group information can also be found on our website:  [email or web address]      [facility] Weight Loss Surgery Support Group    [facility] Weight Loss Surgery Support Group  The support group is a patient-lead forum that meets monthly to share experiences, encouragement and education. It is open to those who have had weight loss surgery, are scheduled for surgery, and those who are considering surgery.   WHEN: This group meets on the 3rd Wednesday of each month from 5:00PM - 6:00PM virtually using Microsoft Teams.   FACILITATOR: Led by Sarah Ceja, RD, LD, RN, the program's Clinical Coordinator.   TO REGISTER: Please  "contact the clinic via [employer] or call the nurse line directly at [phone] to inform our staff that you would like an invite sent to you and to let us know the email you would like the invite sent to. Prior to the meeting, a link with directions on how to join the meeting will be sent to you.    2022 Meetings  January 19: \"Let's Talk\" a time for the group to share.  February 16: \"Let's Talk\" a time for the group to share.  March 16: Guest Speakers: Psychologists, Rosina Polanco, PhD, and Katrin Marie PsyD,  April 20: Guest Speaker: Health , Jacquelin Churchill, [facility],CHES, [facility]  May 18: Guest Speaker: DietitianZay, JOSEPHINE,   Geraldine 15: \"Let's Talk\" a time for the group to share.  July 20: \"Let's Talk\" a time for the group to share.  August 17: TBA  September 21: TBA  October 19: Guest Speaker: Dr Geovany Amezcua MD Pulmonologist and Sleep Medicine Physician, \"Getting a Good Night's Sleep\".  November 16: TBA  December 21: TBA    [facility] Support Groups    Connections: Bariatric Care Support Group?  This is open to all [facility] (and those external to this program) pre- and post- operative bariatric surgery patients as well as their support system.   WHEN: This group meets the 2nd Tuesday of each month from 6:30 PM - 8:00 PM virtually using Microsoft Teams.   FACILITATOR: Led by Obed Davenport, Ph.D who is a Licensed Psychologist with the [facility] Comprehensive Weight Management Program.   TO REGISTER: Please send an email to Obed Davenport, Ph.D.,  at?[email or web address]?if you would like an invitation to the group and to learn about using Microsoft Teams.    2022 Meetings  January 11: Rupa Melgar, PharmD, Pharmacy Resident at [facility], \"Medications and Bariatric Surgery\".  February 8: Open Forum  March 8  April 12  May 10  Geraldine 14    Connections: Post-Operative Bariatric Surgery Support Group  This is a support group for [facility] bariatric patients (and those external to [facility]) who have had bariatric surgery and are at least 3 months post-surgery.  WHEN: This support group meets the 4th Wednesday of the month from 11:00 AM - 12:00 PM virtually using Microsoft Teams.   FACILITATOR: Led by Certified Bariatric Nurse, Gabi Winchester RN.   TO REGISTER: Please send an email to Gabi at [email or web address] if you would like an invitation to the group and to learn about using Microsoft Teams.    2022 Meetings  January 26  February 23  March 23  April 27  May 25  Geraldine 22    [facility] Healthy Lifestyle Virtual Support Group    Healthy Lifestyle Virtual Support Group?  This is 60 minutes of small group guided discussion, support and resources. All are welcome who want a healthy lifestyle.  WHEN: This group meets monthly on a Friday from 12:30 PM - 1:30 PM virtually using Microsoft Teams.   FACILITATOR: Led by National Board Certified Health and , Gabi Walters [facility].   TO REGISTER: Please send an email to Gabi [email or web address] to receive monthly invites to the group or if you have any questions about having a health .  Prior to the meeting, a link with directions on how to join the meeting will be sent to you.    2022 Meetings  MAY 20: OPEN FORUM  JUNE: 24:  Setting Limits and BoundariesGabi  July 29: OPEN FORUM  August 26: Special Guest Registered Dietician Yola De La Fuente  Sept 30: OPEN FORUM  Oct 28, Dorcas Vega National Board Certified Health   Nov 18: Navigating How to Eat Around the Holidays with JOSEPHINE Hu  Dec 16: Changing your relationship with movement with  Jacquelin National Board Certified Health

## 2022-05-26 NOTE — LETTER
"5/26/2022       RE: Max Meade  [address]  [place] [address]     Dear Colleague,    Thank you for referring your patient, Max Meade, to the [facility] [place] at [facility]. Please see a copy of my visit note below.    Max Meade is a 41 year old male who is being evaluated via a billable video visit.      The patient has been notified of following:     \"This video visit will be conducted via a call between you and your physician/provider. We have found that certain health care needs can be provided without the need for an in-person physical exam.  This service lets us provide the care you need with a video conversation.  If a prescription is necessary we can send it directly to your pharmacy.  If lab work is needed we can place an order for that and you can then stop by our lab to have the test done at a later time.    Video visits are billed at different rates depending on your insurance coverage.  Please reach out to your insurance provider with any questions.    If during the course of the call the physician/provider feels a video visit is not appropriate, you will not be charged for this service.\"    Patient has given verbal consent for Video visit? Yes  How would you like to obtain your AVS? MyChart  Will anyone else be joining your video visit? No  {If patient encounters technical issues they should call [phone]      Video-Visit Details    Type of service:  Video Visit    Video Start Time: 10:22 AM  Video End Time: 10:45 AM    Originating Location (pt. Location): Home    Distant Location (provider location):  [facility] [place]     Platform used for Video Visit: [email or web address]    During this virtual visit the patient is located in [place], patient verifies this as the location during the entirety of this visit.     RD explained ABN verbally to patient " "over virtual visit. Pt verbally selected option 1 on the ABN form which is good for 1 year. RD sent message to P [id number] to mail out ABN per protocol. Pt instructed to send ABN back to clinic for scanning and filing.      Weight Management Nutrition Consultation    Max Meade is a 41 year old male presents today for return weight management nutrition consultation.  Patient referred by Dr. Hdz on December 27, 2021.    Per surgery PA - Patient interested in weight loss surgery which we can discuss with surgeon when weight loss goal of 100 lbs lost met and patient mobile.    Patient with Co-morbidities of obesity including:  Type II DM yes (insulin dependant, managed inpatient currently)  Renal Failure no  Sleep apnea yes  Hypertension no   Dyslipidemia no  Joint pain yes  Back pain yes  GERD no     Anthropometrics:  He reports checking weight weekly.    Current weight (pt report): 597 lbs (-34 lbs in past 2 months)    Weight history (per pt report):  3/28/22: 631 lbs   2/14/22: 648 lbs   2/23/22: 642 lbs   1/24/22: 648 lbs    Estimated body mass index is 81.19 kg/m  as calculated from the following:    Height as of an earlier encounter on 12/27/21: 1.93 m (6' 4\").    Weight as of an earlier encounter on 12/27/21: 302.5 kg (667 lb).    Per notes from [facility] Bariatric RD:  12/13/21 (!) 293.8 kg (647 lb 12.8 oz)   12/08/21 (!) 287.9 kg (634 lb 12.8 oz)   09/27/21 (!) 292.2 kg (644 lb 3.2 oz)     Admitted in March 2021. Notes he started working on weight loss at this time. Weight per care everywhere:   321 kg (707 lb 10.8 oz) 03/18/2021        Medications for Weight Loss:  Ozempic, may be switching to Wegovy   Topiramate    NUTRITION HISTORY  See MD note for details.  Per questionnaire, strong emotional component to eating. Readiness and confidence to change, rated 10 (very ready).    Saw [facility] bariatric RD 6 times, last visit 12/16/21. Per last note - Preparing for weight " loss surgery. Needs to lose to 540 lbs for surgery (-70 lbs from initial consult). Pt states his goal is to achieve this in next 3 months.    Currently admitted (since March) at [facility] in [facility] and working with inpatient RD on weight loss. Had been encouraged to follow 2300 calories/day for last 3-4 months, and had not seen any wt loss, and has actually gained (~20 lbs). Reports may be related to fluid retention.    He is getting 3 meals and 1 snack per day from the hospital. Reports no outside foods. He does not like hospital cooked veggies or greek yogurt.  Once per week slice of cake and once per day cookies.  Does not tolerate regular milk.     Jan 2022 - Pt reports he has lost 19 lbs since initial visit. He was following 1500 calories/day until 1/11, then started following 1350 calories/day. Repots no outside food from hospital meals and snacks. Rolling and moving better. Is able to stand for a minute which he was not doing before. Doing arm movements while in bed. He is interested in weight loss surgery consult with [facility].    Feb 2022 -  Pt reports he is doing PT 4 days per week. States he continues to follow the 1350 calories per day. States he has been talking to his cousin more on the phone which is helping distract between meals/snacks. His cousin has been very supportive to him and he wants to be able to move to [place] where his cousin lives. States he is able to stick to the diet plan at the hospital without issues, denies consuming outside food.     April 2022 - He reports this month has been going well. States PT has been on hold, wants him to lose more weight first. Exercise session 30 mins 4 times per week doing seated exercise bike, medicine ball lifting. Has a couch in room where they are doing exercises.  Has a chart in his room to cross off lbs as he is losing, encouraged by this. Has an image of an outfit he wants to fit one day that helps with motivation. States he continues  to follow hospital meal plan. Sometimes feeling hungry between dinner and HS snack, but is able to stay distracted - calling family, RiffR[employer] videos. Watches baking videos, but states this does not trigger him to want to eat.     Today - Not feeling as hungry before bed with higher protein/calorie pm snack. He c/o constipation, likely r/t high protein diet and unable to ambulate.  Doing exercise M-Th with PT, and some on the weekends on his own. Doing a seated peddle, medicine ball, weighted bar, leg kicks, marching in place, and dumb bells. Will start practicing walking once he gets to weight 580 lbs.     1500 calories/day (30 gm pro/meal, <60 gm CHO/meal, <15 gm CHO/snack):  Breakfast: protein shake + english muffin + 1 pc sausage + egg   12 pm snack: 1/2 cup fresh fruit bowl (grapes, pineapple, strawberries and melon)  Lunch 2pm: protein shake with watermelon/grapes  4pm snack: carrots sticks - sometimes feeling too full to finish   Dinner: 6 pm 1/2 turkey sandwich (1 pc bread + lettuce + tomato + mustard and light sesay), string cheese, 1/2 banana (or GF cookie on Friday), coke zero and protein shake (fridays cake with dinner)  10 pm: Greek yogurt and honey and string cheese - taking with medication   Beverages: water/crystallite (5-6 x 8 oz/day)    Progress Towards Previous Goals:  1) Follow 1350 calorie/day plan - Calories increased slightly with plan to improve satiety which is appropriate, continues to lose weight well with ~1500 calories/day   - Continue to replace 2 meals per day with low-calorie protein shake.  2) Aim for 20-30 grams protein per meal/snack - Met, continues  3) Continue to use only calorie-free beverages. Consume 64+ oz fluid/day. - Met, continues  4) Use distractions between meals/snacks - calling your cousin, social media, journaling.  - Met, continues  5) Work to maintain consistency with exercise sessions. - Met, continues    Physical Activity:  Reports doing PT/OT and exercise 4 times  per week for 30 mins and doing seated exercises while in bed.     Nutrition Prescription  Recommended energy/nutrient modification.  1500 calories/day (per MD)    Nutrition Diagnosis  Obesity r/t long history of positive energy balance aeb BMI >30. - improving/continues    Nutrition Intervention  Materials/education provided:  - Reviewed progress towards previous goals  - Reviewed strategy for weight loss - continuing meal plan as established. Encouraged consistency with exercise sessions.  - Praised pt on weight loss this past month and consistency with hypocaloric diet.   - Discussed dietary strategies for coping with constipation - fiber supplement or increased non-starchy veggies. As he works on ambulation, should help as well.  Patient demonstrates understanding.    Expected Engagement: good  Follow-Up Plans: calorie goals     Nutrition Goals  1) Follow 1500 calorie/day plan   - Continue to replace 2 meals per day with low-calorie protein shake.  2) Aim for less than 60 gm carb per meal and less than 15 gm carb per snack  3) Use distractions between meals/snacks - calling your cousin, social media, journaling.   4) Work to maintain consistency with exercise sessions.     Meal Replacement Shake Options:   *Protein Shake Criteria: no more than 210 Calories, at least 20 grams of protein, and less than 10 grams of sugar   [facility] smoothie (160 Calories, 20 g protein)   Premier Protein (160 Calories, 30 g protein)  Slim Fast Advanced Nutrition (180 Calories, 20 g protein)  Muscle Milk, lactose-free, 17 oz bottle (210 Calories, 30 g protein)  Integrated Supplements, no artificial sugars (110 Calories, 20 g protein)  Genepro, unflavored protein powder (60 Calories, 30 g protein)  Boost/Ensure Max (160 calories, 30 gm protein)   [employer] Core Power (170 calories, 26 gm protein)    Meal Replacement Bar Options:  [facility] Protein Shake (160 Calories, 15 g protein)  Quest Protein Bars (190 Calories, 20 g  protein)  Built Bar (170 Calories, 15-20 g protein)  One Protein Bar (210 calories, 20 g protein)  [place] Signature Protein Bar ([employer]) (190 Calories, 21 g protein)  Pure Protein Bars (180 Calories, 21 g protein)      Protein Sources for Weight Loss  [email or web address]     Carbohydrates  [email or web address]     Mindful Eating  [email or web address]     Diet Guidelines after Weight Loss Surgery  [email or web address]     Seated Exercises for Arms and Legs (can be done before or after surgery)  [email or web address]      Follow-Up:  1-2 month, PRN    Time spent with patient: 23 minutes.  Blossom Caal RD, LD      Again, thank you for allowing me to participate in the care of your patient.      Sincerely,    Blossom Caal RD

## 2022-07-01 NOTE — PROGRESS NOTES
"Max Meade is a 41 year old male who is being evaluated via a billable video visit.      The patient has been notified of following:     \"This video visit will be conducted via a call between you and your physician/provider. We have found that certain health care needs can be provided without the need for an in-person physical exam.  This service lets us provide the care you need with a video conversation.  If a prescription is necessary we can send it directly to your pharmacy.  If lab work is needed we can place an order for that and you can then stop by our lab to have the test done at a later time.    Video visits are billed at different rates depending on your insurance coverage.  Please reach out to your insurance provider with any questions.    If during the course of the call the physician/provider feels a video visit is not appropriate, you will not be charged for this service.\"    Patient has given verbal consent for Video visit? Yes  How would you like to obtain your AVS? MyChart  Will anyone else be joining your video visit? No  {If patient encounters technical issues they should call [phone]      Video-Visit Details    Type of service:  Video Visit    Video Start Time: 10:34 AM  Video End Time: 10:57 AM    Originating Location (pt. Location): Home    Distant Location (provider location):  [facility] WEIGHT MANAGEMENT [facility]     Platform used for Video Visit: [employer]    During this virtual visit the patient is located in [place], patient verifies this as the location during the entirety of this visit.     RD explained ABN verbally to patient over virtual visit. Pt verbally selected option 1 on the ABN form which is good for 1 year. JOSEPHINE sent message to [id number] to mail out ABN per protocol. Pt instructed to send ABN back to clinic for scanning and filing.      Weight Management Nutrition Consultation    Max Meade is a 41 year old male presents today for " "return weight management nutrition consultation.  Patient referred by Dr. Hdz on December 27, 2021.    Per surgery PA - Patient interested in weight loss surgery which we can discuss with surgeon when weight loss goal of 100 lbs lost met and patient mobile.    Patient with Co-morbidities of obesity including:  Type II DM yes (insulin dependant, managed inpatient currently)  Renal Failure no  Sleep apnea yes  Hypertension no   Dyslipidemia no  Joint pain yes  Back pain yes  GERD no     Anthropometrics:  He reports checking weight weekly    Current weight (pt report): 584 lbs  (-13 lbs in past 6 weeks, -83 lbs from initial weight of 667 lbs)    Weight history (per pt report):  3/28/22: 631 lbs   2/14/22: 648 lbs   2/23/22: 642 lbs   1/24/22: 648 lbs    Estimated body mass index is 81.19 kg/m  as calculated from the following:    Height as of an earlier encounter on 12/27/21: 1.93 m (6' 4\").    Weight as of an earlier encounter on 12/27/21: 302.5 kg (667 lb).    Per notes from [facility]:  12/13/21 (!) 293.8 kg (647 lb 12.8 oz)   12/08/21 (!) 287.9 kg (634 lb 12.8 oz)   09/27/21 (!) 292.2 kg (644 lb 3.2 oz)     Admitted in March 2021. Notes he started working on weight loss at this time. Weight per care everywhere:   321 kg (707 lb 10.8 oz) 03/18/2021        Medications for Weight Loss:  Ozempic, may be switching to Wegovy   Topiramate    NUTRITION HISTORY  See MD note for details.  Per questionnaire, strong emotional component to eating. Readiness and confidence to change, rated 10 (very ready).    Saw [facility] bariat[facility] RD 6 times, last visit 12/16/21.     Currently admitted (since March 2021) at [facility] in [facility] and working with inpatient RD on weight loss. Had been encouraged to follow 2300 calories/day for last 3-4 months, and had not seen any wt loss, and has actually gained (~20 lbs). Reports may be related to fluid retention.    He is getting 3 meals and 2 snack " per day from the hospital. Reports no outside foods. He does not like hospital cooked veggies or greek yogurt.  Once per week slice of cake and once per day cookies.  Does not tolerate regular milk.     Jan 2022 - Pt reports he has lost 19 lbs since initial visit. He was following 1500 calories/day until 1/11, then started following 1350 calories/day. Repots no outside food from hospital meals and snacks. Rolling and moving better. Is able to stand for a minute which he was not doing before. Doing arm movements while in bed. He is interested in weight loss surgery consult with  [facility].    Feb 2022 -  Pt reports he is doing PT 4 days per week. States he continues to follow the 1350 calories per day. States he has been talking to his cousin more on the phone which is helping distract between meals/snacks. His cousin has been very supportive to him and he wants to be able to move to [place] where his cousin lives. States he is able to stick to the diet plan at the hospital without issues, denies consuming outside food.     April 2022 - He reports this month has been going well. States PT has been on hold, wants him to lose more weight first before walking. Exercise session 30 mins 4 times per week doing seated exercise bike, medicine ball lifting. Has a couch in room where they are doing exercises.  Has a chart in his room to cross off lbs as he is losing, encouraged by this. Has an image of an outfit he wants to fit one day that helps with motivation. States he continues to follow hospital meal plan. Sometimes feeling hungry between dinner and HS snack, but is able to stay distracted - calling family, [employer] videos. Watches baking videos, but states this does not trigger him to want to eat.     May 2022 - Not feeling as hungry before bed with higher protein/calorie pm snack. He c/o constipation, likely r/t high protein diet and unable to ambulate.  Doing exercise M-Th with PT, and some on the weekends on his own. Doing a  seated peddle, medicine ball, weighted bar, leg kicks, marching in place, and dumb bells. Will start practicing walking once he gets to weight 580 lbs.     Today - Notes holidays can be a challenge, felt urge to want to order Chinese take-out on the 4th and then decided it was not worth it. States his long-term wt goal: 220-230 lbs. He is now able to push himself in a wheel chair, and using hospital gym to exercise upper body. Will be able to work on walking in next couple weeks now wt in the 580s.     1500 calories/day (30 gm pro/meal, <60 gm CHO/meal, <15 gm CHO/snack):  Breakfast: protein shake + english muffin + 1 pc sausage + egg   12 pm snack: 1/2 cup fresh fruit bowl (grapes, pineapple, strawberries and melon)  Lunch 2pm: protein shake with watermelon/grapes  4pm snack: carrots sticks - sometimes feeling too full to finish   Dinner: 6 pm 1/2 turkey sandwich (1 pc bread + lettuce + tomato + mustard and light sesay), string cheese, 1/2 banana (or GF cookie on Friday), coke zero and protein shake (fridays cake with dinner)  10 pm: Greek yogurt and honey and string cheese - taking with medication   Beverages: water/crystallite (5-6 x 8 oz/day)    Progress Towards Previous Goals:  1) Follow 1500 calorie/day plan - Met, continues [id number] ronna/day    - Continue to replace 2 meals per day with low-calorie protein shake.  2) Aim for less than 60 gm carb per meal and less than 15 gm carb per snack - Met, continues  3) Use distractions between meals/snacks - calling your cousin, social media, journaling.  - Met, continues  4) Work to maintain consistency with exercise sessions. - Met, continues to       Physical Activity:  Reports doing PT/OT and exercise 4 times per week for 30 mins and doing seated exercises while in bed.     Nutrition Prescription  Recommended energy/nutrient modification.  1500 calories/day (per MD)    Nutrition Diagnosis  Obesity r/t long history of positive energy balance aeb BMI >30. -  improving/continues    Nutrition Intervention  Materials/education provided:  - Reviewed progress towards previous goals  - Reviewed strategy for weight loss - continuing meal plan as established. Encouraged consistency with exercise sessions and starting walking routine as soon as able.  - Praised pt on weight loss this past month and consistency with hypocaloric diet.   - Discussed that he will likely be ready to schedule consult with surgery PA at next RD visit.   Patient demonstrates understanding.    Expected Engagement: good  Follow-Up Plans: calorie goals     Nutrition Goals  1) Follow 1500 calorie/day plan   - Continue to replace 2 meals per day with low-calorie protein shake.  2) Aim for less than 60 gm carb per meal and less than 15 gm carb per snack  3) Use distractions between meals/snacks - calling your cousin, social media, journaling.   4) Work to maintain consistency with exercise sessions. Start walking routine with PT.     Meal Replacement Shake Options:   *Protein Shake Criteria: no more than 210 Calories, at least 20 grams of protein, and less than 10 grams of sugar   [facility] smoothie (160 Calories, 20 g protein)   Premier Protein (160 Calories, 30 g protein)  Slim Fast Advanced Nutrition (180 Calories, 20 g protein)  Muscle Milk, lactose-free, 17 oz bottle (210 Calories, 30 g protein)  Integrated Supplements, no artificial sugars (110 Calories, 20 g protein)  Genepro, unflavored protein powder (60 Calories, 30 g protein)  Boost/Ensure Max (160 calories, 30 gm protein)   [employer] Core Power (170 calories, 26 gm protein)    Meal Replacement Bar Options:  [facility] Protein Shake (160 Calories, 15 g protein)  Quest Protein Bars (190 Calories, 20 g protein)  Built Bar (170 Calories, 15-20 g protein)  One Protein Bar (210 calories, 20 g protein)  [place] Signature Protein Bar ([employer]) (190 Calories, 21 g protein)  Pure Protein Bars (180 Calories, 21 g protein)      Protein Sources for Weight  Loss  [email or web address]     Carbohydrates  [email or web address]     Mindful Eating  [email or web address]     Diet Guidelines after Weight Loss Surgery  [email or web address]     Seated Exercises for Arms and Legs (can be done before or after surgery)  [email or web address]      Follow-Up:  1-2 month, PRN    Time spent with patient: 23 minutes.  Blossom Caal, JOSEPHINE, LD

## 2022-07-05 ENCOUNTER — VIRTUAL VISIT (OUTPATIENT)
Dept: ENDOCRINOLOGY | Facility: CLINIC | Age: 42
End: 2022-07-05
Payer: MEDICARE

## 2022-07-05 DIAGNOSIS — E66.9 OBESITY: ICD-10-CM

## 2022-07-05 DIAGNOSIS — E11.9 TYPE 2 DIABETES MELLITUS WITHOUT COMPLICATION, WITHOUT LONG-TERM CURRENT USE OF INSULIN (H): ICD-10-CM

## 2022-07-05 DIAGNOSIS — Z71.3 NUTRITIONAL COUNSELING: Primary | ICD-10-CM

## 2022-07-05 PROCEDURE — 97803 MED NUTRITION INDIV SUBSEQ: CPT | Mod: GA | Performed by: DIETITIAN, REGISTERED

## 2022-07-05 NOTE — LETTER
Date:July 5, 2022      Patient was self referred, no letter generated. Do not send.        [facility] Health Information

## 2022-07-05 NOTE — PATIENT INSTRUCTIONS
Joe Santana,    Follow-up with RD in 8 weeks.     Thank you,    Blossom Caal, RD, LD  If you would like to schedule or reschedule an appointment with the RD, please call [phone]    Nutrition Goals  1) Follow 1500 calorie/day plan   - Continue to replace 2 meals per day with low-calorie protein shake.  2) Aim for less than 60 gm carb per meal and less than 15 gm carb per snack  3) Use distractions between meals/snacks - calling your cousin, social media, journaling.   4) Work to maintain consistency with exercise sessions. Start walking routine with PT.     Meal Replacement Shake Options:   *Protein Shake Criteria: no more than 210 Calories, at least 20 grams of protein, and less than 10 grams of sugar   [facility] smoothie (160 Calories, 20 g protein)   Premier Protein (160 Calories, 30 g protein)  Slim Fast Advanced Nutrition (180 Calories, 20 g protein)  Muscle Milk, lactose-free, 17 oz bottle (210 Calories, 30 g protein)  Integrated Supplements, no artificial sugars (110 Calories, 20 g protein)  Genepro, unflavored protein powder (60 Calories, 30 g protein)  Boost/Ensure Max (160 calories, 30 gm protein)   [employer] Core Power (170 calories, 26 gm protein)    Meal Replacement Bar Options:  [facility] Protein Shake (160 Calories, 15 g protein)  Quest Protein Bars (190 Calories, 20 g protein)  Built Bar (170 Calories, 15-20 g protein)  One Protein Bar (210 calories, 20 g protein)  [place] Signature Protein Bar ([employer]) (190 Calories, 21 g protein)  Pure Protein Bars (180 Calories, 21 g protein)      Protein Sources for Weight Loss  [email or web address]     Carbohydrates  [email or web address]     Mindful Eating  [email or web address]     Diet Guidelines after Weight Loss Surgery  [email or web address]     Seated Exercises for Arms and Legs (can be done before or after surgery)  [email or web address]    Interested in working with a health ?  Health coaches  work with you to improve your overall health and wellbeing.  They look at the whole person, and may involve discussion of different areas of life, including, but not limited to the four pillars of health (sleep, exercise, nutrition, and stress management). Discuss with your care team if you would like to start working a health .    Health Coaching-3 Pack:    $99 for three health coaching visits    Visits may be done in person or via phone    Coaching is a partnership between the  and the client; Coaches do not prescribe or diagnose    Coaching helps inspire the client to reach his/her personal goals      COMPREHENSIVE WEIGHT MANAGEMENT PROGRAM  VIRTUAL SUPPORT GROUPS    For Support Group Information:      We offer support groups for patients who are working on weight loss and considering, preparing for or have had weight loss surgery.   There is no cost for this opportunity.  You are invited to attend the?Virtual Support Groups?provided by any of the following locations:    [facility] via Microsoft Teams with Sarah Ceja RN  2.   [place] via [employer] with Obed Davenport, PhD, LP  3.   [place] via [employer] with Gabi Winchester RN  4.   [facility] via Microsoft Teams with Gabi Walters [facility]    The following Support Group information can also be found on our website:  [email or web address]      [facility] Weight Loss Surgery Support Group    [facility] Weight Loss Surgery Support Group  The support group is a patient-lead forum that meets monthly to share experiences, encouragement and education. It is open to those who have had weight loss surgery, are scheduled for surgery, and those who are considering surgery.   WHEN: This group meets on the 3rd Wednesday of each month from 5:00PM - 6:00PM virtually using Microsoft Teams.   FACILITATOR: Led by Sarah Ceja, RD, LD, RN, the program's Clinical  "Coordinator.   TO REGISTER: Please contact the clinic via Z[employer] or call the nurse line directly at [phone] to inform our staff that you would like an invite sent to you and to let us know the email you would like the invite sent to. Prior to the meeting, a link with directions on how to join the meeting will be sent to you.    2022 Meetings  January 19: \"Let's Talk\" a time for the group to share.  February 16: \"Let's Talk\" a time for the group to share.  March 16: Guest Speakers: Psychologists, Rosina Polanco, PhD,LP and Katrin Marie PsyD,LP  April 20: Guest Speaker: Health , Jacquelin Churchill, [facility],CHES, [facility]  May 18: Guest Speaker: DietitianZay, RD, LP  Geraldine 15: \"Let's Talk\" a time for the group to share.  July 20: \"Let's Talk\" a time for the group to share.  August 17: TBA  September 21: TBA  October 19: Guest Speaker: Dr Geovany Amezcua MD Pulmonologist and Sleep Medicine Physician, \"Getting a Good Night's Sleep\".  November 16: TBA  December 21: TBA    [facility] and [facility] Support Groups    Connections: Bariatric Care Support Group?  This is open to all [facility] (and those external to this program) pre- and post- operative bariatric surgery patients as well as their support system.   WHEN: This group meets the 2nd Tuesday of each month from 6:30 PM - 8:00 PM virtually using Microsoft Teams.   FACILITATOR: Led by Obed Davenport, Ph.D who is a Licensed Psychologist with the [facility] Comprehensive Weight Management Program.   TO REGISTER: Please send an email to Obed Davenport, Ph.D., LP at?[email or web address]?if you would like an invitation to the group and to learn about using Microsoft Teams.    2022 Meetings  January 11: Rupa Melgar, PharmD, Pharmacy Resident at [facility], \"Medications and Bariatric Surgery\".  February 8: Open Forum  March 8  April 12  May 10  Geraldine 14    Connections: Post-Operative Bariatric Surgery Support " Group  This is a support group for [facility] bariatric patients (and those external to [facility]) who have had bariatric surgery and are at least 3 months post-surgery.  WHEN: This support group meets the 4th Wednesday of the month from 11:00 AM - 12:00 PM virtually using Microsoft Teams.   FACILITATOR: Led by Certified Bariatric Nurse, Gabi Winchester RN.   TO REGISTER: Please send an email to Gabi at [email or web address] if you would like an invitation to the group and to learn about using Microsoft Teams.    2022 Meetings  January 26  February 23  March 23  April 27  May 25  Geraldine 22    [facility] Healthy Lifestyle Virtual Support Group    Healthy Lifestyle Virtual Support Group?  This is 60 minutes of small group guided discussion, support and resources. All are welcome who want a healthy lifestyle.  WHEN: This group meets monthly on a Friday from 12:30 PM - 1:30 PM virtually using Microsoft Teams.   FACILITATOR: Led by National Board Certified Health and , Gabi Walters [facility].   TO REGISTER: Please send an email to Gabi [email or web address] to receive monthly invites to the group or if you have any questions about having a health .  Prior to the meeting, a link with directions on how to join the meeting will be sent to you.    2022 Meetings  MAY 20: OPEN FORUM  JUNE: 24:  Setting Limits and BoundariesGabi  July 29: OPEN FORUM  August 26: Special Guest Registered Dietician Yola De La Fuente  Sept 30: OPEN FORUM  Oct 28, GraDorcas Hsu National Board Certified Health   Nov 18: Navigating How to Eat Around the Holidays with JOSEPHINE Hu  Dec 16: Changing your relationship with movement with  Jacquelin National Board Certified Health

## 2022-07-05 NOTE — LETTER
"7/5/2022       RE: Max Meade  [address]  [place] [address]     Dear Colleague,    Thank you for referring your patient, Max Meade, to the [facility] WEIGHT MANAGEMENT CLINIC [place] at [facility]. Please see a copy of my visit note below.    Max Meade is a 41 year old male who is being evaluated via a billable video visit.      The patient has been notified of following:     \"This video visit will be conducted via a call between you and your physician/provider. We have found that certain health care needs can be provided without the need for an in-person physical exam.  This service lets us provide the care you need with a video conversation.  If a prescription is necessary we can send it directly to your pharmacy.  If lab work is needed we can place an order for that and you can then stop by our lab to have the test done at a later time.    Video visits are billed at different rates depending on your insurance coverage.  Please reach out to your insurance provider with any questions.    If during the course of the call the physician/provider feels a video visit is not appropriate, you will not be charged for this service.\"    Patient has given verbal consent for Video visit? Yes  How would you like to obtain your AVS? MyChart  Will anyone else be joining your video visit? No  {If patient encounters technical issues they should call [phone]      Video-Visit Details    Type of service:  Video Visit    Video Start Time: 10:34 AM  Video End Time: 10:57 AM    Originating Location (pt. Location): Home    Distant Location (provider location):  [facility] WEIGHT MANAGEMENT CLINIC [place]     Platform used for Video Visit: [facility]    During this virtual visit the patient is located in [place], patient verifies this as the location during the entirety of this visit.     RD explained ABN verbally to patient " "over virtual visit. Pt verbally selected option 1 on the ABN form which is good for 1 year. RD sent message to P [id number] to mail out ABN per protocol. Pt instructed to send ABN back to clinic for scanning and filing.      Weight Management Nutrition Consultation    Max Meade is a 41 year old male presents today for return weight management nutrition consultation.  Patient referred by Dr. Hdz on December 27, 2021.    Per surgery PA - Patient interested in weight loss surgery which we can discuss with surgeon when weight loss goal of 100 lbs lost met and patient mobile.    Patient with Co-morbidities of obesity including:  Type II DM yes (insulin dependant, managed inpatient currently)  Renal Failure no  Sleep apnea yes  Hypertension no   Dyslipidemia no  Joint pain yes  Back pain yes  GERD no     Anthropometrics:  He reports checking weight weekly    Current weight (pt report): 584 lbs  (-13 lbs in past 6 weeks, -83 lbs from initial weight of 667 lbs)    Weight history (per pt report):  3/28/22: 631 lbs   2/14/22: 648 lbs   2/23/22: 642 lbs   1/24/22: 648 lbs    Estimated body mass index is 81.19 kg/m  as calculated from the following:    Height as of an earlier encounter on 12/27/21: 1.93 m (6' 4\").    Weight as of an earlier encounter on 12/27/21: 302.5 kg (667 lb).    Per notes from  Bariatric RD:  12/13/21 (!) 293.8 kg (647 lb 12.8 oz)   12/08/21 (!) 287.9 kg (634 lb 12.8 oz)   09/27/21 (!) 292.2 kg (644 lb 3.2 oz)     Admitted in March 2021. Notes he started working on weight loss at this time. Weight per care everywhere:   321 kg (707 lb 10.8 oz) 03/18/2021        Medications for Weight Loss:  Ozempic, may be switching to Wegovy   Topiramate    NUTRITION HISTORY  See MD note for details.  Per questionnaire, strong emotional component to eating. Readiness and confidence to change, rated 10 (very ready).    Saw  bariatric RD 6 times, last visit " 12/16/21.     Currently admitted (since March 2021) at [facility] in [facility] and working with inpatient RD on weight loss. Had been encouraged to follow 2300 calories/day for last 3-4 months, and had not seen any wt loss, and has actually gained (~20 lbs). Reports may be related to fluid retention.    He is getting 3 meals and 2 snack per day from the hospital. Reports no outside foods. He does not like hospital cooked veggies or greek yogurt.  Once per week slice of cake and once per day cookies.  Does not tolerate regular milk.     Jan 2022 - Pt reports he has lost 19 lbs since initial visit. He was following 1500 calories/day until 1/11, then started following 1350 calories/day. Repots no outside food from hospital meals and snacks. Rolling and moving better. Is able to stand for a minute which he was not doing before. Doing arm movements while in bed. He is interested in weight loss surgery consult with [facility].    Feb 2022 -  Pt reports he is doing PT 4 days per week. States he continues to follow the 1350 calories per day. [facility] he has been talking to his cousin more on the phone which is helping distract between meals/snacks. His cousin has been very supportive to him and he wants to be able to move to [place] where his cousin lives. States he is able to stick to the diet plan at the hospital without issues, denies consuming outside food.     April 2022 - He reports this month has been going well. States PT has been on hold, wants him to lose more weight first before walking. Exercise session 30 mins 4 times per week doing seated exercise bike, medicine ball lifting. Has a couch in room where they are doing exercises.  Has a chart in his room to cross off lbs as he is losing, encouraged by this. Has an image of an outfit he wants to fit one day that helps with motivation. States he continues to follow hospital meal plan. Sometimes feeling hungry between dinner and HS snack, but is able to stay  distracted - calling family, [employer] videos. Watches baking videos, but states this does not trigger him to want to eat.     May 2022 - Not feeling as hungry before bed with higher protein/calorie pm snack. He c/o constipation, likely r/t high protein diet and unable to ambulate.  Doing exercise M-Th with PT, and some on the weekends on his own. Doing a seated peddle, medicine ball, weighted bar, leg kicks, marching in place, and dumb bells. Will start practicing walking once he gets to weight 580 lbs.     Today - Notes holidays can be a challenge, felt urge to want to order Chinese take-out on the 4th and then decided it was not worth it. States his long-term wt goal: 220-230 lbs. He is now able to push himself in a wheel chair, and using hospital gym to exercise upper body. Will be able to work on walking in next couple weeks now wt in the 580s.     1500 calories/day (30 gm pro/meal, <60 gm CHO/meal, <15 gm CHO/snack):  Breakfast: protein shake + english muffin + 1 pc sausage + egg   12 pm snack: 1/2 cup fresh fruit bowl (grapes, pineapple, strawberries and melon)  Lunch 2pm: protein shake with watermelon/grapes  4pm snack: carrots sticks - sometimes feeling too full to finish   Dinner: 6 pm 1/2 turkey sandwich (1 pc bread + lettuce + tomato + mustard and light sesay), string cheese, 1/2 banana (or GF cookie on Friday), coke zero and protein shake (fridays cake with dinner)  10 pm: Greek yogurt and honey and string cheese - taking with medication   Beverages: water/crystallite (5-6 x 8 oz/day)    Progress Towards Previous Goals:  1) Follow 1500 calorie/day plan - Met, continues [id number] ronna/day    - Continue to replace 2 meals per day with low-calorie protein shake.  2) Aim for less than 60 gm carb per meal and less than 15 gm carb per snack - Met, continues  3) Use distractions between meals/snacks - calling your cousin, social media, journaling.  - Met, continues  4) Work to maintain consistency with exercise  sessions. - Met, continues to       Physical Activity:  Reports doing PT/OT and exercise 4 times per week for 30 mins and doing seated exercises while in bed.     Nutrition Prescription  Recommended energy/nutrient modification.  1500 calories/day (per MD)    Nutrition Diagnosis  Obesity r/t long history of positive energy balance aeb BMI >30. - improving/continues    Nutrition Intervention  Materials/education provided:  - Reviewed progress towards previous goals  - Reviewed strategy for weight loss - continuing meal plan as established. Encouraged consistency with exercise sessions and starting walking routine as soon as able.  - Praised pt on weight loss this past month and consistency with hypocaloric diet.   - Discussed that he will likely be ready to schedule consult with surgery PA at next RD visit.   Patient demonstrates understanding.    Expected Engagement: good  Follow-Up Plans: calorie goals     Nutrition Goals  1) Follow 1500 calorie/day plan   - Continue to replace 2 meals per day with low-calorie protein shake.  2) Aim for less than 60 gm carb per meal and less than 15 gm carb per snack  3) Use distractions between meals/snacks - calling your cousin, social media, journaling.   4) Work to maintain consistency with exercise sessions. Start walking routine with PT.     Meal Replacement Shake Options:   *Protein Shake Criteria: no more than 210 Calories, at least 20 grams of protein, and less than 10 grams of sugar   [facility] smoothie (160 Calories, 20 g protein)   Premier Protein (160 Calories, 30 g protein)  Slim Fast Advanced Nutrition (180 Calories, 20 g protein)  Muscle Milk, lactose-free, 17 oz bottle (210 Calories, 30 g protein)  Integrated Supplements, no artificial sugars (110 Calories, 20 g protein)  Genepro, unflavored protein powder (60 Calories, 30 g protein)  Boost/Ensure Max (160 calories, 30 gm protein)   [employer] Core Power (170 calories, 26 gm protein)    Meal Replacement Bar  Options:  [facility] Protein Shake (160 Calories, 15 g protein)  Quest Protein Bars (190 Calories, 20 g protein)  Built Bar (170 Calories, 15-20 g protein)  One Protein Bar (210 calories, 20 g protein)  [place] Signature Protein Bar ([employer]) (190 Calories, 21 g protein)  Pure Protein Bars (180 Calories, 21 g protein)      Protein Sources for Weight Loss  [email or web address]     Carbohydrates  [email or web address]     Mindful Eating  [email or web address]     Diet Guidelines after Weight Loss Surgery  [email or web address]     Seated Exercises for Arms and Legs (can be done before or after surgery)  [email or web address]      Follow-Up:  1-2 month, PRN    Time spent with patient: 23 minutes.  Blossom Caal RD, LD      Again, thank you for allowing me to participate in the care of your patient.      Sincerely,    Blossom Caal RD

## 2022-08-01 ENCOUNTER — VIRTUAL VISIT (OUTPATIENT)
Dept: ENDOCRINOLOGY | Facility: CLINIC | Age: 42
End: 2022-08-01
Payer: MEDICARE

## 2022-08-01 VITALS — BODY MASS INDEX: 38.36 KG/M2 | HEIGHT: 76 IN | WEIGHT: 315 LBS

## 2022-08-01 DIAGNOSIS — E66.01 MORBID OBESITY (H): Primary | ICD-10-CM

## 2022-08-01 PROCEDURE — 99213 OFFICE O/P EST LOW 20 MIN: CPT | Mod: 95 | Performed by: INTERNAL MEDICINE

## 2022-08-01 ASSESSMENT — PAIN SCALES - GENERAL: PAINLEVEL: SEVERE PAIN (6)

## 2022-08-01 NOTE — PROGRESS NOTES
Max is a 42 year old who is being evaluated via a billable video visit.      How would you like to obtain your AVS? MyChart  If the video visit is dropped, the invitation should be resent by: Text to cell phone: [phone]  Will anyone else be joining your video visit? No     Video-Visit Details    Type of service:  Video Visit    Start: 08/01/2022 11:48 am  Stop: 08/01/2022 11:56 am    Originating Location (pt. Location): Home    Distant Location (provider location):  [facility] WEIGHT MANAGEMENT CLINIC [place]     Platform used for Video Visit: [employer]PAL

## 2022-08-01 NOTE — LETTER
"2022       RE: Max Meade  [address]  [place] [address]     Dear Colleague,    Thank you for referring your patient, Max Meade, to the [facility] WEIGHT MANAGEMENT CLINIC [place] at [facility]. Please see a copy of my visit note below.    Max is a 42 year old who is being evaluated via a billable video visit.      How would you like to obtain your AVS? MyChart  If the video visit is dropped, the invitation should be resent by: Text to cell phone: [phone]  Will anyone else be joining your video visit? No     Video-Visit Details    Type of service:  Video Visit    Start: 2022 11:48 am  Stop: 2022 11:56 am    Originating Location (pt. Location): Home    Distant Location (provider location):  [facility] WE[facility] MANAGEMENT CLINIC [place]     Platform used for Video Visit: [employer]          [facility] Medical Weight Management Note     Max Meade  MRN:  [id number]  :  1980  JOSEFINA:  22    Dear Colleagues,    I had the pleasure of seeing your patient Max Meade.  He is a 41 year old male who I am continuing to see for treatment of obesity related to:     2021   I have the following health issues associated with obesity: Type II Diabetes, Sleep Apnea   I have the following symptoms associated with obesity: Knee Pain, Depression, Lower Extremity Swelling, Back Pain, Fatigue     CURRENT WEIGHT:   577 lbs 0 oz Reports 634    Wt Readings from Last 4 Encounters:   22 (!) 261.7 kg (577 lb)   22 (!) 295.7 kg (652 lb)   01/10/22 (!) 297.6 kg (656 lb)   21 (!) 302.5 kg (667 lb)     Height:  6' 4\"  Body Mass Index:  Body mass index is 70.23 kg/m .  Vitals:  B/P: Data Unavailable, P: Data Unavailable    Initial consult weight was 667 on down 11.  Weight change since last seen on 1/10/2022 is down 18 pounds.   Total loss is 15 pounds.    INTERVAL HISTORY:  Says " now following 1300 calories diet in hospital and denies any outside food. Now semaglutide, now at 2 mg/week. Topiramate is 25 mg/d.     Diet Recall Review with Patient 12/21/2021   Do you typically eat breakfast? Yes   If you do eat breakfast, what types of food do you eat? English toast,butler, sausage, eggs   Do you typically eat lunch? Yes   If you do eat lunch, what types of food do you typically eat?  Tuna sandwich   Do you typically eat supper? Yes   If you do eat supper, what types of food do you typically eat? Chicken [place]   Do you typically eat snacks? No   Do you like vegetables?  No   Do you drink water? Yes   How many glasses of juice do you drink in a typical day? 0   How many of glasses of milk do you drink in a typical day? 0   If you do drink milk, what type? N/A   How many 8oz glasses of sugar containing drinks such as Simba-Aid/sweet tea do you drink in a day? 0   How many cans/bottles of sugar pop/soda/tea/sports drinks do you drink in a day? 0   How many cans/bottles of diet pop/soda/tea or sports drink do you drink in a day? 7   How often do you have a drink of alcohol? Never     MEDICATIONS:   Current Outpatient Medications   Medication     albuterol (PROVENTIL) (2.5 MG/3ML) 0.083% neb solution     apixaban ANTICOAGULANT (ELIQUIS) 2.5 MG tablet     atorvastatin (LIPITOR) 10 MG tablet     bumetanide (BUMEX) 1 MG tablet     busPIRone (BUSPAR) 10 MG tablet     cholecalciferol 25 MCG (1000 UT) TABS     cyclobenzaprine (FLEXERIL) 5 MG tablet     DULoxetine (CYMBALTA) 60 MG capsule     famotidine (PEPCID) 20 MG tablet     gabapentin (NEURONTIN) 300 MG capsule     hydrOXYzine (VISTARIL) 25 MG capsule     insulin aspart (NOVOLOG VIAL) 100 UNITS/ML vial     insulin glargine (LANTUS VIAL) 100 UNIT/ML vial     insulin lispro (HUMALOG VIAL) 100 UNIT/ML vial     lactulose (CHRONULAC) 10 GM/15ML solution     Magnesium Oxide 500 MG TABS     melatonin 3 MG tablet     mineral oil-hydrophilic petrolatum  (AQUAPHOR) external ointment     Multiple Vitamins-Minerals (MULTI VITAMIN  /MINERALS) TABS     naloxone (NARCAN) 4 MG/0.1ML nasal spray     pantoprazole (PROTONIX) 40 MG EC tablet     senna-docusate (SENOKOT-S/PERICOLACE) 8.6-50 MG tablet     topiramate (TOPAMAX) 25 MG tablet     zolpidem (AMBIEN) 5 MG tablet     acetaminophen (TYLENOL) 500 MG tablet     No current facility-administered medications for this visit.     Weight Loss Medication History Reviewed With Patient 1/31/2022   Which weight loss medications are you currently taking on a regular basis?  Topamax (topiramate)   Are you having any side effects from the weight loss medication that we have prescribed you? No     ASSESSMENT:   Maintain 1350 calories diet in hospital and semaglutide 2 mg/week and maintain topiramate 75 BID. Will again discuss barisurgery with surgery colleagues, but likely will need more weight loss and achieve ambulation before can consider surgery.    FOLLOW-UP:    12 weeks.    Sincerely,  Erlin Hdz MD      Again, thank you for allowing me to participate in the care of your patient.      Sincerely,    Erlin Hdz MD

## 2022-08-01 NOTE — PROGRESS NOTES
"    Return Medical Weight Management Note     Max Meade  MRN:  [id number]  :  1980  JOSEFINA:  20    Dear Colleagues,    I had the pleasure of seeing your patient Max Meade.  He is a 41 year old male who I am continuing to see for treatment of obesity related to:     2021   I have the following health issues associated with obesity: Type II Diabetes, Sleep Apnea   I have the following symptoms associated with obesity: Knee Pain, Depression, Lower Extremity Swelling, Back Pain, Fatigue     CURRENT WEIGHT:   577 lbs 0 oz Reports 634    Wt Readings from Last 4 Encounters:   22 (!) 261.7 kg (577 lb)   22 (!) 295.7 kg (652 lb)   01/10/22 (!) 297.6 kg (656 lb)   21 (!) 302.5 kg (667 lb)     Height:  6' 4\"  Body Mass Index:  Body mass index is 70.23 kg/m .  Vitals:  B/P: Data Unavailable, P: Data Unavailable    Initial consult weight was 667 on down 11.  Weight change since last seen on 1/10/2022 is down 18 pounds.   Total loss is 15 pounds.    INTERVAL HISTORY:  Says now following 1300 calories diet in hospital and denies any outside food. Now semaglutide, now at 2 mg/week. Topiramate is 25 mg/d.     Diet Recall Review with Patient 2021   Do you typically eat breakfast? Yes   If you do eat breakfast, what types of food do you eat? [place]ron[place] toast,butler, sausage, eggs   Do you typically eat lunch? Yes   If you do eat lunch, what types of food do you typically eat?  Tuna sandwich   Do you typically eat supper? Yes   If you do eat supper, what types of food do you typically eat? Chicken [place]   Do you typically eat snacks? No   Do you like vegetables?  No   Do you drink water? Yes   How many glasses of juice do you drink in a typical day? 0   How many of glasses of milk do you drink in a typical day? 0   If you do drink milk, what type? N/A   How many 8oz glasses of sugar containing drinks such as Simba-Aid/sweet tea do you drink in a day? 0   How many cans/bottles of sugar " pop/soda/tea/sports drinks do you drink in a day? 0   How many cans/bottles of diet pop/soda/tea or sports drink do you drink in a day? 7   How often do you have a drink of alcohol? Never     MEDICATIONS:   Current Outpatient Medications   Medication     albuterol (PROVENTIL) (2.5 MG/3ML) 0.083% neb solution     apixaban ANTICOAGULANT (ELIQUIS) 2.5 MG tablet     atorvastatin (LIPITOR) 10 MG tablet     bumetanide (BUMEX) 1 MG tablet     busPIRone (BUSPAR) 10 MG tablet     cholecalciferol 25 MCG (1000 UT) TABS     cyclobenzaprine (FLEXERIL) 5 MG tablet     DULoxetine (CYMBALTA) 60 MG capsule     famotidine (PEPCID) 20 MG tablet     gabapentin (NEURONTIN) 300 MG capsule     hydrOXYzine (VISTARIL) 25 MG capsule     insulin aspart (NOVOLOG VIAL) 100 UNITS/ML vial     insulin glargine (LANTUS VIAL) 100 UNIT/ML vial     insulin lispro (HUMALOG VIAL) 100 UNIT/ML vial     lactulose (CHRONULAC) 10 GM/15ML solution     Magnesium Oxide 500 MG TABS     melatonin 3 MG tablet     mineral oil-hydrophilic petrolatum (AQUAPHOR) external ointment     Multiple Vitamins-Minerals (MULTI VITAMIN  /MINERALS) TABS     naloxone (NARCAN) 4 MG/0.1ML nasal spray     pantoprazole (PROTONIX) 40 MG EC tablet     senna-docusate (SENOKOT-S/PERICOLACE) 8.6-50 MG tablet     topiramate (TOPAMAX) 25 MG tablet     zolpidem (AMBIEN) 5 MG tablet     acetaminophen (TYLENOL) 500 MG tablet     No current facility-administered medications for this visit.     Weight Loss Medication History Reviewed With Patient 1/31/2022   Which weight loss medications are you currently taking on a regular basis?  Topamax (topiramate)   Are you having any side effects from the weight loss medication that we have prescribed you? No     ASSESSMENT:   Maintain 1350 calories diet in hospital and semaglutide 2 mg/week and maintain topiramate 75 BID. Will again discuss barisurgery with surgery colleagues, but likely will need more weight loss and achieve ambulation before can  consider surgery.    FOLLOW-UP:    12 weeks.    Sincerely,  Erlin Hdz MD

## 2022-08-01 NOTE — NURSING NOTE
"Chief Complaint   Patient presents with     Follow Up     Max, is participating in a virtual visit today for a follow up, no new concerns at this time, as reported by patient.       Vitals:    08/01/22 1120   Weight: (!) 261.7 kg (577 lb)   Height: 1.93 m (6' 4\")       Body mass index is 70.23 kg/m .      Sudha Hoover LPN    "

## 2022-08-01 NOTE — LETTER
Date:August 1, 2022      Provider requested that no letter be sent. Do not send.       [facility]

## 2022-08-26 NOTE — PROGRESS NOTES
"Max Meade is a 41 year old male who is being evaluated via a billable video visit.      The patient has been notified of following:     \"This video visit will be conducted via a call between you and your physician/provider. We have found that certain health care needs can be provided without the need for an in-person physical exam.  This service lets us provide the care you need with a video conversation.  If a prescription is necessary we can send it directly to your pharmacy.  If lab work is needed we can place an order for that and you can then stop by our lab to have the test done at a later time.    Video visits are billed at different rates depending on your insurance coverage.  Please reach out to your insurance provider with any questions.    If during the course of the call the physician/provider feels a video visit is not appropriate, you will not be charged for this service.\"    Patient has given verbal consent for Video visit? Yes  How would you like to obtain your AVS? MyChart  Will anyone else be joining your video visit? No  {If patient encounters technical issues they should call [phone]      Video-Visit Details    Type of service:  Video Visit    Video Start Time: 10:30 AM  Video End Time: 10:52 AM    Originating Location (pt. Location): Home    Distant Location (provider location):  [facility] WEIGHT MANAGEMENT [facility]     Platform used for Video Visit: Hatsize    During this virtual visit the patient is located in [place], patient verifies this as the location during the entirety of this visit.     Pt signed Medicare ABN form which is good for 1 year (8/29/22 - 8/29/23). Faxed to clinic 8/29/22.      Weight Management Nutrition Consultation    Max Meade is a 41 year old male presents today for return weight management nutrition consultation.  Patient referred by Dr. Hdz on December 27, 2021.    Per surgery PA - Patient interested in weight loss surgery which " "we can discuss with surgeon when weight loss goal of 100 lbs lost met and patient mobile.    Patient with Co-morbidities of obesity including:  Type II DM yes (insulin dependant, managed inpatient currently)  Renal Failure no  Sleep apnea yes  Hypertension no   Dyslipidemia no  Joint pain yes  Back pain yes  GERD no     Anthropometrics:  He reports checking weight weekly    Current weight (pt report): 573 lbs (-11 lbs in past 8 weeks, -94 lbs from initial weight of 667 lbs)    Weight history (per pt report):  3/28/22: 631 lbs   2/14/22: 648 lbs   2/23/22: 642 lbs   1/24/22: 648 lbs    Estimated body mass index is 81.19 kg/m  as calculated from the following:    Height as of an earlier encounter on 12/27/21: 1.93 m (6' 4\").    Weight as of an earlier encounter on 12/27/21: 302.5 kg (667 lb).    Per notes from [facility] Bariat[facility] RD:  12/13/21 (!) 293.8 kg (647 lb 12.8 oz)   12/08/21 (!) 287.9 kg (634 lb 12.8 oz)   09/27/21 (!) 292.2 kg (644 lb 3.2 oz)     Admitted in March 2021. Notes he started working on weight loss at this time. Weight per care everywhere:   321 kg (707 lb 10.8 oz) 03/18/2021        Medications for Weight Loss:  Ozempic, may be switching to Wegovy   Topiramate    NUTRITION HISTORY  See MD note for details.  Per questionnaire, strong emotional component to eating. Readiness and confidence to change, rated 10 (very ready).    Saw [facility] bariatric RD 6 times, last visit 12/16/21.     Currently admitted (since March 2021) at [facility] in [facility] and working with inpatient RD on weight loss.     He is getting 3 meals and 2 snack per day from the hospital. Reports no outside foods.   Does not tolerate regular milk.     Jan 2022 -  He was following 1500 calories/day until 1/11, then started following 1350 calories/day. Repots no outside food from hospital meals and snacks. Rolling and moving better. Is able to stand for a minute which he was not doing before. Doing arm " movements while in bed. He is interested in weight loss surgery consult with [facility].    Feb 2022 -  Pt reports he is doing PT 4 days per week. States he continues to follow the 1350 calories per day. States he has been talking to his cousin more on the phone which is helping distract between meals/snacks. His cousin has been very supportive to him and he wants to be able to move to [place] where his cousin lives. States he is able to stick to the diet plan at the hospital without issues, denies consuming outside food.     April 2022 - He reports this month has been going well. States PT has been on hold, wants him to lose more weight first before walking. Exercise session 30 mins 4 times per week doing seated exercise bike, medicine ball lifting. Has a couch in room where they are doing exercises.  Has a chart in his room to cross off lbs as he is losing, encouraged by this. Has an image of an outfit he wants to fit one day that helps with motivation.     May 2022 - Not feeling as hungry before bed with higher protein/calorie pm snack. He c/o constipation, likely r/t high protein diet and unable to ambulate.  Doing exercise M-Th with PT, and some on the weekends on his own. Doing a seated peddle, medicine ball, weighted bar, leg kicks, marching in place, and dumb bells.     July 2022 - Notes holidays can be a challenge, felt urge to want to order Chinese take-out on the 4th and then decided it was not worth it. States his long-term wt goal: 220-230 lbs. He is now able to push himself in a wheel chair, and using hospital gym to exercise upper body. Will be able to work on walking in next couple weeks now wt in the 580s.     Today - Doing stand to sits, side steps, and other leg strengthening activities, progressing well. Feels like he may be able to start walking this week. Weight continues to trend down. Keeping to 1400 ronna/day diet. May feel nauseous on tues/wed, following the weekly Ozempic injections. Taking zofran,  sprite zero, crackers as needed to cope with nausea. Feels like he forces himself to eat at times.     1400 calories/day (30 gm pro/meal, <60 gm CHO/meal, <15 gm CHO/snack):  Breakfast: protein shake + english muffin + 1 pc sausage + egg   12 pm snack: 1/2 cup fresh fruit bowl (grapes, pineapple, strawberries and melon)  Lunch 2pm: protein shake with watermelon/grapes  4pm snack: 70 ronna popcorn  Dinner: 6 pm Grilled cheese and ham and soup, string cheese, 1/2 banana (or GF cookie on Friday), coke zero and protein shake (fridays cake with dinner)  10 pm: Greek yogurt and honey and string cheese - taking with medication   Beverages: water/crystal lite (5-6 x 8 oz/day), one coke zero daily.    Progress Towards Previous Goals:  1) Follow 1500 calorie/day plan - Met, continues.    - Continue to replace 2 meals per day with low-calorie protein shake.  2) Aim for less than 60 gm carb per meal and less than 15 gm carb per snack - Met, continues  3) Use distractions between meals/snacks - calling your cousin, social media, journaling. - Met, continues  4) Work to maintain consistency with exercise sessions. Start walking routine with PT. - Met, continues    Physical Activity:  PT/OT and exercise 4 times per week for 30 mins and doing seated and now standing exercises.    Nutrition Prescription  Recommended energy/nutrient modification.  1500 calories/day or less (per MD)    Nutrition Diagnosis  Obesity r/t long history of positive energy balance aeb BMI >30. - improving/continues    Nutrition Intervention  Materials/education provided:  - Reviewed progress towards previous goals  - Reviewed strategy for weight loss - continuing meal plan as established. Encouraged consistency with exercise sessions and starting walking routine as soon as able.  - Praised pt on weight loss this past month and consistency with hypocaloric diet.   - Discussed need to be mobile for surgery   Patient demonstrates understanding.    Expected  Engagement: good  Follow-Up Plans: calorie goals     Nutrition Goals  1) Follow 1400 calorie/day plan   - Continue to replace 2 meals per day with low-calorie protein shake.  2) Aim for less than 60 gm carb per meal and less than 15 gm carb per snack  3) Eat very slowly, chewing thoroughly. You do not need to force eating on days you are feeling nauseas. Use protein shakes as meal replacement to help meet protein needs. High-fat foods may make your nausea worse.   4) Work to maintain consistency with exercise sessions. Start walking routine with PT.     Meal Replacement Shake Options:   *Protein Shake Criteria: no more than 210 Calories, at least 20 grams of protein, and less than 10 grams of sugar    Peer[facility] smoothie (160 Calories, 20 g protein)   Premier Protein (160 Calories, 30 g protein)  Slim Fast Advanced Nutrition (180 Calories, 20 g protein)  Muscle Milk, lactose-free, 17 oz bottle (210 Calories, 30 g protein)  Integrated Supplements, no artificial sugars (110 Calories, 20 g protein)  Genepro, unflavored protein powder (60 Calories, 30 g protein)  Boost/Ensure Max (160 calories, 30 gm protein)   [employer] Core Power (170 calories, 26 gm protein)    Meal Replacement Bar Options:  [facility] Protein Shake (160 Calories, 15 g protein)  Quest Protein Bars (190 Calories, 20 g protein)  Built Bar (170 Calories, 15-20 g protein)  One Protein Bar (210 calories, 20 g protein)  [place] Signature Protein Bar ([employer]) (190 Calories, 21 g protein)  Pure Protein Bars (180 Calories, 21 g protein)      Protein Sources for Weight Loss  [email or web address]     Carbohydrates  [email or web address]     Mindful Eating  [email or web address]     Diet Guidelines after Weight Loss Surgery  [email or web address]     Seated Exercises for Arms and Legs (can be done before or after surgery)  [email or web address]      Follow-Up:  1-2 month, PRN    Time spent with patient: 22  minutes.  Blossom Caal RD, LD

## 2022-08-29 ENCOUNTER — VIRTUAL VISIT (OUTPATIENT)
Dept: ENDOCRINOLOGY | Facility: CLINIC | Age: 42
End: 2022-08-29
Payer: MEDICARE

## 2022-08-29 DIAGNOSIS — Z71.3 NUTRITIONAL COUNSELING: Primary | ICD-10-CM

## 2022-08-29 DIAGNOSIS — E11.9 TYPE 2 DIABETES MELLITUS WITHOUT COMPLICATION, WITHOUT LONG-TERM CURRENT USE OF INSULIN (H): ICD-10-CM

## 2022-08-29 DIAGNOSIS — E66.9 OBESITY: ICD-10-CM

## 2022-08-29 PROCEDURE — 97803 MED NUTRITION INDIV SUBSEQ: CPT | Mod: GA | Performed by: DIETITIAN, REGISTERED

## 2022-08-29 NOTE — LETTER
Date:August 29, 2022      Patient was self referred, no letter generated. Do not send.        [facility] Health Information

## 2022-08-29 NOTE — LETTER
"8/29/2022       RE: Max Meade  [address]  [place] [address]     Dear Colleague,    Thank you for referring your patient, Max Meade, to the [facility]NAG[facility] [place] at [facility]. Please see a copy of my visit note below.    Max Meade is a 41 year old male who is being evaluated via a billable video visit.      The patient has been notified of following:     \"This video visit will be conducted via a call between you and your physician/provider. We have found that certain health care needs can be provided without the need for an in-person physical exam.  This service lets us provide the care you need with a video conversation.  If a prescription is necessary we can send it directly to your pharmacy.  If lab work is needed we can place an order for that and you can then stop by our lab to have the test done at a later time.    Video visits are billed at different rates depending on your insurance coverage.  Please reach out to your insurance provider with any questions.    If during the course of the call the physician/provider feels a video visit is not appropriate, you will not be charged for this service.\"    Patient has given verbal consent for Video visit? Yes  How would you like to obtain your AVS? MyChart  Will anyone else be joining your video visit? No  {If patient encounters technical issues they should call [phone]      Video-Visit Details    Type of service:  Video Visit    Video Start Time: 10:30 AM  Video End Time: 10:52 AM    Originating Location (pt. Location): Home    Distant Location (provider location):  [facility]NAG[facility] [place]     Platform used for Video Visit: [employer]    During this virtual visit the patient is located in [place], patient verifies this as the location during the entirety of this visit.     Pt signed Medicare ABN form which is " "good for 1 year (8/29/22 - 8/29/23). Faxed to clinic 8/29/22.      Weight Management Nutrition Consultation    Max Meade is a 41 year old male presents today for return weight management nutrition consultation.  Patient referred by Dr. Hdz on December 27, 2021.    Per surgery PA - Patient interested in weight loss surgery which we can discuss with surgeon when weight loss goal of 100 lbs lost met and patient mobile.    Patient with Co-morbidities of obesity including:  Type II DM yes (insulin dependant, managed inpatient currently)  Renal Failure no  Sleep apnea yes  Hypertension no   Dyslipidemia no  Joint pain yes  Back pain yes  GERD no     Anthropometrics:  He reports checking weight weekly    Current weight (pt report): 573 lbs (-11 lbs in past 8 weeks, -94 lbs from initial weight of 667 lbs)    Weight history (per pt report):  3/28/22: 631 lbs   2/14/22: 648 lbs   2/23/22: 642 lbs   1/24/22: 648 lbs    Estimated body mass index is 81.19 kg/m  as calculated from the following:    Height as of an earlier encounter on 12/27/21: 1.93 m (6' 4\").    Weight as of an earlier encounter on 12/27/21: 302.5 kg (667 lb).    Per notes from [facility] RD:  12/13/21 (!) 293.8 kg (647 lb 12.8 oz)   12/08/21 (!) 287.9 kg (634 lb 12.8 oz)   09/27/21 (!) 292.2 kg (644 lb 3.2 oz)     Admitted in March 2021. Notes he started working on weight loss at this time. Weight per care everywhere:   321 kg (707 lb 10.8 oz) 03/18/2021        Medications for Weight Loss:  Ozempic, may be switching to Wegovy   Topiramate    NUTRITION HISTORY  See MD note for details.  Per questionnaire, strong emotional component to eating. Readiness and confidence to change, rated 10 (very ready).    Saw [facility] bariatric RD 6 times, last visit 12/16/21.     Currently admitted (since March 2021) at [facility] in [facility] and working with inpatient RD on weight loss.     He is getting 3 meals and 2 snack per day " from the hospital. Reports no outside foods.   Does not tolerate regular milk.     Jan 2022 -  He was following 1500 calories/day until 1/11, then started following 1350 calories/day. Repots no outside food from hospital meals and snacks. Rolling and moving better. Is able to stand for a minute which he was not doing before. Doing arm movements while in bed. He is interested in weight loss surgery consult with [facility].    Feb 2022 -  Pt reports he is doing PT 4 days per week. States he continues to follow the 1350 calories per day. States he has been talking to his cousin more on the phone which is helping distract between meals/snacks. His cousin has been very supportive to him and he wants to be able to move to [place] where his cousin lives. States he is able to stick to the diet plan at the hospital without issues, denies consuming outside food.     April 2022 - He reports this month has been going well. States PT has been on hold, wants him to lose more weight first before walking. Exercise session 30 mins 4 times per week doing seated exercise bike, medicine ball lifting. Has a couch in room where they are doing exercises.  Has a chart in his room to cross off lbs as he is losing, encouraged by this. Has an image of an outfit he wants to fit one day that helps with motivation.     May 2022 - Not feeling as hungry before bed with higher protein/calorie pm snack. He c/o constipation, likely r/t high protein diet and unable to ambulate.  Doing exercise M-Th with PT, and some on the weekends on his own. Doing a seated peddle, medicine ball, weighted bar, leg kicks, marching in place, and dumb bells.     July 2022 - Notes holidays can be a challenge, felt urge to want to order Chinese take-out on the 4th and then decided it was not worth it. States his long-term wt goal: 220-230 lbs. He is now able to push himself in a wheel chair, and using hospital gym to exercise upper body. Will be able to work on walking in next  couple weeks now wt in the 580s.     Today - Doing stand to sits, side steps, and other leg strengthening activities, progressing well. Feels like he may be able to start walking this week. Weight continues to trend down. Keeping to 1400 ronna/day diet. May feel nauseous on tues/wed, following the weekly Ozempic injections. Taking zofran, sprite zero, crackers as needed to cope with nausea. Feels like he forces himself to eat at times.     1400 calories/day (30 gm pro/meal, <60 gm CHO/meal, <15 gm CHO/snack):  Breakfast: protein shake + english muffin + 1 pc sausage + egg   12 pm snack: 1/2 cup fresh fruit bowl (grapes, pineapple, strawberries and melon)  Lunch 2pm: protein shake with watermelon/grapes  4pm snack: 70 ronna popcorn  Dinner: 6 pm Grilled cheese and ham and soup, string cheese, 1/2 banana (or GF cookie on Friday), coke zero and protein shake (fridays cake with dinner)  10 pm: Greek yogurt and honey and string cheese - taking with medication   Beverages: water/crystal lite (5-6 x 8 oz/day), one coke zero daily.    Progress Towards Previous Goals:  1) Follow 1500 calorie/day plan - Met, continues.    - Continue to replace 2 meals per day with low-calorie protein shake.  2) Aim for less than 60 gm carb per meal and less than 15 gm carb per snack - Met, continues  3) Use distractions between meals/snacks - calling your cousin, social media, journaling. - Met, continues  4) Work to maintain consistency with exercise sessions. Start walking routine with PT. - Met, continues    Physical Activity:  PT/OT and exercise 4 times per week for 30 mins and doing seated and now standing exercises.    Nutrition Prescription  Recommended energy/nutrient modification.  1500 calories/day or less (per MD)    Nutrition Diagnosis  Obesity r/t long history of positive energy balance aeb BMI >30. - improving/continues    Nutrition Intervention  Materials/education provided:  - Reviewed progress towards previous goals  - Reviewed  strategy for weight loss - continuing meal plan as established. Encouraged consistency with exercise sessions and starting walking routine as soon as able.  - Praised pt on weight loss this past month and consistency with hypocaloric diet.   - Discussed need to be mobile for surgery   Patient demonstrates understanding.    Expected Engagement: good  Follow-Up Plans: calorie goals     Nutrition Goals  1) Follow 1400 calorie/day plan   - Continue to replace 2 meals per day with low-calorie protein shake.  2) Aim for less than 60 gm carb per meal and less than 15 gm carb per snack  3) Eat very slowly, chewing thoroughly. You do not need to force eating on days you are feeling nauseas. Use protein shakes as meal replacement to help meet protein needs. High-fat foods may make your nausea worse.   4) Work to maintain consistency with exercise sessions. Start walking routine with PT.     Meal Replacement Shake Options:   *Protein Shake Criteria: no more than 210 Calories, at least 20 grams of protein, and less than 10 grams of sugar   [facility] smoothie (160 Calories, 20 g protein)   Premier Protein (160 Calories, 30 g protein)  Slim Fast Advanced Nutrition (180 Calories, 20 g protein)  Muscle Milk, lactose-free, 17 oz bottle (210 Calories, 30 g protein)  Integrated Supplements, no artificial sugars (110 Calories, 20 g protein)  Genepro, unflavored protein powder (60 Calories, 30 g protein)  Boost/Ensure Max (160 calories, 30 gm protein)   [email or web address] Core Power (170 calories, 26 gm protein)    Meal Replacement Bar Options:  [facility] Protein Shake (160 Calories, 15 g protein)  Quest Protein Bars (190 Calories, 20 g protein)  Built Bar (170 Calories, 15-20 g protein)  One Protein Bar (210 calories, 20 g protein)  [place] Signature Protein Bar ([employer]) (190 Calories, 21 g protein)  Pure Protein Bars (180 Calories, 21 g protein)      Protein Sources for Weight Loss  [email or web address]      Carbohydrates  [email or web address]     Mindful Eating  [email or web address]     Diet Guidelines after Weight Loss Surgery  [email or web address]     Seated Exercises for Arms and Legs (can be done before or after surgery)  [email or web address]      Follow-Up:  1-2 month, PRN    Time spent with patient: 22 minutes.  Blossom Caal RD, LD      Again, thank you for allowing me to participate in the care of your patient.      Sincerely,    Blossom Caal RD

## 2022-08-29 NOTE — PATIENT INSTRUCTIONS
Joe Santana,    Follow-up with RD on 9/26    Thank you,    Blossom Caal, RD, LD  If you would like to schedule or reschedule an appointment with the RD, please call [phone]    Nutrition Goals  1) Follow 1400 calorie/day plan   - Continue to replace 2 meals per day with low-calorie protein shake.  2) Aim for less than 60 gm carb per meal and less than 15 gm carb per snack  3) Eat very slowly, chewing thoroughly. You do not need to force eating on days you are feeling nauseas. Use protein shakes as meal replacement to help meet protein needs. High-fat foods may make your nausea worse.   4) Work to maintain consistency with exercise sessions. Start walking routine with PT.     Meal Replacement Shake Options:   *Protein Shake Criteria: no more than 210 Calories, at least 20 grams of protein, and less than 10 grams of sugar   [facility] smoothie (160 Calories, 20 g protein)   Premier Protein (160 Calories, 30 g protein)  Slim Fast Advanced Nutrition (180 Calories, 20 g protein)  Muscle Milk, lactose-free, 17 oz bottle (210 Calories, 30 g protein)  Integrated Supplements, no artificial sugars (110 Calories, 20 g protein)  Genepro, unflavored protein powder (60 Calories, 30 g protein)  Boost/Ensure Max (160 calories, 30 gm protein)   View3 Core Power (170 calories, 26 gm protein)    Meal Replacement Bar Options:  [facility] Protein Shake (160 Calories, 15 g protein)  Quest Protein Bars (190 Calories, 20 g protein)  Built Bar (170 Calories, 15-20 g protein)  One Protein Bar (210 calories, 20 g protein)  [place] Signature Protein Bar ([employer]) (190 Calories, 21 g protein)  Pure Protein Bars (180 Calories, 21 g protein)      Protein Sources for Weight Loss  [email or web address]     Carbohydrates  [email or web address]     Mindful Eating  [email or web address]     Diet Guidelines after Weight Loss Surgery  [email or web address]     Seated Exercises for Arms and Legs (can be done  before or after surgery)  [email or web address]    Interested in working with a health ?  Health coaches work with you to improve your overall health and wellbeing.  They look at the whole person, and may involve discussion of different areas of life, including, but not limited to the four pillars of health (sleep, exercise, nutrition, and stress management). Discuss with your care team if you would like to start working a health .    Health Coaching-3 Pack:    $99 for three health coaching visits    Visits may be done in person or via phone    Coaching is a partnership between the  and the client; Coaches do not prescribe or diagnose    Coaching helps inspire the client to reach his/her personal goals      COMPREHENSIVE WEIGHT MANAGEMENT PROGRAM  VIRTUAL SUPPORT GROUPS    For Support Group Information:      We offer support groups for patients who are working on weight loss and considering, preparing for or have had weight loss surgery.   There is no cost for this opportunity.  You are invited to attend the?Virtual Support Groups?provided by any of the following locations:    [facility] via [employer] Teams with Sarah Ceja RN  2.   [place] via [email or web address] with Obed Davenport, PhD, LP  3.   [place] [employer] with Gabi Winchester RN  4.   [facility] via [employer] Teams with Gabi Walters [facility]    The following Support Group information can also be found on our website:  [email or web address]      [facility] Weight Loss Surgery Support Group    [facility] Weight Loss Surgery Support Group  The support group is a patient-lead forum that meets monthly to share experiences, encouragement and education. It is open to those who have had weight loss surgery, are scheduled for surgery, and those who are considering surgery.   WHEN: This group meets on the 3rd Wednesday of each month from  "5:00PM - 6:00PM virtually using Microsoft Teams.   FACILITATOR: Led by Sarah Ceja, JOSEPHINE, LD, RN, the program's Clinical Coordinator.   TO REGISTER: Please contact the clinic via [employer] or call the nurse line directly at [phone] to inform our staff that you would like an invite sent to you and to let us know the email you would like the invite sent to. Prior to the meeting, a link with directions on how to join the meeting will be sent to you.    2022 Meetings  January 19: \"Let's Talk\" a time for the group to share.  February 16: \"Let's Talk\" a time for the group to share.  March 16: Guest Speakers: Psychologists, Rosina Polanco, PhD, and Katrin Marie PsyD,  April 20: Guest Speaker: Health , Jacquelin Churchill, [facility],CHES, CPT  May 18: Guest Speaker: Dietitian, Zay Gabriel, JOSEPHINE, LP  Geraldine 15: \"Let's Talk\" a time for the group to share.  July 20: \"Let's Talk\" a time for the group to share.  August 17: TBA  September 21: TBA  October 19: Guest Speaker: Dr Geovany Amezcua MD Pulmonologist and Sleep Medicine Physician, \"Getting a Good Night's Sleep\".  November 16: TBA  December 21: TBA    [facility] Support Groups    Connections: Bariatric Care Support Group?  This is open to all [facility] (and those external to this program) pre- and post- operative bariatric surgery patients as well as their support system.   WHEN: This group meets the 2nd Tuesday of each month from 6:30 PM - 8:00 PM virtually using Microsoft Teams.   FACILITATOR: Led by Obed Davenport, Ph.D who is a Licensed Psychologist with the [facility] Comprehensive Weight Management Program.   TO REGISTER: Please send an email to Obed Davenport, Ph.D.,  at?[email or web address]?if you would like an invitation to the group and to learn about using Microsoft Teams.    2022 Meetings January 11: Rupa Melgar, PharmD, Pharmacy Resident at [facility], \"Medications and Bariatric " "Surgery\".  February 8: Open Forum  March 8  April 12  May 10  Geraldine 14    Connections: Post-Operative Bariatric Surgery Support Group  This is a support group for [facility] bariatric patients (and those external to [facility]) who have had bariatric surgery and are at least 3 months post-surgery.  WHEN: This support group meets the 4th Wednesday of the month from 11:00 AM - 12:00 PM virtually using Microsoft Teams.   FACILITATOR: Led by Certified Bariatric Nurse, Gabi Winchester RN.   TO REGISTER: Please send an email to Gabi at [email or web address] if you would like an invitation to the group and to learn about using Microsoft Teams.    2022 Meetings  January 26  February 23  March 23  April 27  May 25  Geraldine 22    [facility] Healthy Lifestyle Virtual Support Group    Healthy Lifestyle Virtual Support Group?  This is 60 minutes of small group guided discussion, support and resources. All are welcome who want a healthy lifestyle.  WHEN: This group meets monthly on a Friday from 12:30 PM - 1:30 PM virtually using Microsoft Teams.   FACILITATOR: Led by National Board Certified Health and , Gabi Walters [facility].   TO REGISTER: Please send an email to Gabi [email or web address] to receive monthly invites to the group or if you have any questions about having a health .  Prior to the meeting, a link with directions on how to join the meeting will be sent to you.    2022 Meetings  MAY 20: OPEN FORUM  GERALDINE: 24:  Setting Limits and BoundariesGabi  July 29: OPEN FORUM  August 26: Special Guest Registered Dietician Yola De La Fuente  Sept 30: OPEN FORUM  Oct 28, Dorcas Vega National Board Certified Health   Nov 18: Navigating How to Eat Around the Holidays with JOSEPHINE Hu  Dec 16: Changing your relationship with movement with  Jacquelin National Board Certified Health           "

## 2022-09-26 ENCOUNTER — VIRTUAL VISIT (OUTPATIENT)
Dept: ENDOCRINOLOGY | Facility: CLINIC | Age: 42
End: 2022-09-26
Payer: MEDICARE

## 2022-09-26 DIAGNOSIS — E11.9 TYPE 2 DIABETES MELLITUS WITHOUT COMPLICATION, WITHOUT LONG-TERM CURRENT USE OF INSULIN (H): ICD-10-CM

## 2022-09-26 DIAGNOSIS — Z71.3 NUTRITIONAL COUNSELING: Primary | ICD-10-CM

## 2022-09-26 DIAGNOSIS — E66.9 OBESITY: ICD-10-CM

## 2022-09-26 PROCEDURE — 97803 MED NUTRITION INDIV SUBSEQ: CPT | Mod: GA | Performed by: DIETITIAN, REGISTERED

## 2022-09-26 NOTE — PROGRESS NOTES
"Max Meade is a 42 year old male who is being evaluated via a billable video visit.      The patient has been notified of following:     \"This video visit will be conducted via a call between you and your physician/provider. We have found that certain health care needs can be provided without the need for an in-person physical exam.  This service lets us provide the care you need with a video conversation.  If a prescription is necessary we can send it directly to your pharmacy.  If lab work is needed we can place an order for that and you can then stop by our lab to have the test done at a later time.    Video visits are billed at different rates depending on your insurance coverage.  Please reach out to your insurance provider with any questions.    If during the course of the call the physician/provider feels a video visit is not appropriate, you will not be charged for this service.\"    Patient has given verbal consent for Video visit? Yes  How would you like to obtain your AVS? MyChart  Will anyone else be joining your video visit? No  {If patient encounters technical issues they should call [phone]      Video-Visit Details    Type of service:  Video Visit    Video Start Time: 10:31 AM  Video End Time: 11:00 AM    Originating Location (pt. Location): Home    Distant Location (provider location):  [facility] [place]     Platform used for Video Visit: [employer]    During this virtual visit the patient is located in [place], patient verifies this as the location during the entirety of this visit.     Pt signed Medicare ABN form which is good for 1 year (8/29/22 - 8/29/23). Scanned into chart 8/30/22.      Weight Management Nutrition Consultation    Max Meade is a 42 year old male presents today for return weight management nutrition consultation.  Patient referred by Dr. Hdz on December 27, 2021.    Per surgery PA - Patient interested in weight loss surgery " "which we can discuss with surgeon when weight loss goal of 100 lbs lost met and patient mobile.    Patient with Co-morbidities of obesity including:  Type II DM yes (insulin dependant, managed inpatient currently)  Renal Failure no  Sleep apnea yes  Hypertension no   Dyslipidemia no  Joint pain yes  Back pain yes  GERD no     Anthropometrics:  He reports checking weight weekly    Current weight (pt report): 569 lbs (-4 lbs over the past month, -98 lbs from initial weight of 667 lbs)    Weight history (per pt report):  3/28/22: 631 lbs   2/14/22: 648 lbs   2/23/22: 642 lbs   1/24/22: 648 lbs    Estimated body mass index is 81.19 kg/m  as calculated from the following:    Height as of an earlier encounter on 12/27/21: 1.93 m (6' 4\").    Weight as of an earlier encounter on 12/27/21: 302.5 kg (667 lb).    Per notes from [facility] Bariatric RD:  12/13/21 (!) 293.8 kg (647 lb 12.8 oz)   12/08/21 (!) 287.9 kg (634 lb 12.8 oz)   09/27/21 (!) 292.2 kg (644 lb 3.2 oz)     Admitted in March 2021. Notes he started working on weight loss at this time. Weight per care everywhere:   321 kg (707 lb 10.8 oz) 03/18/2021        Medications for Weight Loss:  Ozempic, may be switching to Wegovy   Topiramate    NUTRITION HISTORY  See MD note for details.  Per questionnaire, strong emotional component to eating. Readiness and confidence to change, rated 10 (very ready).    Saw [facility] bariatric RD 6 times, last visit 12/16/21.     Currently admitted (since March 2021) at [facility] in [facility] and working with inpatient RD on weight loss.     He is getting 3 meals and 2 snack per day from the hospital. Reports no outside foods.   Does not tolerate regular milk.     Jan 2022 -  He was following 1500 calories/day until 1/11, then started following 1350 calories/day. Repots no outside food from hospital meals and snacks. Rolling and moving better. Is able to stand for a minute which he was not doing before. Doing " arm movements while in bed. He is interested in weight loss surgery consult with [facility].    Feb 2022 -  Pt reports he is doing PT 4 days per week. States he continues to follow the 1350 calories per day. States he has been talking to his cousin more on the phone which is helping distract between meals/snacks. His cousin has been very supportive to him and he wants to be able to move to [place] where his cousin lives. States he is able to stick to the diet plan at the hospital without issues, denies consuming outside food.     April 2022 - He reports this month has been going well. States PT has been on hold, wants him to lose more weight first before walking. Exercise session 30 mins 4 times per week doing seated exercise bike, medicine ball lifting. Has a couch in room where they are doing exercises.  Has a chart in his room to cross off lbs as he is losing, encouraged by this. Has an image of an outfit he wants to fit one day that helps with motivation.     May 2022 - Not feeling as hungry before bed with higher protein/calorie pm snack. He c/o constipation, likely r/t high protein diet and unable to ambulate.  Doing exercise M-Th with PT, and some on the weekends on his own. Doing a seated peddle, medicine ball, weighted bar, leg kicks, marching in place, and dumb bells.     July 2022 - Notes holidays can be a challenge, felt urge to want to order Chinese take-out on the 4th and then decided it was not worth it. States his long-term wt goal: 220-230 lbs. He is now able to push himself in a wheel chair, and using hospital gym to exercise upper body. Will be able to work on walking in next couple weeks now wt in the 580s.     August 2022 - Doing stand to sits, side steps, and other leg strengthening activities, progressing well. Feels like he may be able to start walking this week. Weight continues to trend down. Keeping to 1400 ronna/day diet. May feel nauseous on tues/wed, following the weekly Ozempic injections.  Taking zofran, sprite zero, crackers as needed to cope with nausea. Feels like he forces himself to eat at times.     Today - Making progress with walking, is able to walk backwards and sideways, has not been able to walk forward yet. He reports this is d/t knee pain/issues. Has appt with ortho today to have knee evaluated and discuss treatment options. Has been looking at post-op recipes resources to explore new meal ideas.      1400 calories/day (30 gm pro/meal, <60 gm CHO/meal, <15 gm CHO/snack):  Breakfast: protein shake + english muffin + 1 pc sausage + egg   12 pm snack: 1/2 cup fresh fruit bowl (grapes, pineapple, strawberries and melon)  Lunch 2pm: protein shake with watermelon/grapes  4pm snack: 70 ronna popcorn (white cheddar)  Dinner: 6 pm Grilled cheese and ham and side chicken noodle soup (or GF cookie on Friday), coke zero and protein shake (with 1/2 banana)  10 pm: Greek yogurt and honey and string cheese - taking with medication   Beverages: water/crystal lite (5-6 x 8 oz/day), one coke zero daily.    Progress Towards Previous Goals:  1) Follow 1400 calorie/day plan - Continues    - Continue to replace 2 meals per day with low-calorie protein shake.  2) Aim for less than 60 gm carb per meal and less than 15 gm carb per snack - Continues  3) Eat very slowly, chewing thoroughly. You do not need to force eating on days you are feeling nauseas. Use protein shakes as meal replacement to help meet protein needs. High-fat foods may make your nausea worse. - Continues  4) Work to maintain consistency with exercise sessions. Start walking routine with PT. - Improving       Physical Activity:  PT/OT and exercise 4 times per week for 30 mins and doing seated and now standing exercises.    Nutrition Prescription  Recommended energy/nutrient modification.  1500 calories/day or less (per MD)    Nutrition Diagnosis  Obesity r/t long history of positive energy balance aeb BMI >30. - improving/continues    Nutrition  Intervention  Materials/education provided:  - Reviewed progress towards previous goals  - Reviewed dietary strategy for weight loss and management of type 2 DM - continuing hypocaloric and low/moderate carb meal plan as established. Praised pt on weight loss this past month and consistency with hypocaloric diet.  - Discussed need to be mobile for surgery. Encouraged consistency with exercise sessions and PT. Praised pt on the progress he has made with this.    - Discussed post-op diet guidelines and meal ideas.     Patient demonstrates understanding.    Expected Engagement: good  Follow-Up Plans: calorie goals     Nutrition Goals  1) Follow 1400 calorie/day plan   - Continue to replace 2 meals per day with low-calorie protein shake.  2) Aim for less than 60 gm carb per meal and less than 15 gm carb per snack  3) Eat very slowly, chewing thoroughly. You do not need to force eating on days you are feeling nauseas. Use protein shakes as meal replacement to help meet protein needs. High-fat foods may make your nausea worse.   4) Work to maintain consistency with exercise sessions. Keep up with walking routine with PT.     Meal Replacement Shake Options:   *Protein Shake Criteria: no more than 210 Calories, at least 20 grams of protein, and less than 10 grams of sugar   [facility] smoothie (160 Calories, 20 g protein)   Premier Protein (160 Calories, 30 g protein)  Slim Fast Advanced Nutrition (180 Calories, 20 g protein)  Muscle Milk, lactose-free, 17 oz bottle (210 Calories, 30 g protein)  Integrated Supplements, no artificial sugars (110 Calories, 20 g protein)  Genepro, unflavored protein powder (60 Calories, 30 g protein)  Boost/Ensure Max (160 calories, 30 gm protein)   [employer] Core Power (170 calories, 26 gm protein)    Meal Replacement Bar Options:   [facility] Protein Shake (160 Calories, 15 g protein)  Quest Protein Bars (190 Calories, 20 g protein)  Built Bar (170 Calories, 15-20 g protein)  One Protein  Bar (210 calories, 20 g protein)  [place] Signature Protein Bar ([employer]) (190 Calories, 21 g protein)  Pure Protein Bars (180 Calories, 21 g protein)      Protein Sources for Weight Loss  [email or web address]     Carbohydrates  [email or web address]     Mindful Eating  [email or web address]     Diet Guidelines after Weight Loss Surgery  [email or web address]     Seated Exercises for Arms and Legs (can be done before or after surgery)  [email or web address]      Follow-Up:  1-2 month, PRN    Time spent with patient: 29 minutes.  Blossom Caal RD, LD

## 2022-09-26 NOTE — PATIENT INSTRUCTIONS
Joe Santana,    Follow-up with RD on 11/17    Thank you,    Blossom Caal, RD, LD  If you would like to schedule or reschedule an appointment with the RD, please call [phone]    Nutrition Goals  1) Follow 1400 calorie/day plan   - Continue to replace 2 meals per day with low-calorie protein shake.  2) Aim for less than 60 gm carb per meal and less than 15 gm carb per snack  3) Eat very slowly, chewing thoroughly. You do not need to force eating on days you are feeling nauseas. Use protein shakes as meal replacement to help meet protein needs. High-fat foods may make your nausea worse.   4) Work to maintain consistency with exercise sessions. Keep up with walking routine with PT.     Interested in working with a health ?  Health coaches work with you to improve your overall health and wellbeing.  They look at the whole person, and may involve discussion of different areas of life, including, but not limited to the four pillars of health (sleep, exercise, nutrition, and stress management). Discuss with your care team if you would like to start working a health .    Health Coaching-3 Pack:    $99 for three health coaching visits    Visits may be done in person or via phone    Coaching is a partnership between the  and the client; Coaches do not prescribe or diagnose    Coaching helps inspire the client to reach his/her personal goals      COMPREHENSIVE WEIGHT MANAGEMENT PROGRAM  VIRTUAL SUPPORT GROUPS    For Support Group Information:      We offer support groups for patients who are working on weight loss and considering, preparing for or have had weight loss surgery.   There is no cost for this opportunity.  You are invited to attend the?Virtual Support Groups?provided by any of the following locations:    [facility] via [employer] Teams with Sarah Ceja RN  2.   [place] via [employer] with Obed Davenport, PhD, LP  3.   [place] via [employer] with Gabi Winchester RN  4.   [facility]  "[place] via [employer] with Gabi Walters [facility]    The following Support Group information can also be found on our website:  [email or web address]      [facility] Weight Loss Surgery Support Group    [facility] Weight Loss Surgery Support Group  The support group is a patient-lead forum that meets monthly to share experiences, encouragement and education. It is open to those who have had weight loss surgery, are scheduled for surgery, and those who are considering surgery.   WHEN: This group meets on the 3rd Wednesday of each month from 5:00PM - 6:00PM virtually using Microsoft Teams.   FACILITATOR: Led by Sarah Ceja RD, LD, RN, the program's Clinical Coordinator.   TO REGISTER: Please contact the clinic via [employer] or call the nurse line directly at [phone] to inform our staff that you would like an invite sent to you and to let us know the email you would like the invite sent to. Prior to the meeting, a link with directions on how to join the meeting will be sent to you.    2022 Meetings  January 19: \"Let's Talk\" a time for the group to share.  February 16: \"Let's Talk\" a time for the group to share.  March 16: Guest Speakers: Psychologists, Rosina Polanco, PhD,LP and Katrin Marie, PsyD,  April 20: Guest Speaker: Health Jacquelin, [facility],CHES, CPT  May 18: Guest Speaker: DietitianZay, JOSEPHINE, LP  Geraldine 15: \"Let's Talk\" a time for the group to share.  July 20: \"Let's Talk\" a time for the group to share.  August 17: TBA  September 21: TBA  October 19: Guest Speaker: Dr Geovany Amezcua MD Pulmonologist and Sleep Medicine Physician, \"Getting a Good Night's Sleep\".  November 16: TBA  December 21: TBA    [facility] Support Groups    Connections: Bariatric Care Support Group?  This is open to all [facility] (and those external to this program) pre- and post- " "operative bariatric surgery patients as well as their support system.   WHEN: This group meets the 2nd Tuesday of each month from 6:30 PM - 8:00 PM virtually using Microsoft Teams.   FACILITATOR: Led by Obed Davenport, Ph.D who is a Licensed Psychologist with the [facility] Comprehensive Weight Management Program.   TO REGISTER: Please send an email to Obed Davenport, Ph.D., LP at?[email or web address]?if you would like an invitation to the group and to learn about using Microsoft Teams.    2022 Meetings  January 11: Rupa Melgar, PharmD, Pharmacy Resident at [facility], \"Medications and Bariatric Surgery\".  February 8: Open Forum  March 8  April 12  May 10  Geraldine 14    Connections: Post-Operative Bariatric Surgery Support Group  This is a support group for [facility] bariatric patients (and those external to [facility]) who have had bariatric surgery and are at least 3 months post-surgery.  WHEN: This support group meets the 4th Wednesday of the month from 11:00 AM - 12:00 PM virtually using Microsoft Teams.   FACILITATOR: Led by Certified Bariatric Nurse, Gabi Winchester RN.   TO REGISTER: Please send an email to Gabi at [email or web address] if you would like an invitation to the group and to learn about using Microsoft Teams.    2022 Meetings  January 26  February 23  March 23  April 27  May 25  Geraldine 22    [facility] Healthy Lifestyle Virtual Support Group    Healthy Lifestyle Virtual Support Group?  This is 60 minutes of small group guided discussion, support and resources. All are welcome who want a healthy lifestyle.  WHEN: This group meets monthly on a Friday from 12:30 PM - 1:30 PM virtually using Microsoft Teams.   FACILITATOR: Led by National Board Certified Health and , Gabi Walters [facility].   TO REGISTER: Please send an email to Gabi [email or web address] to receive monthly invites to the group or if you have " any questions about having a health .  Prior to the meeting, a link with directions on how to join the meeting will be sent to you.    2022 Meetings  MAY 20: OPEN FORUM  JUNE: 24:  Setting Limits and BoundariesGabi  July 29: OPEN FORUM  August 26: Special Guest Registered Dietician Yola De La Fuente  Sept 30: OPEN FORUM  Oct 28, Gratitude Dorcas Armstrong National Board Certified Health   Nov 18: Navigating How to Eat Around the Holidays with JOSEPHINE Hu  Dec 16: Changing your relationship with movement with  Jacquelin National Board Certified Health

## 2022-09-26 NOTE — LETTER
"9/26/2022       RE: Max Meade  [address]  [place] [address]     Dear Colleague,    Thank you for referring your patient, Max Meade, to the [facility]NA[facility] [place] at [facility]. Please see a copy of my visit note below.    Max Meade is a 42 year old male who is being evaluated via a billable video visit.      The patient has been notified of following:     \"This video visit will be conducted via a call between you and your physician/provider. We have found that certain health care needs can be provided without the need for an in-person physical exam.  This service lets us provide the care you need with a video conversation.  If a prescription is necessary we can send it directly to your pharmacy.  If lab work is needed we can place an order for that and you can then stop by our lab to have the test done at a later time.    Video visits are billed at different rates depending on your insurance coverage.  Please reach out to your insurance provider with any questions.    If during the course of the call the physician/provider feels a video visit is not appropriate, you will not be charged for this service.\"    Patient has given verbal consent for Video visit? Yes  How would you like to obtain your AVS? MyChart  Will anyone else be joining your video visit? No  {If patient encounters technical issues they should call [phone]      Video-Visit Details    Type of service:  Video Visit    Video Start Time: 10:31 AM  Video End Time: 11:00 AM    Originating Location (pt. Location): Home    Distant Location (provider location):  [facility] [place]     Platform used for Video Visit: [employer]    During this virtual visit the patient is located in [place], patient verifies this as the location during the entirety of this visit.     Pt signed Medicare ABN form which is " "good for 1 year (8/29/22 - 8/29/23). Scanned into chart 8/30/22.      Weight Management Nutrition Consultation    Max Meade is a 42 year old male presents today for return weight management nutrition consultation.  Patient referred by Dr. Hdz on December 27, 2021.    Per surgery PA - Patient interested in weight loss surgery which we can discuss with surgeon when weight loss goal of 100 lbs lost met and patient mobile.    Patient with Co-morbidities of obesity including:  Type II DM yes (insulin dependant, managed inpatient currently)  Renal Failure no  Sleep apnea yes  Hypertension no   Dyslipidemia no  Joint pain yes  Back pain yes  GERD no     Anthropometrics:  He reports checking weight weekly    Current weight (pt report): 569 lbs (-4 lbs over the past month, -98 lbs from initial weight of 667 lbs)    Weight history (per pt report):  3/28/22: 631 lbs   2/14/22: 648 lbs   2/23/22: 642 lbs   1/24/22: 648 lbs    Estimated body mass index is 81.19 kg/m  as calculated from the following:    Height as of an earlier encounter on 12/27/21: 1.93 m (6' 4\").    Weight as of an earlier encounter on 12/27/21: 302.5 kg (667 lb).    Per notes from [facility] Bariatric RD:  12/13/21 (!) 293.8 kg (647 lb 12.8 oz)   12/08/21 (!) 287.9 kg (634 lb 12.8 oz)   09/27/21 (!) 292.2 kg (644 lb 3.2 oz)     Admitted in March 2021. Notes he started working on weight loss at this time. Weight per care everywhere:   321 kg (707 lb 10.8 oz) 03/18/2021        Medications for Weight Loss:  Ozempic, may be switching to Wegovy   Topiramate    NUTRITION HISTORY  See MD note for details.  Per questionnaire, strong emotional component to eating. Readiness and confidence to change, rated 10 (very ready).    Saw [facility] bariatric RD 6 times, last visit 12/16/21.     Currently admitted (since March 2021) at [facility] in [facility] and working with inpatient RD on weight loss.     He is getting 3 meals and 2 snack " per day from the hospital. Reports no outside foods.   Does not tolerate regular milk.     Jan 2022 -  He was following 1500 calories/day until 1/11, then started following 1350 calories/day. Repots no outside food from hospital meals and snacks. Rolling and moving better. Is able to stand for a minute which he was not doing before. Doing arm movements while in bed. He is interested in weight loss surgery consult with [facility].    Feb 2022 -  Pt reports he is doing PT 4 days per week. States he continues to follow the 1350 calories per day. States he has been talking to his cousin more on the phone which is helping distract between meals/snacks. His cousin has been very supportive to him and he wants to be able to move to [place] where his cousin lives. States he is able to stick to the diet plan at the hospital without issues, denies consuming outside food.     April 2022 - He reports this month has been going well. States PT has been on hold, wants him to lose more weight first before walking. Exercise session 30 mins 4 times per week doing seated exercise bike, medicine ball lifting. Has a couch in room where they are doing exercises.  Has a chart in his room to cross off lbs as he is losing, encouraged by this. Has an image of an outfit he wants to fit one day that helps with motivation.     May 2022 - Not feeling as hungry before bed with higher protein/calorie pm snack. He c/o constipation, likely r/t high protein diet and unable to ambulate.  Doing exercise M-Th with PT, and some on the weekends on his own. Doing a seated peddle, medicine ball, weighted bar, leg kicks, marching in place, and dumb bells.     July 2022 - Notes holidays can be a challenge, felt urge to want to order Chinese take-out on the 4th and then decided it was not worth it. States his long-term wt goal: 220-230 lbs. He is now able to push himself in a wheel chair, and using hospital gym to exercise upper body. Will be able to work on walking  in next couple weeks now wt in the 580s.     August 2022 - Doing stand to sits, side steps, and other leg strengthening activities, progressing well. Feels like he may be able to start walking this week. Weight continues to trend down. Keeping to 1400 ronna/day diet. May feel nauseous on tues/wed, following the weekly Ozempic injections. Taking zofran, sprite zero, crackers as needed to cope with nausea. Feels like he forces himself to eat at times.     Today - Making progress with walking, is able to walk backwards and sideways, has not been able to walk forward yet. He reports this is d/t knee pain/issues. Has appt with ortho today to have knee evaluated and discuss treatment options. Has been looking at post-op recipes resources to explore new meal ideas.      1400 calories/day (30 gm pro/meal, <60 gm CHO/meal, <15 gm CHO/snack):  Breakfast: protein shake + english muffin + 1 pc sausage + egg   12 pm snack: 1/2 cup fresh fruit bowl (grapes, pineapple, strawberries and melon)  Lunch 2pm: protein shake with watermelon/grapes  4pm snack: 70 ronna popcorn (white cheddar)  Dinner: 6 pm Grilled cheese and ham and side chicken noodle soup (or GF cookie on Friday), coke zero and protein shake (with 1/2 banana)  10 pm: Greek yogurt and honey and string cheese - taking with medication   Beverages: water/crystal lite (5-6 x 8 oz/day), one coke zero daily.    Progress Towards Previous Goals:  1) Follow 1400 calorie/day plan - Continues    - Continue to replace 2 meals per day with low-calorie protein shake.  2) Aim for less than 60 gm carb per meal and less than 15 gm carb per snack - Continues  3) Eat very slowly, chewing thoroughly. You do not need to force eating on days you are feeling nauseas. Use protein shakes as meal replacement to help meet protein needs. High-fat foods may make your nausea worse. - Continues  4) Work to maintain consistency with exercise sessions. Start walking routine with PT. - Improving        Physical Activity:  PT/OT and exercise 4 times per week for 30 mins and doing seated and now standing exercises.    Nutrition Prescription  Recommended energy/nutrient modification.  1500 calories/day or less (per MD)    Nutrition Diagnosis  Obesity r/t long history of positive energy balance aeb BMI >30. - improving/continues    Nutrition Intervention  Materials/education provided:  - Reviewed progress towards previous goals  - Reviewed dietary strategy for weight loss and management of type 2 DM - continuing hypocaloric and low/moderate carb meal plan as established. Praised pt on weight loss this past month and consistency with hypocaloric diet.  - Discussed need to be mobile for surgery. Encouraged consistency with exercise sessions and PT. Praised pt on the progress he has made with this.    - Discussed post-op diet guidelines and meal ideas.     Patient demonstrates understanding.    Expected Engagement: good  Follow-Up Plans: calorie goals     Nutrition Goals  1) Follow 1400 calorie/day plan   - Continue to replace 2 meals per day with low-calorie protein shake.  2) Aim for less than 60 gm carb per meal and less than 15 gm carb per snack  3) Eat very slowly, chewing thoroughly. You do not need to force eating on days you are feeling nauseas. Use protein shakes as meal replacement to help meet protein needs. High-fat foods may make your nausea worse.   4) Work to maintain consistency with exercise sessions. Keep up with walking routine with PT.     Meal Replacement Shake Options:   *Protein Shake Criteria: no more than 210 Calories, at least 20 grams of protein, and less than 10 grams of sugar   [facility] smoothie (160 Calories, 20 g protein)   Premier Protein (160 Calories, 30 g protein)  Slim Fast Advanced Nutrition (180 Calories, 20 g protein)  Muscle Milk, lactose-free, 17 oz bottle (210 Calories, 30 g protein)  Integrated Supplements, no artificial sugars (110 Calories, 20 g protein)  Genepro,  unflavored protein powder (60 Calories, 30 g protein)  Boost/Ensure Max (160 calories, 30 gm protein)   [employer]life Core Power (170 calories, 26 gm protein)    Meal Replacement Bar Options:  [facility] Protein Shake (160 Calories, 15 g protein)  Quest Protein Bars (190 Calories, 20 g protein)  Built Bar (170 Calories, 15-20 g protein)  One Protein Bar (210 calories, 20 g protein)  [place] Signature Protein Bar ([employer]) (190 Calories, 21 g protein)  Pure Protein Bars (180 Calories, 21 g protein)      Protein Sources for Weight Loss  [email or web address]     Carbohydrates  [email or web address]     Mindful Eating  [email or web address]     Diet Guidelines after Weight Loss Surgery  [email or web address]     Seated Exercises for Arms and Legs (can be done before or after surgery)  [email or web address]      Follow-Up:  1-2 month, PRN    Time spent with patient: 29 minutes.  Blossom Caal RD, LD      Again, thank you for allowing me to participate in the care of your patient.      Sincerely,    Blossom Caal RD

## 2022-09-26 NOTE — LETTER
Date:September 26, 2022      Patient was self referred, no letter generated. Do not send.        [facility] Health Information

## 2022-10-03 ENCOUNTER — HEALTH MAINTENANCE LETTER (OUTPATIENT)
Age: 42
End: 2022-10-03

## 2022-10-19 ENCOUNTER — TELEPHONE (OUTPATIENT)
Dept: ENDOCRINOLOGY | Facility: CLINIC | Age: 42
End: 2022-10-19

## 2022-10-19 NOTE — TELEPHONE ENCOUNTER
"Dale Kinney, phys therapist at [facility] in [place] called.  His cell:  [phone]    \"I wanted her to know that pt is manipulative, and in my opinion not being honest with her about the work he's putting in and his progression\".    Happy to talk to you about it.  "

## 2022-11-14 ENCOUNTER — VIRTUAL VISIT (OUTPATIENT)
Dept: ENDOCRINOLOGY | Facility: CLINIC | Age: 42
End: 2022-11-14
Payer: MEDICARE

## 2022-11-14 VITALS — HEIGHT: 76 IN | WEIGHT: 315 LBS | BODY MASS INDEX: 38.36 KG/M2

## 2022-11-14 DIAGNOSIS — E66.01 MORBID OBESITY (H): Primary | ICD-10-CM

## 2022-11-14 DIAGNOSIS — E11.9 TYPE 2 DIABETES MELLITUS WITHOUT COMPLICATION, WITHOUT LONG-TERM CURRENT USE OF INSULIN (H): ICD-10-CM

## 2022-11-14 PROCEDURE — 99213 OFFICE O/P EST LOW 20 MIN: CPT | Mod: 95 | Performed by: INTERNAL MEDICINE

## 2022-11-14 ASSESSMENT — PAIN SCALES - GENERAL: PAINLEVEL: SEVERE PAIN (7)

## 2022-11-14 NOTE — LETTER
Date:November 21, 2022      Provider requested that no letter be sent. Do not send.       [facility]

## 2022-11-14 NOTE — NURSING NOTE
"(   Chief Complaint   Patient presents with     RECHECK     Return MW    )    ( Weight: (!) 250.8 kg (553 lb) (Patient reported) )  ( Height: 193 cm (6' 4\") )  ( BMI (Calculated): 67.31 )  (   )  (   )  (   )  (   )  (   )  (   )    (   )  (   )  (   )  (   )  (   )  (   )  (   )    (   Patient Active Problem List   Diagnosis     Morbid obesity (H)    )  (   Current Outpatient Medications   Medication Sig Dispense Refill     albuterol (PROVENTIL) (2.5 MG/3ML) 0.083% neb solution        apixaban ANTICOAGULANT (ELIQUIS) 2.5 MG tablet Take 2.5 mg by mouth       atorvastatin (LIPITOR) 10 MG tablet Take 10 mg by mouth       bumetanide (BUMEX) 1 MG tablet Take 1 mg by mouth       cholecalciferol 25 MCG (1000 UT) TABS Take 25 mcg by mouth       DULoxetine (CYMBALTA) 60 MG capsule TAKE 2 CAPSULES BY MOUTH ONCE DAILY       famotidine (PEPCID) 20 MG tablet Take 20 mg by mouth       gabapentin (NEURONTIN) 300 MG capsule Take 300 mg by mouth       hydrOXYzine (VISTARIL) 25 MG capsule Take 25 mg by mouth       insulin aspart (NOVOLOG VIAL) 100 UNITS/ML vial Inject 24 Units Subcutaneous       insulin glargine (LANTUS VIAL) 100 UNIT/ML vial Inject 47 Units Subcutaneous       insulin lispro (HUMALOG VIAL) 100 UNIT/ML vial Inject 2-10 Units Subcutaneous       lactulose (CHRONULAC) 10 GM/15ML solution Take 60 mLs by mouth       Magnesium Oxide 500 MG TABS Take 500 mg by mouth       melatonin 3 MG tablet Take 3 mg by mouth       mineral oil-hydrophilic petrolatum (AQUAPHOR) external ointment        Multiple Vitamins-Minerals (MULTI VITAMIN  /MINERALS) TABS Take 1 tablet by mouth       naloxone (NARCAN) 4 MG/0.1ML nasal spray Spray 1 spray (4 mg) into one nostril 1 time; may repeat in opposite nostril in 2 minutes if person does not respond.       pantoprazole (PROTONIX) 40 MG EC tablet Take 40 mg by mouth       senna-docusate (SENOKOT-S/PERICOLACE) 8.6-50 MG tablet Take 2 tablets by mouth       zolpidem (AMBIEN) 5 MG tablet Take 5 mg " by mouth       acetaminophen (TYLENOL) 500 MG tablet Take 1,000 mg by mouth (Patient not taking: Reported on 1/10/2022)       busPIRone (BUSPAR) 10 MG tablet Take 10 mg by mouth       topiramate (TOPAMAX) 25 MG tablet Take 25 mg by mouth      )  ( Diabetes Eval:    )    ( Pain Eval:  Severe Pain (7) )    ( Wound Eval:       )    (   History   Smoking Status     Never   Smokeless Tobacco     Never    )    ( Signed By:  Rea Multani, EMT; November 14, 2022; 10:16 AM )

## 2022-11-14 NOTE — LETTER
"2022       RE: Max Meade  [address]  [facility] [address]     Dear Colleague,    Thank you for referring your patient, Max Meade, to the [facility] WEIGHT MANAGEMENT [facility]INIC [place] at [facility]. Please see a copy of my visit note below.    Max is a 42 year old who is being evaluated via a billable video visit.      How would you like to obtain your AVS? MyChart  If the video visit is dropped, the invitation should be resent by: Text to cell phone: [phone]  Will anyone else be joining your video visit? No  During this virtual visit the patient is located in [place], patient verifies this as the location during the entirety of this visit.     Video-Visit Details  Joined the call at 2022, 11:33:39 am.  Left the call at 2022, 11:41:13 am.    Originating Location (pt. Location): Home        Distant Location (provider location):  Off-site    Platform used for Video Visit: [facility] Weight Management Note     Max Meade  MRN:  [id number]  :  1980  JOSEFINA:  22    Dear Colleagues,    I had the pleasure of seeing your patient Max Meade.  He is a 41 year old male who I am continuing to see for treatment of obesity related to:     2021   I have the following health issues associated with obesity: Type II Diabetes, Sleep Apnea   I have the following symptoms associated with obesity: Knee Pain, Depression, Lower Extremity Swelling, Back Pain, Fatigue     CURRENT WEIGHT:   553 lbs 0 oz     Wt Readings from Last 4 Encounters:   22 (!) 250.8 kg (553 lb)   22 (!) 261.7 kg (577 lb)   22 (!) 295.7 kg (652 lb)   01/10/22 (!) 297.6 kg (656 lb)     Height:  6' 4\"  Body Mass Index:  Body mass index is 67.31 kg/m .  Vitals:  B/P: Data Unavailable, P: Data Unavailable    Initial consult weight was 667 on down 11.  Weight change since last seen on 22 " is down 24 pounds.   Total loss is 114 pounds.    INTERVAL HISTORY:  Says following 1300 calories diet in hospital and denies outside food. Maintains semaglutide 2 mg/week. Topiramate is 75 mg BID.     Diet Recall Review with Patient 12/21/2021   Do you typically eat breakfast? Yes   If you do eat breakfast, what types of food do you eat? Martiniquais toast,butler, sausage, eggs   Do you typically eat lunch? Yes   If you do eat lunch, what types of food do you typically eat?  Tuna sandwich   Do you typically eat supper? Yes   If you do eat supper, what types of food do you typically eat? Chicken Lorida   Do you typically eat snacks? No   Do you like vegetables?  No   Do you drink water? Yes   How many glasses of juice do you drink in a typical day? 0   How many of glasses of milk do you drink in a typical day? 0   If you do drink milk, what type? N/A   How many 8oz glasses of sugar containing drinks such as Simba-Aid/sweet tea do you drink in a day? 0   How many cans/bottles of sugar pop/soda/tea/sports drinks do you drink in a day? 0   How many cans/bottles of diet pop/soda/tea or sports drink do you drink in a day? 7   How often do you have a drink of alcohol? Never     MEDICATIONS:   Current Outpatient Medications   Medication     albuterol (PROVENTIL) (2.5 MG/3ML) 0.083% neb solution     apixaban ANTICOAGULANT (ELIQUIS) 2.5 MG tablet     atorvastatin (LIPITOR) 10 MG tablet     bumetanide (BUMEX) 1 MG tablet     cholecalciferol 25 MCG (1000 UT) TABS     DULoxetine (CYMBALTA) 60 MG capsule     famotidine (PEPCID) 20 MG tablet     gabapentin (NEURONTIN) 300 MG capsule     hydrOXYzine (VISTARIL) 25 MG capsule     insulin aspart (NOVOLOG VIAL) 100 UNITS/ML vial     insulin glargine (LANTUS VIAL) 100 UNIT/ML vial     insulin lispro (HUMALOG VIAL) 100 UNIT/ML vial     lactulose (CHRONULAC) 10 GM/15ML solution     Magnesium Oxide 500 MG TABS     melatonin 3 MG tablet     mineral oil-hydrophilic petrolatum (AQUAPHOR) external  ointment     Multiple Vitamins-Minerals (MULTI VITAMIN  /MINERALS) TABS     naloxone (NARCAN) 4 MG/0.1ML nasal spray     pantoprazole (PROTONIX) 40 MG EC tablet     senna-docusate (SENOKOT-S/PERICOLACE) 8.6-50 MG tablet     zolpidem (AMBIEN) 5 MG tablet     acetaminophen (TYLENOL) 500 MG tablet     busPIRone (BUSPAR) 10 MG tablet     topiramate (TOPAMAX) 25 MG tablet     No current facility-administered medications for this visit.     Weight Loss Medication History Reviewed With Patient 11/13/2022   Which weight loss medications are you currently taking on a regular basis?  Ozempic   Are you having any side effects from the weight loss medication that we have prescribed you? Yes   If you are having side effects please describe: Just a little nauseous for a couple days     ASSESSMENT:   Maintain 1350 calories diet in hospital and semaglutide 2 mg/week and maintain topiramate 75 BID. He is hoping to get walking to qualify for barisurgery, doing regular PT.    FOLLOW-UP:    12 weeks.    Sincerely,  Erlin Hdz MD      Again, thank you for allowing me to participate in the care of your patient.      Sincerely,    Erlin Hdz MD

## 2022-11-14 NOTE — PROGRESS NOTES
"    Return Medical Weight Management Note     Max Meade  MRN:  [id number]  :  1980  JOSEFINA:  22    Dear Colleagues,    I had the pleasure of seeing your patient Max Meade.  He is a 41 year old male who I am continuing to see for treatment of obesity related to:     2021   I have the following health issues associated with obesity: Type II Diabetes, Sleep Apnea   I have the following symptoms associated with obesity: Knee Pain, Depression, Lower Extremity Swelling, Back Pain, Fatigue     CURRENT WEIGHT:   553 lbs 0 oz     Wt Readings from Last 4 Encounters:   22 (!) 250.8 kg (553 lb)   22 (!) 261.7 kg (577 lb)   22 (!) 295.7 kg (652 lb)   01/10/22 (!) 297.6 kg (656 lb)     Height:  6' 4\"  Body Mass Index:  Body mass index is 67.31 kg/m .  Vitals:  B/P: Data Unavailable, P: Data Unavailable    Initial consult weight was 667 on down 11.  Weight change since last seen on 22 is down 24 pounds.   Total loss is 114 pounds.    INTERVAL HISTORY:  Says following 1300 calories diet in hospital and denies outside food. Maintains semaglutide 2 mg/week. Topiramate is 75 mg BID.     Diet Recall Review with Patient 2021   Do you typically eat breakfast? Yes   If you do eat breakfast, what types of food do you eat? Turkmen toast,butler, sausage, eggs   Do you typically eat lunch? Yes   If you do eat lunch, what types of food do you typically eat?  Tuna sandwich   Do you typically eat supper? Yes   If you do eat supper, what types of food do you typically eat? Chicken [place]   Do you typically eat snacks? No   Do you like vegetables?  No   Do you drink water? Yes   How many glasses of juice do you drink in a typical day? 0   How many of glasses of milk do you drink in a typical day? 0   If you do drink milk, what type? N/A   How many 8oz glasses of sugar containing drinks such as Simba-Aid/sweet tea do you drink in a day? 0   How many cans/bottles of sugar " pop/soda/tea/sports drinks do you drink in a day? 0   How many cans/bottles of diet pop/soda/tea or sports drink do you drink in a day? 7   How often do you have a drink of alcohol? Never     MEDICATIONS:   Current Outpatient Medications   Medication     albuterol (PROVENTIL) (2.5 MG/3ML) 0.083% neb solution     apixaban ANTICOAGULANT (ELIQUIS) 2.5 MG tablet     atorvastatin (LIPITOR) 10 MG tablet     bumetanide (BUMEX) 1 MG tablet     cholecalciferol 25 MCG (1000 UT) TABS     DULoxetine (CYMBALTA) 60 MG capsule     famotidine (PEPCID) 20 MG tablet     gabapentin (NEURONTIN) 300 MG capsule     hydrOXYzine (VISTARIL) 25 MG capsule     insulin aspart (NOVOLOG VIAL) 100 UNITS/ML vial     insulin glargine (LANTUS VIAL) 100 UNIT/ML vial     insulin lispro (HUMALOG VIAL) 100 UNIT/ML vial     lactulose (CHRONULAC) 10 GM/15ML solution     Magnesium Oxide 500 MG TABS     melatonin 3 MG tablet     mineral oil-hydrophilic petrolatum (AQUAPHOR) external ointment     Multiple Vitamins-Minerals (MULTI VITAMIN  /MINERALS) TABS     naloxone (NARCAN) 4 MG/0.1ML nasal spray     pantoprazole (PROTONIX) 40 MG EC tablet     senna-docusate (SENOKOT-S/PERICOLACE) 8.6-50 MG tablet     zolpidem (AMBIEN) 5 MG tablet     acetaminophen (TYLENOL) 500 MG tablet     busPIRone (BUSPAR) 10 MG tablet     topiramate (TOPAMAX) 25 MG tablet     No current facility-administered medications for this visit.     Weight Loss Medication History Reviewed With Patient 11/13/2022   Which weight loss medications are you currently taking on a regular basis?  Ozempic   Are you having any side effects from the weight loss medication that we have prescribed you? Yes   If you are having side effects please describe: Just a little nauseous for a couple days     ASSESSMENT:   Maintain 1350 calories diet in hospital and semaglutide 2 mg/week and maintain topiramate 75 BID. He is hoping to get walking to qualify for barisurgery, doing regular PT.    FOLLOW-UP:    12  weeks.    Sincerely,  Erlin Hdz MD

## 2022-11-14 NOTE — PROGRESS NOTES
Max is a 42 year old who is being evaluated via a billable video visit.      How would you like to obtain your AVS? MyChart  If the video visit is dropped, the invitation should be resent by: Text to cell phone: [phone]  Will anyone else be joining your video visit? No  During this virtual visit the patient is located in [place], patient verifies this as the location during the entirety of this visit.     Video-Visit Details  Joined the call at 11/14/2022, 11:33:39 am.  Left the call at 11/14/2022, 11:41:13 am.    Originating Location (pt. Location): Home        Distant Location (provider location):  Off-site    Platform used for Video Visit: Eddy

## 2022-11-17 ENCOUNTER — VIRTUAL VISIT (OUTPATIENT)
Dept: ENDOCRINOLOGY | Facility: CLINIC | Age: 42
End: 2022-11-17
Payer: MEDICARE

## 2022-11-17 DIAGNOSIS — E11.9 TYPE 2 DIABETES MELLITUS WITHOUT COMPLICATION, WITHOUT LONG-TERM CURRENT USE OF INSULIN (H): ICD-10-CM

## 2022-11-17 DIAGNOSIS — E66.9 OBESITY: ICD-10-CM

## 2022-11-17 DIAGNOSIS — Z71.3 NUTRITIONAL COUNSELING: Primary | ICD-10-CM

## 2022-11-17 PROCEDURE — 97803 MED NUTRITION INDIV SUBSEQ: CPT | Mod: GA | Performed by: DIETITIAN, REGISTERED

## 2022-11-17 NOTE — PROGRESS NOTES
"Max Meade is a 42 year old male who is being evaluated via a billable video visit.      The patient has been notified of following:     \"This video visit will be conducted via a call between you and your physician/provider. We have found that certain health care needs can be provided without the need for an in-person physical exam.  This service lets us provide the care you need with a video conversation.  If a prescription is necessary we can send it directly to your pharmacy.  If lab work is needed we can place an order for that and you can then stop by our lab to have the test done at a later time.    Video visits are billed at different rates depending on your insurance coverage.  Please reach out to your insurance provider with any questions.    If during the course of the call the physician/provider feels a video visit is not appropriate, you will not be charged for this service.\"    Patient has given verbal consent for Video visit? Yes  How would you like to obtain your AVS? MyChart  Will anyone else be joining your video visit? No  {If patient encounters technical issues they should call [phone]      Video-Visit Details    Type of service:  Video Visit    Video Start Time: 10:23 AM  Video End Time: 10:55 AM    Originating Location (pt. Location): Home    Distant Location (provider location):  [facility] WE[facility]NAGEMENT [facility] [place]     Platform used for Video Visit: Wantr    During this virtual visit the patient is located in [place], patient verifies this as the location during the entirety of this visit.     Pt signed Medicare ABN form which is good for 1 year (8/29/22 - 8/29/23). Scanned into chart 8/30/22.      Weight Management Nutrition Consultation    Max Meade is a 42 year old male presents today for return weight management nutrition consultation.  Patient referred by Dr. Hdz on December 27, 2021.    Per surgery PA - Patient interested in weight loss surgery " "which we can discuss with surgeon when weight loss goal of 100 lbs lost met and patient mobile.    Patient with Co-morbidities of obesity including:  Type II DM yes (insulin dependant, managed inpatient currently. HgbA1c 9.7 at last check on 9/21/22)  Renal Failure no  Sleep apnea yes  Hypertension no   Dyslipidemia no  Joint pain yes  Back pain yes  GERD no     Anthropometrics:  He reports checking weight weekly    Current weight (pt report): 553 lbs (-16 lbs over the past 6 weeks, -114 lbs from initial weight of 667 lbs)    Weight history (per pt report):  3/28/22: 631 lbs   2/14/22: 648 lbs   2/23/22: 642 lbs   1/24/22: 648 lbs    Estimated body mass index is 81.19 kg/m  as calculated from the following:    Height as of an earlier encounter on 12/27/21: 1.93 m (6' 4\").    Weight as of an earlier encounter on 12/27/21: 302.5 kg (667 lb).    Per notes from [facility]:  12/13/21 (!) 293.8 kg (647 lb 12.8 oz)   12/08/21 (!) 287.9 kg (634 lb 12.8 oz)   09/27/21 (!) 292.2 kg (644 lb 3.2 oz)     Admitted in March 2021. Notes he started working on weight loss at this time. Weight per care everywhere:   321 kg (707 lb 10.8 oz) 03/18/2021        Medications for Weight Loss:  Ozempic, may be switching to Wegovy   Topiramate    NUTRITION HISTORY  See MD note for details.  Per questionnaire, strong emotional component to eating. Readiness and confidence to change, rated 10 (very ready).    Saw [facility] b[facility] RD 6 times, last visit 12/16/21.     Currently admitted (since March 2021) at [facility] in [facility] and working with inpatient RD on weight loss.     He is getting 3 meals and 2 snack per day from the hospital. Reports no outside foods.   Does not tolerate regular milk.     Jan 2022 -  He was following 1500 calories/day until 1/11, then started following 1350 calories/day. Repots no outside food from hospital meals and snacks. Rolling and moving better. Is able to stand for a " minute which he was not doing before. Doing arm movements while in bed. He is interested in weight loss surgery consult with [facility].    Feb 2022 -  Pt reports he is doing PT 4 days per week. States he continues to follow the 1350 calories per day. States he has been talking to his cousin more on the phone which is helping distract between meals/snacks. His cousin has been very supportive to him and he wants to be able to move to [place] where his cousin lives. States he is able to stick to the diet plan at the hospital without issues, denies consuming outside food.     April 2022 - He reports this month has been going well. States PT has been on hold, wants him to lose more weight first before walking. Exercise session 30 mins 4 times per week doing seated exercise bike, medicine ball lifting. Has a couch in room where they are doing exercises.  Has a chart in his room to cross off lbs as he is losing, encouraged by this. Has an image of an outfit he wants to fit one day that helps with motivation.     May 2022 - Not feeling as hungry before bed with higher protein/calorie pm snack. He c/o constipation, likely r/t high protein diet and unable to ambulate.  Doing exercise M-Th with PT, and some on the weekends on his own. Doing a seated peddle, medicine ball, weighted bar, leg kicks, marching in place, and dumb bells.     July 2022 - Notes holidays can be a challenge, felt urge to want to order Chinese take-out on the 4th and then decided it was not worth it. States his long-term wt goal: 220-230 lbs. He is now able to push himself in a wheel chair, and using hospital gym to exercise upper body. Will be able to work on walking in next couple weeks now wt in the 580s.     August 2022 - Doing stand to sits, side steps, and other leg strengthening activities, progressing well. Feels like he may be able to start walking this week. Weight continues to trend down. Keeping to 1400 ronna/day diet. May feel nauseous on tues/wed,  following the weekly Ozempic injections. Taking zofran, sprite zero, crackers as needed to cope with nausea. Feels like he forces himself to eat at times.     Sept 2022 - Making progress with walking, he reports he is able to walk backwards and sideways, has not been able to walk forward yet. He reports this is d/t knee pain/issues. Has been looking at post-op recipes resources to explore new meal ideas.      9/26/22 - Met with Ortho for knee pain and received corticosteroid injection.     Today - Finding shakes very helpful for weight loss. Reports not having shakes for a few days while they were out at the hospital. Once he restarted he reports losing 7 lbs.     1400 calories/day (30 gm pro/meal, <60 gm CHO/meal, <15 gm CHO/snack):  Breakfast: 10 am protein shake + english muffin + 1 pc sausage + egg   12 pm snack: 1/2 cup fresh fruit bowl (grapes, pineapple, strawberries and melon)  Lunch 2pm: protein shake with watermelon/grapes  4pm snack: 70 ronna popcorn (white cheddar)  Dinner: 6 pm Grilled cheese and ham and side chicken noodle soup and protein shake   10 pm: Greek yogurt and honey and string cheese - taking with medication   Beverages: water/crystal lite (5-6 x 8 oz/day), one coke zero daily.    Progress Towards Previous Goals:  1) Follow 1400 calorie/day plan - Met, continues    - Continue to replace 2 meals per day with low-calorie protein shake.  2) Aim for less than 60 gm carb per meal and less than 15 gm carb per snack - Met, continues   3) Eat very slowly, chewing thoroughly. You do not need to force eating on days you are feeling nauseas. Use protein shakes as meal replacement to help meet protein needs. High-fat foods may make your nausea worse. - Improving, states nausea has been a lot better. Notes feeling really hungry during work outs which are at 12:30 pm, does try to get in fruit before hand.   4) Work to maintain consistency with exercise sessions. Keep up with walking routine with PT. - Doing  PT at 12:30 pm 3-4 days per week. Monday does cardio on hand bike for 20 mins, Tuesday is leg day, does reps with ankle weights and pulley system, Wed is arm day, does arm bike then weight lifting with hand weights. Friday does games geared at building strength.     Physical Activity:  PT/OT and exercise 4 times per week for 30 mins and doing seated and now standing exercises.    Nutrition Prescription  Recommended energy/nutrient modification.  1500 calories/day or less (per MD)    Nutrition Diagnosis  Obesity r/t long history of positive energy balance aeb BMI >30. - improving/continues    Nutrition Intervention  Materials/education provided:  - Reviewed progress towards previous goals  - Reviewed dietary strategy for weight loss and management of type 2 DM - continuing hypocaloric and low/moderate carb meal plan as established. Praised pt on weight loss this past month and consistency with hypocaloric diet.  - Discussed need to be mobile for surgery. Encouraged consistency with exercise sessions and PT. Praised pt on the progress he has made with this.    - Discussed post-op diet guidelines and meal ideas.  - Answered pt nutrition related questions.      Patient demonstrates understanding.    Expected Engagement: good    Nutrition Goals  1) Follow 1400 calorie/day plan   - Continue to replace 2 meals per day with low-calorie protein shake.  2) Aim for less than 60 gm carb per meal and less than 15 gm carb per snack  3) Eat very slowly, chewing thoroughly. Take 20-30 mins to eat.   4) Work to maintain consistency with exercise sessions. Keep up with walking routine with PT.     Meal Replacement Shake Options:   *Protein Shake Criteria: no more than 210 Calories, at least 20 grams of protein, and less than 10 grams of sugar   [facility] smoothie (160 Calories, 20 g protein)   Premier Protein (160 Calories, 30 g protein)  Slim Fast Advanced Nutrition (180 Calories, 20 g protein)  Muscle Milk, lactose-free, 17 oz  bottle (210 Calories, 30 g protein)  Integrated Supplements, no artificial sugars (110 Calories, 20 g protein)  Genepro, unflavored protein powder (60 Calories, 30 g protein)  Boost/Ensure Max (160 calories, 30 gm protein)   SportyBird Core Power (170 calories, 26 gm protein)    Meal Replacement Bar Options:  [facility] Protein Shake (160 Calories, 15 g protein)  Quest Protein Bars (190 Calories, 20 g protein)  Built Bar (170 Calories, 15-20 g protein)  One Protein Bar (210 calories, 20 g protein)  Draper Signature Protein Bar ([employer]) (190 Calories, 21 g protein)  Pure Protein Bars (180 Calories, 21 g protein)      Protein Sources for Weight Loss  [email or web address]     Carbohydrates  [email or web address]     Mindful Eating  [email or web address]     Diet Guidelines after Weight Loss Surgery  [email or web address]     Seated Exercises for Arms and Legs (can be done before or after surgery)  [email or web address]      Follow-Up:  1-2 month, PRN    Time spent with patient: 22 minutes.  Blossom Caal RD, LD

## 2022-11-17 NOTE — LETTER
"11/17/2022       RE: Max Meade  [address]  [facility] [address]     Dear Colleague,    Thank you for referring your patient, Max Meade, to the [facility] [place] at [facility]. Please see a copy of my visit note below.    Max Meade is a 42 year old male who is being evaluated via a billable video visit.      The patient has been notified of following:     \"This video visit will be conducted via a call between you and your physician/provider. We have found that certain health care needs can be provided without the need for an in-person physical exam.  This service lets us provide the care you need with a video conversation.  If a prescription is necessary we can send it directly to your pharmacy.  If lab work is needed we can place an order for that and you can then stop by our lab to have the test done at a later time.    Video visits are billed at different rates depending on your insurance coverage.  Please reach out to your insurance provider with any questions.    If during the course of the call the physician/provider feels a video visit is not appropriate, you will not be charged for this service.\"    Patient has given verbal consent for Video visit? Yes  How would you like to obtain your AVS? MyChart  Will anyone else be joining your video visit? No  {If patient encounters technical issues they should call [phone]      Video-Visit Details    Type of service:  Video Visit    Video Start Time: 10:23 AM  Video End Time: 10:55 AM    Originating Location (pt. Location): Home    Distant Location (provider location):  [facility] [place]     Platform used for Video Visit: [facility] ([facility])    During this virtual visit the patient is located in [place], patient verifies this as the location during the entirety of this visit.     Pt signed Medicare ABN form which is " "good for 1 year (8/29/22 - 8/29/23). Scanned into chart 8/30/22.      Weight Management Nutrition Consultation    Max Meade is a 42 year old male presents today for return weight management nutrition consultation.  Patient referred by Dr. Hdz on December 27, 2021.    Per surgery PA - Patient interested in weight loss surgery which we can discuss with surgeon when weight loss goal of 100 lbs lost met and patient mobile.    Patient with Co-morbidities of obesity including:  Type II DM yes (insulin dependant, managed inpatient currently. HgbA1c 9.7 at last check on 9/21/22)  Renal Failure no  Sleep apnea yes  Hypertension no   Dyslipidemia no  Joint pain yes  Back pain yes  GERD no     Anthropometrics:  He reports checking weight weekly    Current weight (pt report): 553 lbs (-16 lbs over the past 6 weeks, -114 lbs from initial weight of 667 lbs)    Weight history (per pt report):  3/28/22: 631 lbs   2/14/22: 648 lbs   2/23/22: 642 lbs   1/24/22: 648 lbs    Estimated body mass index is 81.19 kg/m  as calculated from the following:    Height as of an earlier encounter on 12/27/21: 1.93 m (6' 4\").    Weight as of an earlier encounter on 12/27/21: 302.5 kg (667 lb).    Per notes from [facility] RD:  12/13/21 (!) 293.8 kg (647 lb 12.8 oz)   12/08/21 (!) 287.9 kg (634 lb 12.8 oz)   09/27/21 (!) 292.2 kg (644 lb 3.2 oz)     Admitted in March 2021. Notes he started working on weight loss at this time. Weight per care everywhere:   321 kg (707 lb 10.8 oz) 03/18/2021        Medications for Weight Loss:  Ozempic, may be switching to Wegovy   Topiramate    NUTRITION HISTORY  See MD note for details.  Per questionnaire, strong emotional component to eating. Readiness and confidence to change, rated 10 (very ready).    Saw [facility] bariat[facility] RD 6 times, last visit 12/16/21.     Currently admitted (since March 2021) at [facility] in [place] and working with inpatient RD on weight loss. "     He is getting 3 meals and 2 snack per day from the hospital. Reports no outside foods.   Does not tolerate regular milk.     Jan 2022 -  He was following 1500 calories/day until 1/11, then started following 1350 calories/day. Repots no outside food from hospital meals and snacks. Rolling and moving better. Is able to stand for a minute which he was not doing before. Doing arm movements while in bed. He is interested in weight loss surgery consult with [facility].    Feb 2022 -  Pt reports he is doing PT 4 days per week. States he continues to follow the 1350 calories per day. States he has been talking to his cousin more on the phone which is helping distract between meals/snacks. His cousin has been very supportive to him and he wants to be able to move to [place] where his cousin lives. States he is able to stick to the diet plan at the hospital without issues, denies consuming outside food.     April 2022 - He reports this month has been going well. States PT has been on hold, wants him to lose more weight first before walking. Exercise session 30 mins 4 times per week doing seated exercise bike, medicine ball lifting. Has a couch in room where they are doing exercises.  Has a chart in his room to cross off lbs as he is losing, encouraged by this. Has an image of an outfit he wants to fit one day that helps with motivation.     May 2022 - Not feeling as hungry before bed with higher protein/calorie pm snack. He c/o constipation, likely r/t high protein diet and unable to ambulate.  Doing exercise M-Th with PT, and some on the weekends on his own. Doing a seated peddle, medicine ball, weighted bar, leg kicks, marching in place, and dumb bells.     July 2022 - Notes holidays can be a challenge, felt urge to want to order Chinese take-out on the 4th and then decided it was not worth it. States his long-term wt goal: 220-230 lbs. He is now able to push himself in a wheel chair, and using hospital gym to exercise upper  body. Will be able to work on walking in next couple weeks now wt in the 580s.     August 2022 - Doing stand to sits, side steps, and other leg strengthening activities, progressing well. Feels like he may be able to start walking this week. Weight continues to trend down. Keeping to 1400 ronna/day diet. May feel nauseous on tues/wed, following the weekly Ozempic injections. Taking zofran, sprite zero, crackers as needed to cope with nausea. Feels like he forces himself to eat at times.     Sept 2022 - Making progress with walking, he reports he is able to walk backwards and sideways, has not been able to walk forward yet. He reports this is d/t knee pain/issues. Has been looking at post-op recipes resources to explore new meal ideas.      9/26/22 - Met with Ortho for knee pain and received corticosteroid injection.     Today - Finding shakes very helpful for weight loss. Reports not having shakes for a few days while they were out at the hospital. Once he restarted he reports losing 7 lbs.     1400 calories/day (30 gm pro/meal, <60 gm CHO/meal, <15 gm CHO/snack):  Breakfast: 10 am protein shake + english muffin + 1 pc sausage + egg   12 pm snack: 1/2 cup fresh fruit bowl (grapes, pineapple, strawberries and melon)  Lunch 2pm: protein shake with watermelon/grapes  4pm snack: 70 ronna popcorn (white cheddar)  Dinner: 6 pm Grilled cheese and ham and side chicken noodle soup and protein shake   10 pm: Greek yogurt and honey and string cheese - taking with medication   Beverages: water/crystal lite (5-6 x 8 oz/day), one coke zero daily.    Progress Towards Previous Goals:  1) Follow 1400 calorie/day plan - Met, continues    - Continue to replace 2 meals per day with low-calorie protein shake.  2) Aim for less than 60 gm carb per meal and less than 15 gm carb per snack - Met, continues   3) Eat very slowly, chewing thoroughly. You do not need to force eating on days you are feeling nauseas. Use protein shakes as meal  replacement to help meet protein needs. High-fat foods may make your nausea worse. - Improving, states nausea has been a lot better. Notes feeling really hungry during work outs which are at 12:30 pm, does try to get in fruit before hand.   4) Work to maintain consistency with exercise sessions. Keep up with walking routine with PT. - Doing PT at 12:30 pm 3-4 days per week. Monday does cardio on hand bike for 20 mins, Tuesday is leg day, does reps with ankle weights and pulley system, Wed is arm day, does arm bike then weight lifting with hand weights. Friday does games geared at building strength.     Physical Activity:  PT/OT and exercise 4 times per week for 30 mins and doing seated and now standing exercises.    Nutrition Prescription  Recommended energy/nutrient modification.  1500 calories/day or less (per MD)    Nutrition Diagnosis  Obesity r/t long history of positive energy balance aeb BMI >30. - improving/continues    Nutrition Intervention  Materials/education provided:  - Reviewed progress towards previous goals  - Reviewed dietary strategy for weight loss and management of type 2 DM - continuing hypocaloric and low/moderate carb meal plan as established. Praised pt on weight loss this past month and consistency with hypocaloric diet.  - Discussed need to be mobile for surgery. Encouraged consistency with exercise sessions and PT. Praised pt on the progress he has made with this.    - Discussed post-op diet guidelines and meal ideas.  - Answered pt nutrition related questions.      Patient demonstrates understanding.    Expected Engagement: good    Nutrition Goals  1) Follow 1400 calorie/day plan   - Continue to replace 2 meals per day with low-calorie protein shake.  2) Aim for less than 60 gm carb per meal and less than 15 gm carb per snack  3) Eat very slowly, chewing thoroughly. Take 20-30 mins to eat.   4) Work to maintain consistency with exercise sessions. Keep up with walking routine with PT.      Meal Replacement Shake Options:   *Protein Shake Criteria: no more than 210 Calories, at least 20 grams of protein, and less than 10 grams of sugar   [facility] smoothie (160 Calories, 20 g protein)   Premier Protein (160 Calories, 30 g protein)  Slim Fast Advanced Nutrition (180 Calories, 20 g protein)  Muscle Milk, lactose-free, 17 oz bottle (210 Calories, 30 g protein)  Integrated Supplements, no artificial sugars (110 Calories, 20 g protein)  Genepro, unflavored protein powder (60 Calories, 30 g protein)  Boost/Ensure Max (160 calories, 30 gm protein)   [employer] [facility] [employer] Core Power (170 calories, 26 gm protein)    Meal Replacement Bar Options:  [facility] Protein Shake (160 Calories, 15 g protein)  Quest Protein Bars (190 Calories, 20 g protein)  Built Bar (170 Calories, 15-20 g protein)  One Protein Bar (210 calories, 20 g protein)  [place] Signature Protein Bar ([employer]) (190 Calories, 21 g protein)  Pure Protein Bars (180 Calories, 21 g protein)      Protein Sources for Weight Loss  [email or web address]     Carbohydrates  [email or web address]     Mindful Eating  [email or web address]     Diet Guidelines after Weight Loss Surgery  [email or web address]     Seated Exercises for Arms and Legs (can be done before or after surgery)  [email or web address]      Follow-Up:  1-2 month, PRN    Time spent with patient: 22 minutes.  Blossom Caal RD, LD      Again, thank you for allowing me to participate in the care of your patient.      Sincerely,    Blossom Caal RD

## 2022-11-30 ENCOUNTER — CARE COORDINATION (OUTPATIENT)
Dept: ENDOCRINOLOGY | Facility: CLINIC | Age: 42
End: 2022-11-30

## 2022-11-30 NOTE — PROGRESS NOTES
Called patient to remind him of his video visit with Sherice Schmid tomorrow. I reminded him to do his questionnaire as well. He acknowledged this.     Rea Multani, EMT

## 2022-12-01 ENCOUNTER — VIRTUAL VISIT (OUTPATIENT)
Dept: ENDOCRINOLOGY | Facility: CLINIC | Age: 42
End: 2022-12-01
Payer: MEDICARE

## 2022-12-01 VITALS — HEIGHT: 76 IN | BODY MASS INDEX: 38.36 KG/M2 | WEIGHT: 315 LBS

## 2022-12-01 DIAGNOSIS — E11.9 TYPE 2 DIABETES MELLITUS WITHOUT COMPLICATION, WITHOUT LONG-TERM CURRENT USE OF INSULIN (H): ICD-10-CM

## 2022-12-01 DIAGNOSIS — E66.01 MORBID OBESITY (H): Primary | ICD-10-CM

## 2022-12-01 PROCEDURE — 99203 OFFICE O/P NEW LOW 30 MIN: CPT | Mod: 95 | Performed by: PHYSICIAN ASSISTANT

## 2022-12-01 ASSESSMENT — PAIN SCALES - GENERAL: PAINLEVEL: MODERATE PAIN (5)

## 2022-12-01 NOTE — LETTER
"2022       RE: aMx Meade  [address] [facility] [address]  [facility] [address]     Dear Colleague,    Thank you for referring your patient, Max Meade, to the [facility] [place] at [facility]. Please see a copy of my visit note below.    Max is a 42 year old who is being evaluated via a billable video visit.      How would you like to obtain your AVS? MyChart  If the video visit is dropped, the invitation should be resent by: Text to cell phone: [phone]  Will anyone else be joining your video visit? No    During this virtual visit the patient is located in [place], patient verifies this as the location during the entirety of this visit.     Video-Visit Details    Video Start Time: 159    Type of service:  Video Visit    Video End Time:235    Originating Location (pt. Location): Home    Distant Location (provider location):  Off-site    Platform used for Video Visit: [employer]     40 minutes spent on the date of the encounter doing chart review, history and exam, documentation and further activities per the note    New Bariatric Surgery Consultation Note    2022    RE: Max Meade  MR#: [id number]  : 1980    Referring provider:       2022   Who referred you? Dr Duval       Chief Complaint/Reason for visit: evaluation for possible weight loss surgery    Dear No Ref-Primary, Physician (General),    I had the pleasure of seeing your patient, Max Meade, to evaluate his obesity and consider him for possible weight loss surgery.  As you know, Max Meade is 42 year old.  He has a height of 6' 4\", a weight of 547 lbs 0 oz, and calculated Body mass index is 66.58 kg/m ..  Full intake/assessment was done to determine barriers to weight loss success and develop a treatment plan.  Uses bed scale. Still at swing bed at a hospital in [place] since hospitalization for " pneumonia and fall in Feb 2021     Highest wt in life 711 lbs  Initial weight with medical wt mgmt Dr Hdz 667 lbs. Started working with us in Dec 2021 with hopes of having bariatric surgery. >100 lb weight loss required for possible bariatric surgery.   Has lost to 547 lbs  Ozempic 2mg weekly  Topiramate 75mg BID    Doing PT and working on mobility. He is using a walker and working on transfers. Walking is up to 34 feet.  Working on transferring off toilet before going home.  Working on gaining strength.  A1C 9.7 2 months around the time of cortisone injection. Knows it needs to be <8 for surgery.  Cortisone injection helped a bit.    Personal goal weight: 240 lbs. But aware this may not be realistic    Support system:     Eating 1300 ronna food plan. Reports not ordering food  Ozempic 2mg weekly some nausea the first couple days.  Topiramate 75mg twice daily    Support system:   Lives in [place]  Plans to move back to [place] sometime after bariatric surgery.  Cousin planning to come help after surgery. Will also have home health aide.  He came to [place] for a job and worked for [employer]. Last worked 4 years ago. Started gaining weight more 4 years ago after episode of depression and relationship issue. Prior he has been in the 400's.  Still has an apartment.    Mental health:   Depression improving. Takes cymbalta  Agoraphobia improved  Sees a therapist weekly      Assessment & Plan   Problem List Items Addressed This Visit        Endocrine Diagnoses    Morbid obesity (H) - Primary    Relevant Orders    CBC with platelets    Comprehensive metabolic panel    Hemoglobin A1c    Vitamin D Deficiency    Parathyroid Hormone Intact    Lipid panel reflex to direct LDL Fasting   Other Visit Diagnoses     Type 2 diabetes mellitus without complication, without long-term current use of insulin (H)        Relevant Orders    CBC with platelets    Comprehensive metabolic panel    Hemoglobin A1c    Vitamin D Deficiency    Parathyroid  Hormone Intact    Lipid panel reflex to direct LDL Fasting         Tasklist given to patient  Need to establish Primary Care Provider when discharged from swing bed  Need to see Dr Campos in person on a Thursday in clinic after psych eval complete and more mobile  Will need to discuss logistics of care after bariatric surgery. Family from [place] possibly coming to help. ? TCU after surgery discharge?  Call to schedule psych eval Jadyn Gonzalez  Need letter of support from therapist   Need sleep clearance, uses CPAP regularly.  Follow up with local sleep clinic  Need labs, will fax to [facility] up  monthly  Follow up Sherice in 3 months video visit    HISTORY OF PRESENT ILLNESS:  Weight Loss History Reviewed with Patient 11/30/2022   How long have you been overweight? Since late teens through early 20's   What is the most that you have ever weighed? 711   What is the most weight you have lost? 164   I have tried the following methods to lose weight Physician directed program   I have tried the following weight loss medications? (Check all that apply) Topamax/Topiramate, Qysmia (phentermine + topiramate), Victoza/liraglutide   Have you ever had weight loss surgery? No       CO-MORBIDITIES OF OBESITY INCLUDE:     11/30/2022   I have the following health issues associated with obesity: Type II Diabetes, Sleep Apnea   I have the following symptoms associated with obesity: -   Uses CPAP. Last saw sleep clinic a long time ago. Since losing weight doesn't snore.     Is patient on biologics or immunomodulators?     PAST SURGICAL HISTORY:  No past surgical history on file.    FAMILY HISTORY:   No family history on file.    SOCIAL HISTORY:   Social History Questions Reviewed With Patient 11/30/2022   Which best describes your employment status (select all that apply) I am disabled   Which best describes your marital status: single   Do you have children? No   Who can you count on for support throughout your weight loss surgery  journey? Yes   Can you afford 3 meals a day?  Yes   Can you afford 50-60 dollars a month for vitamins? Yes       HABITS:     11/30/2022   How often do you drink alcohol? Never   Have you ever used any of the following nicotine products? E-cigarettes   If you previously used any of these products, what year did you quit? 2020   Have you or are you currently using street drugs or prescription strength medication for which you do not have a prescription for? No   Do you have a history of chemical dependency (alcohol or drug abuse)? No     Currently taking narcotic/opioids No    PSYCHOLOGICAL HISTORY:   Psychological History Reviewed With Patient 11/30/2022   Have you ever attempted suicide? Never.   Have you had thoughts of suicide in the past year? No   Have you ever been hospitalized for mental illness or a suicide attempt? Never.   Do you have a history of chronic pain? No   Have you ever been diagnosed with fibromyalgia? No   Are you currently being treated for any of the following? (select all that apply) Depression, Anxiety, I take medication or see a therapist for another mental illness   Are you currently seeing a therapist or counselor?  Yes   Are you currently seeing a psychiatrist? Yes       ROS:     11/30/2022   Skin:  Skin fold rashes (groin or other folds)   HEENT: None of these   Musculoskeletal: Joint Pain, Limited mobility   Cardiovascular: Shortness of breath with activity   Pulmonary: Shortness of breath with activity, Snoring   Gastrointestinal: None of the above   Genitourinary: None of the above   Hematological: None of the above   Neurological: None of the above       EATING BEHAVIORS:     11/30/2022   Have you or anyone else thought that you had an eating disorder? No   Do you currently binge eat (eat a large amount of food in a short time)? No   Are you an emotional eater? No   Do you get up to eat after falling asleep? No       EXERCISE:     11/30/2022   How often do you exercise? Daily   What  is the duration of your exercise (in minutes)? 30 Minutes   What types of exercise do you do? gym membership, weightlifting, other   What keeps you from being more active?  Pain       MEDICATIONS:  Current Outpatient Medications   Medication Sig Dispense Refill     albuterol (PROVENTIL) (2.5 MG/3ML) 0.083% neb solution        apixaban ANTICOAGULANT (ELIQUIS) 2.5 MG tablet Take 2.5 mg by mouth       atorvastatin (LIPITOR) 10 MG tablet Take 10 mg by mouth       bumetanide (BUMEX) 1 MG tablet Take 1 mg by mouth       cholecalciferol 25 MCG (1000 UT) TABS Take 25 mcg by mouth       DULoxetine (CYMBALTA) 60 MG capsule TAKE 2 CAPSULES BY MOUTH ONCE DAILY       famotidine (PEPCID) 20 MG tablet Take 20 mg by mouth       gabapentin (NEURONTIN) 300 MG capsule Take 300 mg by mouth       hydrOXYzine (VISTARIL) 25 MG capsule Take 25 mg by mouth       insulin aspart (NOVOLOG VIAL) 100 UNITS/ML vial Inject 24 Units Subcutaneous       insulin glargine (LANTUS VIAL) 100 UNIT/ML vial Inject 47 Units Subcutaneous       insulin lispro (HUMALOG VIAL) 100 UNIT/ML vial Inject 2-10 Units Subcutaneous       lactulose (CHRONULAC) 10 GM/15ML solution Take 60 mLs by mouth       Magnesium Oxide 500 MG TABS Take 500 mg by mouth       melatonin 3 MG tablet Take 3 mg by mouth       mineral oil-hydrophilic petrolatum (AQUAPHOR) external ointment        Multiple Vitamins-Minerals (MULTI VITAMIN  /MINERALS) TABS Take 1 tablet by mouth       naloxone (NARCAN) 4 MG/0.1ML nasal spray Spray 1 spray (4 mg) into one nostril 1 time; may repeat in opposite nostril in 2 minutes if person does not respond.       pantoprazole (PROTONIX) 40 MG EC tablet Take 40 mg by mouth       senna-docusate (SENOKOT-S/PERICOLACE) 8.6-50 MG tablet Take 2 tablets by mouth       zolpidem (AMBIEN) 5 MG tablet Take 5 mg by mouth       acetaminophen (TYLENOL) 500 MG tablet Take 1,000 mg by mouth (Patient not taking: Reported on 1/10/2022)       busPIRone (BUSPAR) 10 MG tablet Take  10 mg by mouth       topiramate (TOPAMAX) 25 MG tablet Take 25 mg by mouth         ALLERGIES:  No Known Allergies    No results found for any previous visit.       PHYSICAL EXAM:  Objective     Physical Exam   GENERAL:  Alert and no distress  EYES: Eyes grossly normal to inspection.  No discharge or erythema, or obvious scleral/conjunctival abnormalities.  RESP: No audible wheeze, cough, or visible cyanosis.  No visible retractions or increased work of breathing.    SKIN: Visible skin clear. No significant rash, abnormal pigmentation or lesions.  NEURO: Cranial nerves grossly intact.  Mentation and speech appropriate for age.  PSYCH: Mentation appears normal, affect normal/bright, judgement and insight intact, normal speech and appearance well-groomed.      In summary, Max Meade has Class III obesity with a body mass index of Body mass index is 66.58 kg/m . kg/m2 and the comorbidities stated above. He completed an informational seminar and is a possible candidate for the laparoscopic gastric sleeve.  He will have to complete the following pre-requisites:      If you have not already watched our online seminar please go to [email or web address]    Weight loss requirement: 100 lbs prior to surgery. Will have final weight check 2-3 weeks prior to surgery at anesthesia or nurse pre-op teaching visit.    -Need current weight confirmation at primary clinic or weight management clinic Yes    Bariatric labs ordered, call for a lab only appointment at any [facility] lab. To find a lab location near you, please call [phone]. Please let us know if orders need to be faxed to a non [facility] lab.    Schedule bariatric psych eval as soon as possible.  List of psychologists will be sent to you via [employer] or given to you in clinic.     Call Itz Ortega at [phone] to discuss insurance coverage for bariatric surgery.  Please check with your insurance regarding bariatric surgery coverage  also. Itz can also help you with scheduling psych eval if you are having difficulties.    The following clearance letters are needed: Letters will be sent to you via [employer] or given to you in clinic  - Letter of support from primary care provider. Provider can submit through electronic medical record or fax to [phone]      NEXT VISITS: A  should reach out to you to schedule the following appointments.  If they do not reach you please call [phone] to schedule the following appointments:    -See dietitian in 1 month and monthly for 3 months    -See Sherice in 3 months to follow up on pre-op weight loss and weight loss medications    -See Dr Campos after psych and more mobile.  See in person on a Thursday in clinic.  [phone] to schedule.       Today in the office we discussed gastric sleeve surgery. Preoperative, perioperative, and postoperative processes, management, and follow up were addressed.  Risks and benefits were outlined including the risk of death, staple line leak (1-2%), PE, DVT, ulcer, worsening GERD, N/V, stricture, hernia, wound infection, weight regain, and vitamin deficiencies. I emphasized exercise and activity along with appropriate food choice as the main foundation for weight loss with surgery providing surgical reinforcement of this.  All questions were answered.  A goal sheet and support group handout were given to the patient.      Once the patient has completed the requirements in their task list and there are no further recommendations, the pt will be allowed to see the surgeon of her choice for consultation on the laparoscopic gastric sleeve surgery. Patient verbalizes understanding of the process to surgery and expectations for the postoperative period including the need for lifelong lifestyle changes, vitamin supplementation, and laboratory monitoring.    Sincerely,     Sherice Schmid PA-C              Again, thank you for allowing me to participate in the  care of your patient.      Sincerely,    Sherice Schmid PA-C

## 2022-12-01 NOTE — NURSING NOTE
"(   Chief Complaint   Patient presents with     New Patient     New pravin    )    ( Weight: (!) 248.1 kg (547 lb) (Patient reported) )  ( Height: 193 cm (6' 4\") )  ( BMI (Calculated): 66.58 )  (   )  (   )  (   )  (   )  (   )  (   )    (   )  (   )  (   )  (   )  (   )  (   )  (   )    (   Patient Active Problem List   Diagnosis     Morbid obesity (H)    )  (   Current Outpatient Medications   Medication Sig Dispense Refill     albuterol (PROVENTIL) (2.5 MG/3ML) 0.083% neb solution        apixaban ANTICOAGULANT (ELIQUIS) 2.5 MG tablet Take 2.5 mg by mouth       atorvastatin (LIPITOR) 10 MG tablet Take 10 mg by mouth       bumetanide (BUMEX) 1 MG tablet Take 1 mg by mouth       cholecalciferol 25 MCG (1000 UT) TABS Take 25 mcg by mouth       DULoxetine (CYMBALTA) 60 MG capsule TAKE 2 CAPSULES BY MOUTH ONCE DAILY       famotidine (PEPCID) 20 MG tablet Take 20 mg by mouth       gabapentin (NEURONTIN) 300 MG capsule Take 300 mg by mouth       hydrOXYzine (VISTARIL) 25 MG capsule Take 25 mg by mouth       insulin aspart (NOVOLOG VIAL) 100 UNITS/ML vial Inject 24 Units Subcutaneous       insulin glargine (LANTUS VIAL) 100 UNIT/ML vial Inject 47 Units Subcutaneous       insulin lispro (HUMALOG VIAL) 100 UNIT/ML vial Inject 2-10 Units Subcutaneous       lactulose (CHRONULAC) 10 GM/15ML solution Take 60 mLs by mouth       Magnesium Oxide 500 MG TABS Take 500 mg by mouth       melatonin 3 MG tablet Take 3 mg by mouth       mineral oil-hydrophilic petrolatum (AQUAPHOR) external ointment        Multiple Vitamins-Minerals (MULTI VITAMIN  /MINERALS) TABS Take 1 tablet by mouth       naloxone (NARCAN) 4 MG/0.1ML nasal spray Spray 1 spray (4 mg) into one nostril 1 time; may repeat in opposite nostril in 2 minutes if person does not respond.       pantoprazole (PROTONIX) 40 MG EC tablet Take 40 mg by mouth       senna-docusate (SENOKOT-S/PERICOLACE) 8.6-50 MG tablet Take 2 tablets by mouth       zolpidem (AMBIEN) 5 MG tablet Take 5 " mg by mouth       acetaminophen (TYLENOL) 500 MG tablet Take 1,000 mg by mouth (Patient not taking: Reported on 1/10/2022)       busPIRone (BUSPAR) 10 MG tablet Take 10 mg by mouth       topiramate (TOPAMAX) 25 MG tablet Take 25 mg by mouth      )  ( Diabetes Eval:    )    ( Pain Eval:  Moderate Pain (5) )    ( Wound Eval:       )    (   History   Smoking Status     Never   Smokeless Tobacco     Never    )    ( Signed By:  Rea Multani, EMT; December 1, 2022; 11:04 AM )

## 2022-12-01 NOTE — LETTER
Date:December 2, 2022      Provider requested that no letter be sent. Do not send.       [facility]

## 2022-12-01 NOTE — PROGRESS NOTES
"Max is a 42 year old who is being evaluated via a billable video visit.      How would you like to obtain your AVS? MyChart  If the video visit is dropped, the invitation should be resent by: Text to cell phone: [phone]  Will anyone else be joining your video visit? No    During this virtual visit the patient is located in [place], patient verifies this as the location during the entirety of this visit.     Video-Visit Details    Video Start Time: 159    Type of service:  Video Visit    Video End Time:235    Originating Location (pt. Location): Home    Distant Location (provider location):  Off-site    Platform used for Video Visit: SueEasy     40 minutes spent on the date of the encounter doing chart review, history and exam, documentation and further activities per the note    New Bariatric Surgery Consultation Note    2022    RE: Max Meade  MR#: [id number]  : 1980    Referring provider:       2022   Who referred you? Dr Duval       Chief Complaint/Reason for visit: evaluation for possible weight loss surgery    Dear No Ref-Primary, Physician (General),    I had the pleasure of seeing your patient, Max Meade, to evaluate his obesity and consider him for possible weight loss surgery.  As you know, Max Meade is 42 year old.  He has a height of 6' 4\", a weight of 547 lbs 0 oz, and calculated Body mass index is 66.58 kg/m ..  Full intake/assessment was done to determine barriers to weight loss success and develop a treatment plan.  Uses bed scale. Still at swing bed at a hospital in [place] since hospitalization for pneumonia and fall in 2021     Highest wt in life 711 lbs  Initial weight with medical wt mgmt Dr Hdz 667 lbs. Started working with us in Dec 2021 with hopes of having bariatric surgery. >100 lb weight loss required for possible bariatric surgery.   Has lost to 547 lbs  Ozempic 2mg weekly  Topiramate 75mg BID    Doing PT and working " on mobility. He is using a walker and working on transfers. Walking is up to 34 feet.  Working on transferring off toilet before going home.  Working on gaining strength.  A1C 9.7 2 months around the time of cortisone injection. Knows it needs to be <8 for surgery.  Cortisone injection helped a bit.    Personal goal weight: 240 lbs. But aware this may not be realistic    Support system:     Eating 1300 ronna food plan. Reports not ordering food  Ozempic 2mg weekly some nausea the first couple days.  Topiramate 75mg twice daily    Support system:   Lives in [place]  Plans to move back to [place] sometime after bariatric surgery.  Cousin planning to come help after surgery. Will also have home health aide.  He came to [place] for a job and worked for [employer]. Last worked 4 years ago. Started gaining weight more 4 years ago after episode of depression and relationship issue. Prior he has been in the 400's.  Still has an apartment.    Mental health:   Depression improving. Takes cymbalta  Agoraphobia improved  Sees a therapist weekly      Assessment & Plan   Problem List Items Addressed This Visit        Endocrine Diagnoses    Morbid obesity (H) - Primary    Relevant Orders    CBC with platelets    Comprehensive metabolic panel    Hemoglobin A1c    Vitamin D Deficiency    Parathyroid Hormone Intact    Lipid panel reflex to direct LDL Fasting   Other Visit Diagnoses     Type 2 diabetes mellitus without complication, without long-term current use of insulin (H)        Relevant Orders    CBC with platelets    Comprehensive metabolic panel    Hemoglobin A1c    Vitamin D Deficiency    Parathyroid Hormone Intact    Lipid panel reflex to direct LDL Fasting         Tasklist given to patient  Need to establish Primary Care Provider when discharged from swing bed  Need to see Dr Campos in person on a Thursday in clinic after psych eval complete and more mobile  Will need to discuss logistics of care after bariatric surgery. Family from [place]  possibly coming to help. ? TCU after surgery discharge?  Call to schedule psych selina Jadyn Gonzalez  Need letter of support from therapist   Need sleep clearance, uses CPAP regularly.  Follow up with local sleep clinic  Need labs, will fax to [facility] up  monthly  Follow up Sherice in 3 months video visit    HISTORY OF PRESENT ILLNESS:  Weight Loss History Reviewed with Patient 11/30/2022   How long have you been overweight? Since late teens through early 20's   What is the most that you have ever weighed? 711   What is the most weight you have lost? 164   I have tried the following methods to lose weight Physician directed program   I have tried the following weight loss medications? (Check all that apply) Topamax/Topiramate, Qysmia (phentermine + topiramate), Victoza/liraglutide   Have you ever had weight loss surgery? No       CO-MORBIDITIES OF OBESITY INCLUDE:     11/30/2022   I have the following health issues associated with obesity: Type II Diabetes, Sleep Apnea   I have the following symptoms associated with obesity: -   Uses CPAP. Last saw sleep clinic a long time ago. Since losing weight doesn't snore.     Is patient on biologics or immunomodulators?     PAST SURGICAL HISTORY:  No past surgical history on file.    FAMILY HISTORY:   No family history on file.    SOCIAL HISTORY:   Social History Questions Reviewed With Patient 11/30/2022   Which best describes your employment status (select all that apply) I am disabled   Which best describes your marital status: single   Do you have children? No   Who can you count on for support throughout your weight loss surgery journey? Yes   Can you afford 3 meals a day?  Yes   Can you afford 50-60 dollars a month for vitamins? Yes       HABITS:     11/30/2022   How often do you drink alcohol? Never   Have you ever used any of the following nicotine products? E-cigarettes   If you previously used any of these products, what year did you quit? 2020   Have you or  are you currently using street drugs or prescription strength medication for which you do not have a prescription for? No   Do you have a history of chemical dependency (alcohol or drug abuse)? No     Currently taking narcotic/opioids No    PSYCHOLOGICAL HISTORY:   Psychological History Reviewed With Patient 11/30/2022   Have you ever attempted suicide? Never.   Have you had thoughts of suicide in the past year? No   Have you ever been hospitalized for mental illness or a suicide attempt? Never.   Do you have a history of chronic pain? No   Have you ever been diagnosed with fibromyalgia? No   Are you currently being treated for any of the following? (select all that apply) Depression, Anxiety, I take medication or see a therapist for another mental illness   Are you currently seeing a therapist or counselor?  Yes   Are you currently seeing a psychiatrist? Yes       ROS:     11/30/2022   Skin:  Skin fold rashes (groin or other folds)   HEENT: None of these   Musculoskeletal: Joint Pain, Limited mobility   Cardiovascular: Shortness of breath with activity   Pulmonary: Shortness of breath with activity, Snoring   Gastrointestinal: None of the above   Genitourinary: None of the above   Hematological: None of the above   Neurological: None of the above       EATING BEHAVIORS:     11/30/2022   Have you or anyone else thought that you had an eating disorder? No   Do you currently binge eat (eat a large amount of food in a short time)? No   Are you an emotional eater? No   Do you get up to eat after falling asleep? No       EXERCISE:     11/30/2022   How often do you exercise? Daily   What is the duration of your exercise (in minutes)? 30 Minutes   What types of exercise do you do? gym membership, weightlifting, other   What keeps you from being more active?  Pain       MEDICATIONS:  Current Outpatient Medications   Medication Sig Dispense Refill     albuterol (PROVENTIL) (2.5 MG/3ML) 0.083% neb solution        apixaban  ANTICOAGULANT (ELIQUIS) 2.5 MG tablet Take 2.5 mg by mouth       atorvastatin (LIPITOR) 10 MG tablet Take 10 mg by mouth       bumetanide (BUMEX) 1 MG tablet Take 1 mg by mouth       cholecalciferol 25 MCG (1000 UT) TABS Take 25 mcg by mouth       DULoxetine (CYMBALTA) 60 MG capsule TAKE 2 CAPSULES BY MOUTH ONCE DAILY       famotidine (PEPCID) 20 MG tablet Take 20 mg by mouth       gabapentin (NEURONTIN) 300 MG capsule Take 300 mg by mouth       hydrOXYzine (VISTARIL) 25 MG capsule Take 25 mg by mouth       insulin aspart (NOVOLOG VIAL) 100 UNITS/ML vial Inject 24 Units Subcutaneous       insulin glargine (LANTUS VIAL) 100 UNIT/ML vial Inject 47 Units Subcutaneous       insulin lispro (HUMALOG VIAL) 100 UNIT/ML vial Inject 2-10 Units Subcutaneous       lactulose (CHRONULAC) 10 GM/15ML solution Take 60 mLs by mouth       Magnesium Oxide 500 MG TABS Take 500 mg by mouth       melatonin 3 MG tablet Take 3 mg by mouth       mineral oil-hydrophilic petrolatum (AQUAPHOR) external ointment        Multiple Vitamins-Minerals (MULTI VITAMIN  /MINERALS) TABS Take 1 tablet by mouth       naloxone (NARCAN) 4 MG/0.1ML nasal spray Spray 1 spray (4 mg) into one nostril 1 time; may repeat in opposite nostril in 2 minutes if person does not respond.       pantoprazole (PROTONIX) 40 MG EC tablet Take 40 mg by mouth       senna-docusate (SENOKOT-S/PERICOLACE) 8.6-50 MG tablet Take 2 tablets by mouth       zolpidem (AMBIEN) 5 MG tablet Take 5 mg by mouth       acetaminophen (TYLENOL) 500 MG tablet Take 1,000 mg by mouth (Patient not taking: Reported on 1/10/2022)       busPIRone (BUSPAR) 10 MG tablet Take 10 mg by mouth       topiramate (TOPAMAX) 25 MG tablet Take 25 mg by mouth         ALLERGIES:  No Known Allergies    No results found for any previous visit.       PHYSICAL EXAM:  Objective    Physical Exam   GENERAL:  Alert and no distress  EYES: Eyes grossly normal to inspection.  No discharge or erythema, or obvious  scleral/conjunctival abnormalities.  RESP: No audible wheeze, cough, or visible cyanosis.  No visible retractions or increased work of breathing.    SKIN: Visible skin clear. No significant rash, abnormal pigmentation or lesions.  NEURO: Cranial nerves grossly intact.  Mentation and speech appropriate for age.  PSYCH: Mentation appears normal, affect normal/bright, judgement and insight intact, normal speech and appearance well-groomed.      In summary, Max Meade has Class III obesity with a body mass index of Body mass index is 66.58 kg/m . kg/m2 and the comorbidities stated above. He completed an informational seminar and is a possible candidate for the laparoscopic gastric sleeve.  He will have to complete the following pre-requisites:      If you have not already watched our online seminar please go to [email or web address]    Weight loss requirement: 100 lbs prior to surgery. Will have final weight check 2-3 weeks prior to surgery at anesthesia or nurse pre-op teaching visit.    -Need current weight confirmation at primary clinic or weight management clinic Yes    Bariatric labs ordered, call for a lab only appointment at any [facility] lab. To find a lab location near you, please call [phone]. Please let us know if orders need to be faxed to a non [facility] lab.    Schedule bariatric psych eval as soon as possible.  List of psychologists will be sent to you via 36Kr or given to you in clinic.     Call Itz Ortega at [phone] to discuss insurance coverage for bariatric surgery.  Please check with your insurance regarding bariatric surgery coverage also. Itz can also help you with scheduling psych eval if you are having difficulties.    The following clearance letters are needed: Letters will be sent to you via 36Kr or given to you in clinic  - Letter of support from primary care provider. Provider can submit through electronic medical record or fax to  [phone]      NEXT VISITS: A  should reach out to you to schedule the following appointments.  If they do not reach you please call [phone] to schedule the following appointments:    -See dietitian in 1 month and monthly for 3 months    -See Sherice in 3 months to follow up on pre-op weight loss and weight loss medications    -See Dr Campos after psych and more mobile.  See in person on a Thursday in clinic.  [phone] to schedule.       Today in the office we discussed gastric sleeve surgery. Preoperative, perioperative, and postoperative processes, management, and follow up were addressed.  Risks and benefits were outlined including the risk of death, staple line leak (1-2%), PE, DVT, ulcer, worsening GERD, N/V, stricture, hernia, wound infection, weight regain, and vitamin deficiencies. I emphasized exercise and activity along with appropriate food choice as the main foundation for weight loss with surgery providing surgical reinforcement of this.  All questions were answered.  A goal sheet and support group handout were given to the patient.      Once the patient has completed the requirements in their task list and there are no further recommendations, the pt will be allowed to see the surgeon of her choice for consultation on the laparoscopic gastric sleeve surgery. Patient verbalizes understanding of the process to surgery and expectations for the postoperative period including the need for lifelong lifestyle changes, vitamin supplementation, and laboratory monitoring.    Sincerely,     Sherice Schmid PA-C

## 2022-12-02 NOTE — PROGRESS NOTES
Faxed lab orders to [facility], fax number: [phone].    Rea Multani, EMT

## 2022-12-19 ENCOUNTER — TELEPHONE (OUTPATIENT)
Dept: NEUROPSYCHOLOGY | Facility: CLINIC | Age: 42
End: 2022-12-19

## 2022-12-19 NOTE — TELEPHONE ENCOUNTER
Patient called to schedule pre-bariatric surgery psychological evaluation with Dr. Gonzalez. Scheduled video visit on Monday, February 13 at 8:30 a.m., to plan on 2 hours for interview followed by MMP testing.

## 2023-01-02 NOTE — PROGRESS NOTES
"Max Meade is a 42 year old male who is being evaluated via a billable video visit.      The patient has been notified of following:     \"This video visit will be conducted via a call between you and your physician/provider. We have found that certain health care needs can be provided without the need for an in-person physical exam.  This service lets us provide the care you need with a video conversation.  If a prescription is necessary we can send it directly to your pharmacy.  If lab work is needed we can place an order for that and you can then stop by our lab to have the test done at a later time.    Video visits are billed at different rates depending on your insurance coverage.  Please reach out to your insurance provider with any questions.    If during the course of the call the physician/provider feels a video visit is not appropriate, you will not be charged for this service.\"    Patient has given verbal consent for Video visit? Yes  How would you like to obtain your AVS? MyChart  Will anyone else be joining your video visit? No  {If patient encounters technical issues they should call [phone]      Video-Visit Details    Type of service:  Video Visit    Video Start Time: 10:32 AM   Video End Time: 10:47 AM    Originating Location (pt. Location): Home    Distant Location (provider location):  [facility] [place]     Platform used for Video Visit: Jobydu    During this virtual visit the patient is located in [place], patient verifies this as the location during the entirety of this visit.     Pt signed Medicare ABN form which is good for 1 year (8/29/22 - 8/29/23). Scanned into chart 8/30/22.      Weight Management Nutrition Consultation    Max Meade is a 42 year old male presents today for return weight management nutrition consultation.  Patient referred by Dr. Hdz on December 27, 2021.    Per surgery PA - Patient interested in weight loss surgery " "which we can discuss with surgeon when weight loss goal of 100 lbs lost met and patient mobile.     Patient with Co-morbidities of obesity including:  Type II DM yes (insulin dependant, managed inpatient currently. HgbA1c 9.7 at last check on 9/21/22)  Renal Failure no  Sleep apnea yes  Hypertension no   Dyslipidemia no  Joint pain yes  Back pain yes  GERD no     Anthropometrics:  He reports checking weight weekly    Current weight (pt report): 544 lbs (-9 lbs over the past 8 weeks, -123 lbs from initial weight of 667 lbs)    Weight history (per pt report):  3/28/22: 631 lbs   2/14/22: 648 lbs   2/23/22: 642 lbs   1/24/22: 648 lbs    Estimated body mass index is 81.19 kg/m  as calculated from the following:    Height as of an earlier encounter on 12/27/21: 1.93 m (6' 4\").    Weight as of an earlier encounter on 12/27/21: 302.5 kg (667 lb).    Per notes from [facility] Bariatric RD:  12/13/21 (!) 293.8 kg (647 lb 12.8 oz)   12/08/21 (!) 287.9 kg (634 lb 12.8 oz)   09/27/21 (!) 292.2 kg (644 lb 3.2 oz)     Admitted in March 2021. Notes he started working on weight loss at this time. Weight per care everywhere:   321 kg (707 lb 10.8 oz) 03/18/2021        Medications for Weight Loss:  Ozempic, may be switching to Wegovy   Topiramate    NUTRITION HISTORY  See previous RD notes for full nutrition history.    Per questionnaire, strong emotional component to eating. Readiness and confidence to change, rated 10 (very ready).    Saw [facility] bariatric RD 6 times, last visit 12/16/21.     Currently admitted (since March 2021) at [facility] in [facility] and working with inpatient RD on weight loss.  He is getting 3 meals and 2 snack per day from the hospital.     Does not tolerate regular milk.     9/26/22 - Met with Ortho for knee pain and received corticosteroid injection.     Last RD visit 11/17/22 - Finding shakes very helpful for weight loss. Reports not having shakes for a few days while they were " out at the hospital. Once he restarted he reports losing 7 lbs.     12/1/22 - Met with bariatric surgery PA to discuss requirements of weight loss surgery.     Today - Care team is now challenging him to ambulate to bathroom, get up for meals and get out of bed without lift. He reports being able to walk 34 steps now. He continues hospital food plan below. He reports family did bring in food during the holidays, and he still has some pork rinds left-over. However he is contemplating throwing them away because he did notice some wt gain these last few weeks from eating outside food.     [id number] calories/day (30 gm pro/meal, <60 gm CHO/meal, <15 gm CHO/snack):  Breakfast: 10 am protein shake + english muffin + 1 pc sausage + egg   12 pm snack: 1/2 cup fresh fruit bowl (grapes, pineapple, strawberries and melon)  Lunch 2pm: protein shake with watermelon/grapes and SF jello  4pm snack: 70 ronna popcorn (white cheddar)  Dinner: 6 pm Grilled cheese and ham and side chicken noodle soup and protein shake and SF jello   10 pm: Greek yogurt and honey and string cheese - taking with medication   Beverages: water/crystal lite (5-6 x 8 oz/day), one coke zero daily.    Progress Towards Previous Goals:  1) Follow 1400 calorie/day plan - Met, continues    - Continue to replace 2 meals per day with low-calorie protein shake.  2) Aim for less than 60 gm carb per meal and less than 15 gm carb per snack - Met, continues  3) Eat very slowly, chewing thoroughly. Take 20-30 mins to eat. - Met, contnues  4) Work to maintain consistency with exercise sessions. Keep up with walking routine with PT. - Met, continues     Physical Activity:  PT/OT and exercise 4 times per week for 30 mins and doing seated and now standing exercises.    Nutrition Prescription  Recommended energy/nutrient modification.  1500 calories/day or less (per MD)    Nutrition Diagnosis  Obesity r/t long history of positive energy balance aeb BMI >30. -  improving/continues    Nutrition Intervention  Materials/education provided:  - Reviewed progress towards previous goals  - Reviewed dietary strategy for weight loss and management of type 2 DM - continuing hypocaloric and low/moderate carb meal plan as established. Encourage he continue to avoid outside food again.   - Discussed need to be mobile for surgery. Encouraged consistency with exercise sessions and PT. Praised pt on the progress he has made with thus far.    - Reviewed bariatric post-op diet guidelines.  - Answered pt nutrition related questions.      Patient demonstrates understanding.    Expected Engagement: good    Nutrition Goals  1) Follow 1400 calorie/day plan   - Continue to replace 2 meals per day with low-calorie protein shake.  2) Aim for less than 60 gm carb per meal and less than 15 gm carb per snack  3) Eat very slowly, chewing thoroughly. Take 20-30 mins to eat.   4) Work to maintain consistency with exercise sessions. Keep up with walking routine with PT.     Meal Replacement Shake Options:   *Protein Shake Criteria: no more than 210 Calories, at least 20 grams of protein, and less than 10 grams of sugar   [employer] [place] [place] smoothie (160 Calories, 20 g protein)   Premier Protein (160 Calories, 30 g protein)  Slim Fast Advanced Nutrition (180 Calories, 20 g protein)  Muscle Milk, lactose-free, 17 oz bottle (210 Calories, 30 g protein)  Integrated Supplements, no artificial sugars (110 Calories, 20 g protein)  Genepro, unflavored protein powder (60 Calories, 30 g protein)  Boost/Ensure Max (160 calories, 30 gm protein)   OSSIANIX Core Power (170 calories, 26 gm protein)    Meal Replacement Bar Options:  [employer] [place] [place] Protein Shake (160 Calories, 15 g protein)  Quest Protein Bars (190 Calories, 20 g protein)  Built Bar (170 Calories, 15-20 g protein)  One Protein Bar (210 calories, 20 g protein)  Fletcher Signature Protein Bar ([employer]) (190 Calories, 21 g protein)  Pure Protein Bars (180 Calories,  21 g protein)      Protein Sources for Weight Loss  [email or web address]     Carbohydrates  [email or web address]     Mindful Eating  [email or web address]     Diet Guidelines after Weight Loss Surgery  [email or web address]     Seated Exercises for Arms and Legs (can be done before or after surgery)  [email or web address]      Follow-Up:  1-2 month, PRN    Time spent with patient: 15 minutes.  Blossom Caal, JOSEPHINE, LD

## 2023-01-03 ENCOUNTER — VIRTUAL VISIT (OUTPATIENT)
Dept: ENDOCRINOLOGY | Facility: CLINIC | Age: 43
End: 2023-01-03
Payer: MEDICARE

## 2023-01-03 DIAGNOSIS — E66.01 MORBID OBESITY (H): ICD-10-CM

## 2023-01-03 DIAGNOSIS — E11.9 TYPE 2 DIABETES MELLITUS WITHOUT COMPLICATION, WITHOUT LONG-TERM CURRENT USE OF INSULIN (H): ICD-10-CM

## 2023-01-03 DIAGNOSIS — Z71.3 NUTRITIONAL COUNSELING: Primary | ICD-10-CM

## 2023-01-03 PROCEDURE — 97803 MED NUTRITION INDIV SUBSEQ: CPT | Mod: GA | Performed by: DIETITIAN, REGISTERED

## 2023-01-03 NOTE — LETTER
Date:January 4, 2023      Patient was self referred, no letter generated. Do not send.        [facility] Health Information

## 2023-01-03 NOTE — LETTER
"1/3/2023       RE: Max Meade  [address]  [place] [address]     Dear Colleague,    Thank you for referring your patient, Max Meade, to the [facility] [place] at [facility]. Please see a copy of my visit note below.    Max Meade is a 42 year old male who is being evaluated via a billable video visit.      The patient has been notified of following:     \"This video visit will be conducted via a call between you and your physician/provider. We have found that certain health care needs can be provided without the need for an in-person physical exam.  This service lets us provide the care you need with a video conversation.  If a prescription is necessary we can send it directly to your pharmacy.  If lab work is needed we can place an order for that and you can then stop by our lab to have the test done at a later time.    Video visits are billed at different rates depending on your insurance coverage.  Please reach out to your insurance provider with any questions.    If during the course of the call the physician/provider feels a video visit is not appropriate, you will not be charged for this service.\"    Patient has given verbal consent for Video visit? Yes  How would you like to obtain your AVS? MyChart  Will anyone else be joining your video visit? No  {If patient encounters technical issues they should call [phone]      Video-Visit Details    Type of service:  Video Visit    Video Start Time: 10:32 AM   Video End Time: 10:47 AM    Originating Location (pt. Location): Home    Distant Location (provider location):  [facility] [place]     Platform used for Video Visit: Jumpido    During this virtual visit the patient is located in [place], patient verifies this as the location during the entirety of this visit.     Pt signed Medicare ABN form which is " "good for 1 year (8/29/22 - 8/29/23). Scanned into chart 8/30/22.      Weight Management Nutrition Consultation    Max Meade is a 42 year old male presents today for return weight management nutrition consultation.  Patient referred by Dr. Hdz on December 27, 2021.    Per surgery PA - Patient interested in weight loss surgery which we can discuss with surgeon when weight loss goal of 100 lbs lost met and patient mobile.     Patient with Co-morbidities of obesity including:  Type II DM yes (insulin dependant, managed inpatient currently. HgbA1c 9.7 at last check on 9/21/22)  Renal Failure no  Sleep apnea yes  Hypertension no   Dyslipidemia no  Joint pain yes  Back pain yes  GERD no     Anthropometrics:  He reports checking weight weekly    Current weight (pt report): 544 lbs (-9 lbs over the past 8 weeks, -123 lbs from initial weight of 667 lbs)    Weight history (per pt report):  3/28/22: 631 lbs   2/14/22: 648 lbs   2/23/22: 642 lbs   1/24/22: 648 lbs    Estimated body mass index is 81.19 kg/m  as calculated from the following:    Height as of an earlier encounter on 12/27/21: 1.93 m (6' 4\").    Weight as of an earlier encounter on 12/27/21: 302.5 kg (667 lb).    Per notes from [facility] RD:  12/13/21 (!) 293.8 kg (647 lb 12.8 oz)   12/08/21 (!) 287.9 kg (634 lb 12.8 oz)   09/27/21 (!) 292.2 kg (644 lb 3.2 oz)     Admitted in March 2021. Notes he started working on weight loss at this time. Weight per care everywhere:   321 kg (707 lb 10.8 oz) 03/18/2021        Medications for Weight Loss:  Ozempic, may be switching to Wegovy   Topiramate    NUTRITION HISTORY  See previous RD notes for full nutrition history.    Per questionnaire, strong emotional component to eating. Readiness and confidence to change, rated 10 (very ready).    Saw [facility] bariat[facility] RD 6 times, last visit 12/16/21.     Currently admitted (since March 2021) at [facility] in [facility] and working with " inpatient RD on weight loss.  He is getting 3 meals and 2 snack per day from the hospital.     Does not tolerate regular milk.     9/26/22 - Met with Ortho for knee pain and received corticosteroid injection.     Last RD visit 11/17/22 - Finding shakes very helpful for weight loss. Reports not having shakes for a few days while they were out at the hospital. Once he restarted he reports losing 7 lbs.     12/1/22 - Met with bariatric surgery PA to discuss requirements of weight loss surgery.     Today - Care team is now challenging him to ambulate to bathroom, get up for meals and get out of bed without lift. He reports being able to walk 34 steps now. He continues hospital food plan below. He reports family did bring in food during the holidays, and he still has some pork rinds left-over. However he is contemplating throwing them away because he did notice some wt gain these last few weeks from eating outside food.     7013-3460 calories/day (30 gm pro/meal, <60 gm CHO/meal, <15 gm CHO/snack):  Breakfast: 10 am protein shake + english muffin + 1 pc sausage + egg   12 pm snack: 1/2 cup fresh fruit bowl (grapes, pineapple, strawberries and melon)  Lunch 2pm: protein shake with watermelon/grapes and SF jello  4pm snack: 70 ronna popcorn (white cheddar)  Dinner: 6 pm Grilled cheese and ham and side chicken noodle soup and protein shake and SF jello   10 pm: Greek yogurt and honey and string cheese - taking with medication   Beverages: water/crystal lite (5-6 x 8 oz/day), one coke zero daily.    Progress Towards Previous Goals:  1) Follow 1400 calorie/day plan - Met, continues    - Continue to replace 2 meals per day with low-calorie protein shake.  2) Aim for less than 60 gm carb per meal and less than 15 gm carb per snack - Met, continues  3) Eat very slowly, chewing thoroughly. Take 20-30 mins to eat. - Met, contnues  4) Work to maintain consistency with exercise sessions. Keep up with walking routine with PT. - Met,  continues     Physical Activity:  PT/OT and exercise 4 times per week for 30 mins and doing seated and now standing exercises.    Nutrition Prescription  Recommended energy/nutrient modification.  1500 calories/day or less (per MD)    Nutrition Diagnosis  Obesity r/t long history of positive energy balance aeb BMI >30. - improving/continues    Nutrition Intervention  Materials/education provided:  - Reviewed progress towards previous goals  - Reviewed dietary strategy for weight loss and management of type 2 DM - continuing hypocaloric and low/moderate carb meal plan as established. Encourage he continue to avoid outside food again.   - Discussed need to be mobile for surgery. Encouraged consistency with exercise sessions and PT. Praised pt on the progress he has made with thus far.    - Reviewed bariatric post-op diet guidelines.  - Answered pt nutrition related questions.      Patient demonstrates understanding.    Expected Engagement: good    Nutrition Goals  1) Follow 1400 calorie/day plan   - Continue to replace 2 meals per day with low-calorie protein shake.  2) Aim for less than 60 gm carb per meal and less than 15 gm carb per snack  3) Eat very slowly, chewing thoroughly. Take 20-30 mins to eat.   4) Work to maintain consistency with exercise sessions. Keep up with walking routine with PT.     Meal Replacement Shake Options:   *Protein Shake Criteria: no more than 210 Calories, at least 20 grams of protein, and less than 10 grams of sugar   [facility] smoothie (160 Calories, 20 g protein)   Premier Protein (160 Calories, 30 g protein)  Slim Fast Advanced Nutrition (180 Calories, 20 g protein)  Muscle Milk, lactose-free, 17 oz bottle (210 Calories, 30 g protein)  Integrated Supplements, no artificial sugars (110 Calories, 20 g protein)  Genepro, unflavored protein powder (60 Calories, 30 g protein)  Boost/Ensure Max (160 calories, 30 gm protein)   DVS Intelestream Core Power (170 calories, 26 gm protein)    Meal  Replacement Bar Options:  [facility] Protein Shake (160 Calories, 15 g protein)  Quest Protein Bars (190 Calories, 20 g protein)  Built Bar (170 Calories, 15-20 g protein)  One Protein Bar (210 calories, 20 g protein)  [place] Signature Protein Bar ([employer]) (190 Calories, 21 g protein)  Pure Protein Bars (180 Calories, 21 g protein)      Protein Sources for Weight Loss  [email or web address]     Carbohydrates  [email or web address]     Mindful Eating  [email or web address]     Diet Guidelines after Weight Loss Surgery  [email or web address]     Seated Exercises for Arms and Legs (can be done before or after surgery)  [email or web address]      Follow-Up:  1-2 month, PRN    Time spent with patient: 15 minutes.  Blossom Caal RD, LD      Again, thank you for allowing me to participate in the care of your patient.      Sincerely,    Blossom Caal RD

## 2023-02-13 ENCOUNTER — VIRTUAL VISIT (OUTPATIENT)
Dept: NEUROPSYCHOLOGY | Facility: CLINIC | Age: 43
End: 2023-02-13
Payer: MEDICARE

## 2023-02-13 DIAGNOSIS — F54 PSYCHOLOGICAL FACTORS AFFECTING MEDICAL CONDITION: ICD-10-CM

## 2023-02-13 DIAGNOSIS — Z01.818 PREPROCEDURAL EXAMINATION: Primary | ICD-10-CM

## 2023-02-13 DIAGNOSIS — E66.01 MORBID OBESITY (H): ICD-10-CM

## 2023-02-13 PROCEDURE — 90791 PSYCH DIAGNOSTIC EVALUATION: CPT | Mod: VID

## 2023-02-13 PROCEDURE — 96139 PSYCL/NRPSYC TST TECH EA: CPT | Mod: VID

## 2023-02-13 PROCEDURE — 96130 PSYCL TST EVAL PHYS/QHP 1ST: CPT | Mod: VID

## 2023-02-13 PROCEDURE — 96138 PSYCL/NRPSYC TECH 1ST: CPT | Mod: VID

## 2023-02-13 NOTE — PROGRESS NOTES
The patient was seen for neuropsychological evaluation via telehealth at the request of Sherice Schmid PA-C for the purposes of diagnostic clarification and treatment planning. 76 minutes of video test administration and scoring were provided by this writer. Please see Dr. Jadyn Gonzalez's report for a full interpretation of the findings.    Rea Blackmon  Psychometrist
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
## 2023-02-22 NOTE — PROGRESS NOTES
"Mxa Meade is a 42 year old male who is being evaluated via a billable video visit.      The patient has been notified of following:     \"This video visit will be conducted via a call between you and your physician/provider. We have found that certain health care needs can be provided without the need for an in-person physical exam.  This service lets us provide the care you need with a video conversation.  If a prescription is necessary we can send it directly to your pharmacy.  If lab work is needed we can place an order for that and you can then stop by our lab to have the test done at a later time.    Video visits are billed at different rates depending on your insurance coverage.  Please reach out to your insurance provider with any questions.    If during the course of the call the physician/provider feels a video visit is not appropriate, you will not be charged for this service.\"    Patient has given verbal consent for Video visit? Yes  How would you like to obtain your AVS? MyChart  Will anyone else be joining your video visit? No  {If patient encounters technical issues they should call [phone]      Video-Visit Details    Type of service:  Video Visit    Video Start Time: 10:26 AM   Video End Time: 10:53 AM    Originating Location (pt. Location): Home    Distant Location (provider location):  [facility] [place]     Platform used for Video Visit: Dis[employer]ix    During this virtual visit the patient is located in [place], patient verifies this as the location during the entirety of this visit.     Pt signed Medicare ABN form which is good for 1 year (8/29/22 - 8/29/23). Scanned into chart 8/30/22.      Weight Management Nutrition Consultation    Max Meade is a 42 year old male presents today for return weight management nutrition consultation.  Patient referred by Dr. Hdz on December 27, 2021.    Patient with Co-morbidities of obesity including:  Type II DM " "yes   Renal Failure no  Sleep apnea yes  Hypertension no   Dyslipidemia no  Joint pain yes  Back pain yes  GERD no     Anthropometrics:  He reports checking weight weekly    Current weight (pt report): 527 lbs (-17 lbs over the past 8 weeks, -140 lbs from initial consult weight of 667 lbs)    Weight history (per pt report):  3/28/22: 631 lbs   2/14/22: 648 lbs   2/23/22: 642 lbs   1/24/22: 648 lbs    Estimated body mass index is 81.19 kg/m  as calculated from the following:    Height as of an earlier encounter on 12/27/21: 1.93 m (6' 4\").    Weight as of an earlier encounter on 12/27/21: 302.5 kg (667 lb).    Per notes from [facility] Bariatric RD:  12/13/21 (!) 293.8 kg (647 lb 12.8 oz)   12/08/21 (!) 287.9 kg (634 lb 12.8 oz)   09/27/21 (!) 292.2 kg (644 lb 3.2 oz)     Admitted in March 2021. Notes he started working on weight loss at this time. Weight per care everywhere:   321 kg (707 lb 10.8 oz) 03/18/2021        Medications for Weight Loss:  Ozempic, may be switching to Wegovy   Topiramate    NUTRITION HISTORY  See previous RD notes for full nutrition history.  Per questionnaire, strong emotional component to eating. Readiness and confidence to change, rated 10 (very ready).  Saw [facility] bariatric RD 6 times, last visit 12/16/21.     Currently admitted (since March 2021) at [facility] in [facility] and working with inpatient RD on weight loss.  He is getting 3 meals and 2 snack per day from the hospital.     Does not tolerate regular milk.     9/26/22 - Met with Ortho for knee pain and received corticosteroid injection.     RD visit 11/17/22 - Finding shakes very helpful for weight loss. Reports not having shakes for a few days while they were out at the hospital. Once he restarted he reports losing 7 lbs.     12/1/22 - Met with bariatric surgery PA to discuss requirements of weight loss surgery.     Last RD visit 1/3/23 - Care team is now challenging him to ambulate to bathroom, get up " for meals and get out of bed without lift. He reports being able to walk 34 steps now. He continues hospital food plan below. He reports family did bring in food during the holidays, and he still has some pork rinds left-over. However he is contemplating throwing them away because he did notice some wt gain these last few weeks from eating outside food.     Visit with Dr. Gonzalez 2/13 for bariatric psych eval - Mr. Meade has been in the hospital for the last year and a half, but is now preparing to be discharged to his own apartment in about a month.  He has been approved for 35 hours a week with a PCA, and he has a CADI waiver.  He will have a CNA coming in 3-4 times a week, and an RN to set up his medications for him.  He will have housekeeping assistance for light cleaning.  His bathroom has been made handicapped accessible, so he believes that he has all the tools and people in place so that he can succeed.    Today - Made some changes to dinner options, was getting sick of same meal. Changes keep caloric intake to 9161-7977 ronna/day. Doing exercising with PT 4 times per week for 30-60 mins. Walking progressing, reports being able to go 30-40 ft at a time, is able to ambulate and sit on commode without assistance. He reports working towards discharge this month to independent living apartment, where he will have home care.     Hgb A1c 8.2 on 2/19/23, improved from 8.9 on 12/3/22 8.9.       5421-5742 calories/day (30 gm pro/meal, <60 gm CHO/meal, <15 gm CHO/snack):  Breakfast: 10 am protein shake + english muffin + 1 pc sausage + egg   12 pm snack: tuna sandwich and fruit   Lunch 2pm: protein shake with watermelon/grapes and SF jello  4pm snack: 70 ronna popcorn (white cheddar)  Dinner: 6 pm hamburger helper and green beans and protein shake and SF jello   10 pm: Greek yogurt and honey and string cheese - taking with medication   Beverages: water/crystal lite (5-6 x 8 oz/day), one coke zero daily.    Progress  Towards Previous Goals:  1) Follow 1400 calorie/day plan - Met, continues    - Continue to replace 2 meals per day with low-calorie protein shake.  2) Aim for less than 60 gm carb per meal and less than 15 gm carb per snack - Met, continues  3) Eat very slowly, chewing thoroughly. Take 20-30 mins to eat. - Met, contndanika  4) Work to maintain consistency with exercise sessions. Keep up with walking routine with PT. - Met, continues     Physical Activity:  PT/OT and exercise 4 times per week for 30 mins and doing seated and now standing exercises.    Nutrition Prescription  Recommended energy/nutrient modification.  1500 calories/day or less (per MD)    Nutrition Diagnosis  Obesity r/t long history of positive energy balance aeb BMI >30. - improving/continues    Nutrition Intervention  Materials/education provided:  - Reviewed progress towards previous goals  - Reviewed dietary strategy for weight loss and management of type 2 DM - continuing hypocaloric and low/moderate carb meal plan as established.   - Discussed need to be mobile for surgery. Encouraged consistency with exercise sessions and PT. Praised pt on the progress he has made with thus far.    - Reviewed bariatric post-op diet guidelines.  - Answered pt nutrition related questions.    - Reviewed surgery task list - pt to work on obtaining clearance from Sleep medicine, and letters of support from primary care and therapist.   - Provided pt with list of goals and resources below via [employer]    Patient demonstrates understanding.    Expected Engagement: good    Nutrition Goals  1) Follow 1400 calorie/day plan   - Continue to include low-calorie protein shakes with 2 meals and snacks as needed daily.  2) Aim for less than 60 gm carb per meal and less than 15 gm carb per snack  3) Eat very slowly, chewing thoroughly. Take 20-30 mins to eat.   4) Work to maintain consistency with exercise sessions. Keep up with walking routine with PT.     Meal Replacement  Shake Options:   *Protein Shake Criteria: no more than 210 Calories, at least 20 grams of protein, and less than 10 grams of sugar   [facility] smoothie (160 Calories, 20 g protein)   Premier Protein (160 Calories, 30 g protein)  Slim Fast Advanced Nutrition (180 Calories, 20 g protein)  Muscle Milk, lactose-free, 17 oz bottle (210 Calories, 30 g protein)  Integrated Supplements, no artificial sugars (110 Calories, 20 g protein)  Genepro, unflavored protein powder (60 Calories, 30 g protein)  Boost/Ensure Max (160 calories, 30 gm protein)   [facility] Core Power (170 calories, 26 gm protein)  Aldi's Elevation Protein Powder (180 calories, 30 gm protein)     Meal Replacement Bar Options:  [facility] Protein Shake (160 Calories, 15 g protein)  Quest Protein Bars (190 Calories, 20 g protein)  Built Bar (170 Calories, 15-20 g protein)  One Protein Bar (210 calories, 20 g protein)  [place] Signature Protein Bar ([employer]) (190 Calories, 21 g protein)  Pure Protein Bars (180 Calories, 21 g protein)    Low Calorie Frozen Meal:  Healthy Choice Power Bowls  Lean Cuisine  Smart Ones  Jose Eisenberg      Protein Sources for Weight Loss  [email or web address]     Carbohydrates  [email or web address]     Mindful Eating  [email or web address]     Seated Exercises for Arms and Legs (can be done before or after surgery)  [email or web address]    Post-op Diet Handouts:  Diet Guidelines after Weight-loss Surgery  [email or web address]     Your Stage 1 Diet: Clear Liquids  [email or web address]     Your Stage 2 Diet: Low-fat Full Liquids  [email or web address]     Your Stage 3 Diet: Pureed Foods  [email or web address]     Pureed Pleasures  [email or web address]    Your Stage 4 Diet: Soft Foods  [email or web address]    Your Stage 5 Diet: Regular Foods  [email or web address]    Supplements after Sleeve Gastrectomy, Gastric Bypass or Single  Anastomosis Duodenal Switch  [email or web address]    Keeping Track of Fluids  [email or web address]      Follow-Up:  3/28/23    Time spent with patient: 27 minutes.  Blossom Caal, JOSEPHINE, LD

## 2023-02-23 ENCOUNTER — VIRTUAL VISIT (OUTPATIENT)
Dept: ENDOCRINOLOGY | Facility: CLINIC | Age: 43
End: 2023-02-23
Payer: MEDICARE

## 2023-02-23 DIAGNOSIS — E66.01 MORBID OBESITY (H): ICD-10-CM

## 2023-02-23 DIAGNOSIS — Z71.3 NUTRITIONAL COUNSELING: Primary | ICD-10-CM

## 2023-02-23 DIAGNOSIS — E11.9 TYPE 2 DIABETES MELLITUS WITHOUT COMPLICATION, WITHOUT LONG-TERM CURRENT USE OF INSULIN (H): ICD-10-CM

## 2023-02-23 PROCEDURE — 97803 MED NUTRITION INDIV SUBSEQ: CPT | Mod: GA | Performed by: DIETITIAN, REGISTERED

## 2023-02-23 NOTE — LETTER
Date:February 24, 2023      Provider requested that no letter be sent. Do not send.       [facility]

## 2023-02-23 NOTE — LETTER
"2/23/2023       RE: Max Meade  [address]  [facility] [address]     Dear Colleague,    Thank you for referring your patient, Max Meade, to the [facility]NA[facility] [place] at [facility]. Please see a copy of my visit note below.    Max Meade is a 42 year old male who is being evaluated via a billable video visit.      The patient has been notified of following:     \"This video visit will be conducted via a call between you and your physician/provider. We have found that certain health care needs can be provided without the need for an in-person physical exam.  This service lets us provide the care you need with a video conversation.  If a prescription is necessary we can send it directly to your pharmacy.  If lab work is needed we can place an order for that and you can then stop by our lab to have the test done at a later time.    Video visits are billed at different rates depending on your insurance coverage.  Please reach out to your insurance provider with any questions.    If during the course of the call the physician/provider feels a video visit is not appropriate, you will not be charged for this service.\"    Patient has given verbal consent for Video visit? Yes  How would you like to obtain your AVS? MyChart  Will anyone else be joining your video visit? No  {If patient encounters technical issues they should call [phone]      Video-Visit Details    Type of service:  Video Visit    Video Start Time: 10:26 AM   Video End Time: 10:53 AM    Originating Location (pt. Location): Home    Distant Location (provider location):  [facility] [place]     Platform used for Video Visit: [employer]    During this virtual visit the patient is located in [place], patient verifies this as the location during the entirety of this visit.     Pt signed Medicare ABN form which is " "good for 1 year (8/29/22 - 8/29/23). Scanned into chart 8/30/22.      Weight Management Nutrition Consultation    Max Meade is a 42 year old male presents today for return weight management nutrition consultation.  Patient referred by Dr. Hdz on December 27, 2021.    Patient with Co-morbidities of obesity including:  Type II DM yes   Renal Failure no  Sleep apnea yes  Hypertension no   Dyslipidemia no  Joint pain yes  Back pain yes  GERD no     Anthropometrics:  He reports checking weight weekly    Current weight (pt report): 527 lbs (-17 lbs over the past 8 weeks, -140 lbs from initial consult weight of 667 lbs)    Weight history (per pt report):  3/28/22: 631 lbs   2/14/22: 648 lbs   2/23/22: 642 lbs   1/24/22: 648 lbs    Estimated body mass index is 81.19 kg/m  as calculated from the following:    Height as of an earlier encounter on 12/27/21: 1.93 m (6' 4\").    Weight as of an earlier encounter on 12/27/21: 302.5 kg (667 lb).    Per notes from [facility] Bariatric RD:  12/13/21 (!) 293.8 kg (647 lb 12.8 oz)   12/08/21 (!) 287.9 kg (634 lb 12.8 oz)   09/27/21 (!) 292.2 kg (644 lb 3.2 oz)     Admitted in March 2021. Notes he started working on weight loss at this time. Weight per care everywhere:   321 kg (707 lb 10.8 oz) 03/18/2021        Medications for Weight Loss:  Ozempic, may be switching to Wegovy   Topiramate    NUTRITION HISTORY  See previous RD notes for full nutrition history.  Per questionnaire, strong emotional component to eating. Readiness and confidence to change, rated 10 (very ready).  Saw [facility] bariatric RD 6 times, last visit 12/16/21.     Currently admitted (since March 2021) at [facility] in [facility] and working with inpatient RD on weight loss.  He is getting 3 meals and 2 snack per day from the hospital.     Does not tolerate regular milk.     9/26/22 - Met with Ortho for knee pain and received corticosteroid injection.     RD visit 11/17/22 - Finding " shakes very helpful for weight loss. Reports not having shakes for a few days while they were out at the hospital. Once he restarted he reports losing 7 lbs.     12/1/22 - Met with bariatric surgery PA to discuss requirements of weight loss surgery.     Last RD visit 1/3/23 - Care team is now challenging him to ambulate to bathroom, get up for meals and get out of bed without lift. He reports being able to walk 34 steps now. He continues hospital food plan below. He reports family did bring in food during the holidays, and he still has some pork rinds left-over. However he is contemplating throwing them away because he did notice some wt gain these last few weeks from eating outside food.     Visit with Dr. Gonzalez 2/13 for bariatric psych eval - Mr. Meade has been in the hospital for the last year and a half, but is now preparing to be discharged to his own apartment in about a month.  He has been approved for 35 hours a week with a PCA, and he has a CADI waiver.  He will have a CNA coming in 3-4 times a week, and an RN to set up his medications for him.  He will have housekeeping assistance for light cleaning.  His bathroom has been made handicapped accessible, so he believes that he has all the tools and people in place so that he can succeed.    Today - Made some changes to dinner options, was getting sick of same meal. Changes keep caloric intake to 1015-0143 ronna/day. Doing exercising with PT 4 times per week for 30-60 mins. Walking progressing, reports being able to go 30-40 ft at a time, is able to ambulate and sit on commode without assistance. He reports working towards discharge this month to independent living apartment, where he will have home care.     Hgb A1c 8.2 on 2/19/23, improved from 8.9 on 12/3/22 8.9.       1412-6002 calories/day (30 gm pro/meal, <60 gm CHO/meal, <15 gm CHO/snack):  Breakfast: 10 am protein shake + english muffin + 1 pc sausage + egg   12 pm snack: tuna sandwich and fruit    Lunch 2pm: protein shake with watermelon/grapes and SF jello  4pm snack: 70 ronna popcorn (white cheddar)  Dinner: 6 pm hamburger helper and green beans and protein shake and SF jello   10 pm: Greek yogurt and honey and string cheese - taking with medication   Beverages: water/crystal lite (5-6 x 8 oz/day), one coke zero daily.    Progress Towards Previous Goals:  1) Follow 1400 calorie/day plan - Met, continues    - Continue to replace 2 meals per day with low-calorie protein shake.  2) Aim for less than 60 gm carb per meal and less than 15 gm carb per snack - Met, continues  3) Eat very slowly, chewing thoroughly. Take 20-30 mins to eat. - Met, princess  4) Work to maintain consistency with exercise sessions. Keep up with walking routine with PT. - Met, continues     Physical Activity:  PT/OT and exercise 4 times per week for 30 mins and doing seated and now standing exercises.    Nutrition Prescription  Recommended energy/nutrient modification.  1500 calories/day or less (per MD)    Nutrition Diagnosis  Obesity r/t long history of positive energy balance aeb BMI >30. - improving/continues    Nutrition Intervention  Materials/education provided:  - Reviewed progress towards previous goals  - Reviewed dietary strategy for weight loss and management of type 2 DM - continuing hypocaloric and low/moderate carb meal plan as established.   - Discussed need to be mobile for surgery. Encouraged consistency with exercise sessions and PT. Praised pt on the progress he has made with thus far.    - Reviewed bariatric post-op diet guidelines.  - Answered pt nutrition related questions.    - Reviewed surgery task list - pt to work on obtaining clearance from Sleep medicine, and letters of support from primary care and therapist.   - Provided pt with list of goals and resources below via AVS/ChaCha    Patient demonstrates understanding.    Expected Engagement: good    Nutrition Goals  1) Follow 1400 calorie/day plan   -  Continue to include low-calorie protein shakes with 2 meals and snacks as needed daily.  2) Aim for less than 60 gm carb per meal and less than 15 gm carb per snack  3) Eat very slowly, chewing thoroughly. Take 20-30 mins to eat.   4) Work to maintain consistency with exercise sessions. Keep up with walking routine with PT.     Meal Replacement Shake Options:   *Protein Shake Criteria: no more than 210 Calories, at least 20 grams of protein, and less than 10 grams of sugar   [facility] smoothie (160 Calories, 20 g protein)   Premier Protein (160 Calories, 30 g protein)  Slim Fast Advanced Nutrition (180 Calories, 20 g protein)  Muscle Milk, lactose-free, 17 oz bottle (210 Calories, 30 g protein)  Integrated Supplements, no artificial sugars (110 Calories, 20 g protein)  Genepro, unflavored protein powder (60 Calories, 30 g protein)  Boost/Ensure Max (160 calories, 30 gm protein)   yetu Core Power (170 calories, 26 gm protein)  Aldi's Elevation Protein Powder (180 calories, 30 gm protein)     Meal Replacement Bar Options:  [facility] Protein Shake (160 Calories, 15 g protein)  Quest Protein Bars (190 Calories, 20 g protein)  Built Bar (170 Calories, 15-20 g protein)  One Protein Bar (210 calories, 20 g protein)  [place] Signature Protein Bar (Costco) (190 Calories, 21 g protein)  Pure Protein Bars (180 Calories, 21 g protein)    Low Calorie Frozen Meal:  Healthy Choice Power Bowls  Lean Cuisine  Smart Ones  Jose Eisenberg      Protein Sources for Weight Loss  [email or web address]     Carbohydrates  [email or web address]     Mindful Eating  [email or web address]     Seated Exercises for Arms and Legs (can be done before or after surgery)  [email or web address]    Post-op Diet Handouts:  Diet Guidelines after Weight-loss Surgery  [email or web address]     Your Stage 1 Diet: Clear Liquids  [email or web address]     Your Stage 2 Diet: Low-fat Full  Liquids  [email or web address]     Your Stage 3 Diet: Pureed Foods  [email or web address]     Pureed Pleasures  [email or web address]    Your Stage 4 Diet: Soft Foods  [email or web address]    Your Stage 5 Diet: Regular Foods  [email or web address]    Supplements after Sleeve Gastrectomy, Gastric Bypass or Single Anastomosis Duodenal Switch  [email or web address]    Keeping Track of Fluids  [email or web address]      Follow-Up:  3/28/23    Time spent with patient: 27 minutes.  Blossom Caal RD, LD      Again, thank you for allowing me to participate in the care of your patient.      Sincerely,    Blossom Caal RD

## 2023-02-23 NOTE — PATIENT INSTRUCTIONS
Joe Santana,    Follow-up with RD on 3/28    Thank you,    Blossom Caal, RD, LD  If you would like to schedule or reschedule an appointment with the RD, please call [phone]    Nutrition Goals  1) Follow 1400 calorie/day plan   - Continue to include low-calorie protein shakes with 2 meals and snacks as needed daily.  2) Aim for less than 60 gm carb per meal and less than 15 gm carb per snack  3) Eat very slowly, chewing thoroughly. Take 20-30 mins to eat.   4) Work to maintain consistency with exercise sessions. Keep up with walking routine with PT.     Meal Replacement Shake Options:   *Protein Shake Criteria: no more than 210 Calories, at least 20 grams of protein, and less than 10 grams of sugar   [facility] smoothie (160 Calories, 20 g protein)   Premier Protein (160 Calories, 30 g protein)  Slim Fast Advanced Nutrition (180 Calories, 20 g protein)  Muscle Milk, lactose-free, 17 oz bottle (210 Calories, 30 g protein)  Integrated Supplements, no artificial sugars (110 Calories, 20 g protein)  Genepro, unflavored protein powder (60 Calories, 30 g protein)  Boost/Ensure Max (160 calories, 30 gm protein)   Fetch MD Core Power (170 calories, 26 gm protein)  Aldi's Elevation Protein Powder (180 calories, 30 gm protein)     Meal Replacement Bar Options:  [facility] Protein Shake (160 Calories, 15 g protein)  Quest Protein Bars (190 Calories, 20 g protein)  Built Bar (170 Calories, 15-20 g protein)  One Protein Bar (210 calories, 20 g protein)  [place] Signature Protein Bar (Costco) (190 Calories, 21 g protein)  Pure Protein Bars (180 Calories, 21 g protein)    Low Calorie Frozen Meal:  Healthy Choice Power Bowls  Lean Cuisine  Smart Ones  Jose Eisenberg      Protein Sources for Weight Loss  [email or web address]     Carbohydrates  [email or web address]     Mindful Eating  [email or web address]     Seated Exercises for Arms and Legs (can be done before or after  surgery)  [email or web address]    Post-op Diet Handouts:  Diet Guidelines after Weight-loss Surgery  [email or web address]     Your Stage 1 Diet: Clear Liquids  [email or web address]     Your Stage 2 Diet: Low-fat Full Liquids  [email or web address]     Your Stage 3 Diet: Pureed Foods  [email or web address]     Pureed Pleasures  [email or web address]    Your Stage 4 Diet: Soft Foods  [email or web address]    Your Stage 5 Diet: Regular Foods  [email or web address]    Supplements after Sleeve Gastrectomy, Gastric Bypass or Single Anastomosis Duodenal Switch  [email or web address]    Keeping Track of Fluids  [email or web address]            COMPREHENSIVE WEIGHT MANAGEMENT PROGRAM  VIRTUAL SUPPORT GROUPS    For Support Group Information:      We offer support groups for patients who are working on weight loss and considering, preparing for or have had weight loss surgery.   There is no cost for this opportunity.  You are invited to attend the?Virtual Support Groups?provided by any of the following locations:    [facility] via Covario Teams with Sarah Merritt RN  2.   [place] via Covario Teams with Obed Davenport, PhD, LP  3.   [place] via Covario Teams with Gabi Winchester RN  4.   [facility] via Covario Teams with Gabi [facility]    The following Support Group information can also be found on our website:  [email or web address]    [email or web address]    [facility] Weight Loss Surgery Support Group    [facility] Weight Loss Surgery Support Group  The support group is a patient-lead forum that meets monthly to share experiences, encouragement and education. It is open to those who have had weight loss surgery, are scheduled for surgery, or are  "considering surgery.   WHEN: This group meets on the 3rd Wednesday of each month from 5:00PM - 6:00PM virtually using Microsoft Teams.   FACILITATOR: Led by Sarah Ceja, JOSEPHINE, LD, RN, the program's Clinical Coordinator.   TO REGISTER: Please contact the clinic via [employer] or call the nurse line directly at [phone] to inform our staff that you would like an invite sent to you and to let us know the email you would like the invite sent to. Prior to the meeting, a link with directions on how to join the meeting will be sent to you.    2023 Meetings (speakers to be determined)  January 18: \"Let's Talk\" a time for the group to share.  February 15: \"Let's Talk\" a time for the group to share.  March 15: \"Let's Talk\" a time for the group to share.  April 19: \"Let's Talk\" a time for the group to share.  May 17: \"Let's Talk\" a time for the group to share.  June 21: \"Let's Talk\" a time for the group to share.    [facility] Support Groups    [facility] Bariatric Care Support Group?  This is open to all pre- and post- operative bariatric surgery patients as well as their support system.   WHEN: This group meets the 2nd Tuesday of each month from 6:30 PM - 8:00 PM virtually using Microsoft Teams.   FACILITATOR: Led by Obed Davenport, Ph.D who is a Licensed Psychologist with the [facility] Comprehensive Weight Management Program.   TO REGISTER: Please send an email to Obed Davenport, Ph.D., LP at?[email or web address]?if you would like an invitation to the group and to learn about using Microsoft Teams.    2023 Meetings  January 10: Jignesh Irving, PharmD, Pharmacy Resident, \"Medications and Bariatric Surgery\"  February 14:  March 14:  April 11:  May 9:  June 11:    [facility] Post-Operative Bariatric Surgery Support Group  This is a support group for [facility] bariatric patients (and those external to [facility]) who have had bariatric surgery and are " at least 3 months post-surgery.  WHEN: This support group meets the 4th Wednesday of the month from 11:00 AM - 12:00 PM virtually using Microsoft Teams.   FACILITATOR: Led by Certified Bariatric Nurse, Gabi Winchester RN.   TO REGISTER: Please send an email to Gabi at [email or web address] if you would like an invitation to the group and to learn about using Microsoft Teams.    2023 Meetings  January 25  February 22  March 22  April 26  May 24  Geraldine 28

## 2023-03-23 ENCOUNTER — VIRTUAL VISIT (OUTPATIENT)
Dept: ENDOCRINOLOGY | Facility: CLINIC | Age: 43
End: 2023-03-23
Payer: MEDICARE

## 2023-03-23 VITALS — HEIGHT: 76 IN | WEIGHT: 315 LBS | BODY MASS INDEX: 38.36 KG/M2

## 2023-03-23 DIAGNOSIS — E66.01 MORBID OBESITY (H): Primary | ICD-10-CM

## 2023-03-23 PROCEDURE — 99214 OFFICE O/P EST MOD 30 MIN: CPT | Mod: VID | Performed by: SURGERY

## 2023-03-23 ASSESSMENT — PAIN SCALES - GENERAL: PAINLEVEL: SEVERE PAIN (7)

## 2023-03-23 NOTE — LETTER
"3/23/2023       RE: Max Meade  [address]  [place] [facility] [place] [address]     Dear Colleague,    Thank you for referring your patient, Max Meade, to the [facility]IGHT MANAGEMENT CLINIC [place] at [facility]. Please see a copy of my visit note below.        Referring provider:       11/30/2022   Who referred you? Dr Duval     I was asked to see the patient regarding obesity by the referring provider above.    I had the pleasure of meeting with your patient Max Meade in our weight loss surgery office.  This patient is a 42 year old male who has been undergoing our thorough preoperative screening process in anticipation of potential bariatric surgery.    At initial evaluation we recorded Max Meade's Height: 193 cm (6' 4\"), Initial Weight (lbs): 667 lbs and Initial BMI: 81.19.  The patient has been unsuccessful with other methods of permanent weight loss and suffers from multiple weight related medical conditions.  Due to lack of success in achieving weight loss through other methods, he is interested in undergoing bariatric surgery.    Had diabetes at age 25 when he weighed over 450 lbs.    Currently taking blood thinners.  Takes eliquis.    Managed by Dr. Hdz and Sehrice.    PREVIOUS WEIGHT LOSS ATTEMPTS:     11/30/2022   I have tried the following methods to lose weight Physician directed program       CO-MORBIDITIES OF OBESITY INCLUDE:     11/30/2022   I have the following health issues associated with obesity: Type II Diabetes, Sleep Apnea   I have the following symptoms associated with obesity: -       VITALS:  Ht 1.93 m (6' 4\")   Wt (!) 238.6 kg (526 lb)   BMI 64.03 kg/m      PE:  GENERAL: Alert and oriented x3. NAD    RESPIRATORY: Breathing unlabored    Seen on video; patient was inpatient therapy for continued weight loss.    In summary, he has undergone an appropriate medical evaluation, dietitian " evaluation, as well as psychologic screening. The patient appears to be an appropriate candidate for bariatric surgery.    In the office today, I discussed the laparoscopic gastric sleeve surgery.  Risks, benefits and anticipated outcomes were outlined including the risk of death, staple line leak (1-2%), PE, DVT, ulcer, worsening GERD, N/V, stricture, hernia, wound infection, weight regain, and vitamin deficiencies. This patient has a good chance of sustaining permanent weight loss due to this procedure.  This should also allow improvement if not resolution of his/her weight related medical conditions.    At present we are going to present your patient's file for prior authorization to insurance.  Pending prior authorization, I anticipate a surgical date in the near future.  We will keep you updated on any progress.  If you have questions regarding care please feel free to contact me.  Total time spent in the clinic was 30 minutes with greater than 50% in face-to-face consultation.        Sincerely,    Francisco Campos MD    Please route or send letter to:  Primary Care Provider (PCP) and Referring Provider      Pt seen on video visit.    Video started at 955am; ended at 1015am              Again, thank you for allowing me to participate in the care of your patient.      Sincerely,    Francisco Campos MD

## 2023-03-23 NOTE — NURSING NOTE
"(   Chief Complaint   Patient presents with     RECHECK     Leighton phillips and iveth    )    ( Weight: (!) 238.6 kg (526 lb) (Pt reported) )  ( Height: 193 cm (6' 4\") )  ( BMI (Calculated): 64.03 )  (   )  (   )  (   )  (   )  (   )  (   )    (   )  (   )  (   )  (   )  (   )  (   )  (   )    (   Patient Active Problem List   Diagnosis     Morbid obesity (H)    )  (   Current Outpatient Medications   Medication Sig Dispense Refill     acetaminophen (TYLENOL) 500 MG tablet Take 1,000 mg by mouth       albuterol (PROVENTIL) (2.5 MG/3ML) 0.083% neb solution        apixaban ANTICOAGULANT (ELIQUIS) 2.5 MG tablet Take 2.5 mg by mouth       atorvastatin (LIPITOR) 10 MG tablet Take 10 mg by mouth       bumetanide (BUMEX) 1 MG tablet Take 1 mg by mouth       cholecalciferol 25 MCG (1000 UT) TABS Take 25 mcg by mouth       DULoxetine (CYMBALTA) 60 MG capsule TAKE 2 CAPSULES BY MOUTH ONCE DAILY       famotidine (PEPCID) 20 MG tablet Take 20 mg by mouth       gabapentin (NEURONTIN) 300 MG capsule Take 300 mg by mouth       hydrOXYzine (VISTARIL) 25 MG capsule Take 25 mg by mouth       insulin aspart (NOVOLOG VIAL) 100 UNITS/ML vial Inject 24 Units Subcutaneous       insulin glargine (LANTUS VIAL) 100 UNIT/ML vial Inject 47 Units Subcutaneous       insulin lispro (HUMALOG VIAL) 100 UNIT/ML vial Inject 2-10 Units Subcutaneous       lactulose (CHRONULAC) 10 GM/15ML solution Take 60 mLs by mouth       Magnesium Oxide 500 MG TABS Take 500 mg by mouth       melatonin 3 MG tablet Take 3 mg by mouth       mineral oil-hydrophilic petrolatum (AQUAPHOR) external ointment        Multiple Vitamins-Minerals (MULTI VITAMIN  /MINERALS) TABS Take 1 tablet by mouth       pantoprazole (PROTONIX) 40 MG EC tablet Take 40 mg by mouth       senna-docusate (SENOKOT-S/PERICOLACE) 8.6-50 MG tablet Take 2 tablets by mouth       zolpidem (AMBIEN) 5 MG tablet Take 5 mg by mouth       busPIRone (BUSPAR) 10 MG tablet Take 10 mg by mouth       naloxone (NARCAN) 4 " MG/0.1ML nasal spray Spray 1 spray (4 mg) into one nostril 1 time; may repeat in opposite nostril in 2 minutes if person does not respond.       topiramate (TOPAMAX) 25 MG tablet Take 25 mg by mouth      )  ( Diabetes Eval:    )    ( Pain Eval:  Severe Pain (7) )    ( Wound Eval:       )    (   History   Smoking Status     Never   Smokeless Tobacco     Never    )    ( Signed By:  Kevon Castañeda, EMT; March 23, 2023; 9:19 AM )

## 2023-03-23 NOTE — PROGRESS NOTES
"    Referring provider:       11/30/2022   Who referred you? Dr Duval     I was asked to see the patient regarding obesity by the referring provider above.    I had the pleasure of meeting with your patient Max Meade in our weight loss surgery office.  This patient is a 42 year old male who has been undergoing our thorough preoperative screening process in anticipation of potential bariatric surgery.    At initial evaluation we recorded Max Meade's Height: 193 cm (6' 4\"), Initial Weight (lbs): 667 lbs and Initial BMI: 81.19.  The patient has been unsuccessful with other methods of permanent weight loss and suffers from multiple weight related medical conditions.  Due to lack of success in achieving weight loss through other methods, he is interested in undergoing bariatric surgery.    Had diabetes at age 25 when he weighed over 450 lbs.    Currently taking blood thinners.  Takes eliquis.    Managed by Dr. Hdz and Sherice.    PREVIOUS WEIGHT LOSS ATTEMPTS:     11/30/2022   I have tried the following methods to lose weight Physician directed program       CO-MORBIDITIES OF OBESITY INCLUDE:     11/30/2022   I have the following health issues associated with obesity: Type II Diabetes, Sleep Apnea   I have the following symptoms associated with obesity: -       VITALS:  Ht 1.93 m (6' 4\")   Wt (!) 238.6 kg (526 lb)   BMI 64.03 kg/m      PE:  GENERAL: Alert and oriented x3. NAD    RESPIRATORY: Breathing unlabored    Seen on video; patient was inpatient therapy for continued weight loss.    In summary, he has undergone an appropriate medical evaluation, dietitian evaluation, as well as psychologic screening. The patient appears to be an appropriate candidate for bariatric surgery.    In the office today, I discussed the laparoscopic gastric sleeve surgery.  Risks, benefits and anticipated outcomes were outlined including the risk of death, staple line leak (1-2%), PE, DVT, ulcer, worsening GERD, " N/V, stricture, hernia, wound infection, weight regain, and vitamin deficiencies. This patient has a good chance of sustaining permanent weight loss due to this procedure.  This should also allow improvement if not resolution of his/her weight related medical conditions.    At present we are going to present your patient's file for prior authorization to insurance.  Pending prior authorization, I anticipate a surgical date in the near future.  We will keep you updated on any progress.  If you have questions regarding care please feel free to contact me.  Total time spent in the clinic was 30 minutes with greater than 50% in face-to-face consultation.        Sincerely,    Francisco Campos MD    Please route or send letter to:  Primary Care Provider (PCP) and Referring Provider      Pt seen on video visit.    Video started at 955am; ended at 1015am

## 2023-03-23 NOTE — LETTER
Date:March 24, 2023      Provider requested that no letter be sent. Do not send.       [facility]

## 2023-03-24 ENCOUNTER — MEDICAL CORRESPONDENCE (OUTPATIENT)
Dept: HEALTH INFORMATION MANAGEMENT | Facility: CLINIC | Age: 43
End: 2023-03-24
Payer: MEDICARE

## 2023-03-27 ENCOUNTER — VIRTUAL VISIT (OUTPATIENT)
Dept: ENDOCRINOLOGY | Facility: CLINIC | Age: 43
End: 2023-03-27
Payer: MEDICARE

## 2023-03-27 VITALS — HEIGHT: 76 IN | WEIGHT: 315 LBS | BODY MASS INDEX: 38.36 KG/M2

## 2023-03-27 DIAGNOSIS — E66.01 MORBID OBESITY (H): Primary | ICD-10-CM

## 2023-03-27 PROCEDURE — 99213 OFFICE O/P EST LOW 20 MIN: CPT | Mod: VID | Performed by: INTERNAL MEDICINE

## 2023-03-27 ASSESSMENT — PAIN SCALES - GENERAL: PAINLEVEL: SEVERE PAIN (7)

## 2023-03-27 NOTE — PROGRESS NOTES
"Max Meade is a 42 year old male who is being evaluated via a billable video visit.      The patient has been notified of following:     \"This video visit will be conducted via a call between you and your physician/provider. We have found that certain health care needs can be provided without the need for an in-person physical exam.  This service lets us provide the care you need with a video conversation.  If a prescription is necessary we can send it directly to your pharmacy.  If lab work is needed we can place an order for that and you can then stop by our lab to have the test done at a later time.    Video visits are billed at different rates depending on your insurance coverage.  Please reach out to your insurance provider with any questions.    If during the course of the call the physician/provider feels a video visit is not appropriate, you will not be charged for this service.\"    Patient has given verbal consent for Video visit? Yes  How would you like to obtain your AVS? MyChart  Will anyone else be joining your video visit? No  {If patient encounters technical issues they should call [phone]      Video-Visit Details    Type of service:  Video Visit    Video Start Time: 10:30 AM   Video End Time: 11:04 AM    Originating Location (pt. Location): Home    Distant Location (provider location):  [facility] [place]     Platform used for Video Visit: [employer]    During this virtual visit the patient is located in [place], patient verifies this as the location during the entirety of this visit.     Pt signed Medicare ABN form which is good for 1 year (8/29/22 - 8/29/23). Scanned into chart 8/30/22.      Weight Management Nutrition Consultation    Max Meade is a 42 year old male presents today for return weight management nutrition consultation.  Patient referred by Dr. Hdz on December 27, 2021.    Patient with Co-morbidities of obesity including:  Type II DM " "yes   Renal Failure no  Sleep apnea yes  Hypertension no   Dyslipidemia no  Joint pain yes  Back pain yes  GERD no     Anthropometrics:  He reports checking weight weekly    Current weight (pt report): 526 lbs (-1 lbs over the past 1 month, -141 lbs from initial consult weight of 667 lbs)    Weight history (per pt report):  3/28/22: 631 lbs   2/14/22: 648 lbs   2/23/22: 642 lbs   1/24/22: 648 lbs    Estimated body mass index is 81.19 kg/m  as calculated from the following:    Height as of an earlier encounter on 12/27/21: 1.93 m (6' 4\").    Weight as of an earlier encounter on 12/27/21: 302.5 kg (667 lb).    Per notes from [facility] Bariatric RD:  12/13/21 (!) 293.8 kg (647 lb 12.8 oz)   12/08/21 (!) 287.9 kg (634 lb 12.8 oz)   09/27/21 (!) 292.2 kg (644 lb 3.2 oz)     Admitted in March 2021. Notes he started working on weight loss at this time. Weight per care everywhere:   321 kg (707 lb 10.8 oz) 03/18/2021        Medications for Weight Loss:  Ozempic  Topiramate    NUTRITION HISTORY  See previous RD notes for full nutrition history.  Per questionnaire, strong emotional component to eating. Readiness and confidence to change, rated 10 (very ready).  Saw [facility] bariatric RD 6 times, last visit 12/16/21.     Currently admitted (since March 2021) at [facility] in [facility] and working with inpatient RD on weight loss.  He is getting 3 meals and 2 snack per day from the hospital.     Does not tolerate regular milk.     9/26/22 - Met with Ortho for knee pain and received corticosteroid injection.     RD visit 11/17/22 - Finding shakes very helpful for weight loss. Reports not having shakes for a few days while they were out at the hospital. Once he restarted he reports losing 7 lbs.     12/1/22 - Met with bariatric surgery PA to discuss requirements of weight loss surgery.     Visit with Dr. Gonzalez 2/13 for bariatric psych eval - Mr. Meade has been in the hospital for the last year and a half, " but is now preparing to be discharged to his own apartment in about a month.  He has been approved for 35 hours a week with a PCA, and he has a CADI waiver.  He will have a CNA coming in 3-4 times a week, and an RN to set up his medications for him.  He will have housekeeping assistance for light cleaning.  His bathroom has been made handicapped accessible, so he believes that he has all the tools and people in place so that he can succeed.    Last RD visit 2/23/23 - Made some changes to dinner options, was getting sick of same meal. Changes keep caloric intake to 0654-9630 ronna/day. Doing exercising with PT 4 times per week for 30-60 mins. Walking progressing, reports being able to go 30-40 ft at a time, is able to ambulate and sit on commode without assistance. He reports working towards discharge this month to independent living apartment, where he will have home care.     Hgb A1c 8.2 on 2/19/23, improved from 8.9 on 12/3/22 8.9.     Today - Had changed to hamburger hotdish at dinner, switching back to sandwich. No longer using oxycodone for pain, and stopped laxative use. BMs have been somewhat regular.  Had a tooth ache last week, did not get to the gym last week. Did restart going this past week. Is up to walking 50 ft at a time now.  Working towards discharge this month, PCA last item left to be set up prior to discharge.  Reports hospital is sending him home with hand bike, medicine ball and will have other exercise equipment at home as well. Reports he will have home PT 3x/wk, which will help him stay accountable with exercise. He has started to plan what he will do for diet at home as well - avoiding/limting processed foods, spray oil instead of butter, lean proteins and continued premiere protein shakes.       1302-4163 calories/day (30 gm pro/meal, <60 gm CHO/meal, <15 gm CHO/snack):  Breakfast: 10 am protein shake + english muffin + 1 pc sausage + egg   12 pm snack: tuna sandwich and fruit   Lunch 2pm:  protein shake with watermelon/grapes and SF jello  4pm snack: 70 ronna popcorn (white cheddar)  Dinner: 6 pm hamburger helper and green beans and protein shake and SF jello   10 pm: Greek yogurt and honey and string cheese - taking with medication   Beverages: water/crystal lite (5-6 x 8 oz/day), one coke zero daily.    Progress Towards Previous Goals:  1) Follow 1400 calorie/day plan - Met, continues   - Continue to include low-calorie protein shakes with 2 meals and snacks as needed daily.  2) Aim for less than 60 gm carb per meal and less than 15 gm carb per snack  Met, continues  3) Eat very slowly, chewing thoroughly. Take 20-30 mins to eat.  Met, continues  4) Work to maintain consistency with exercise sessions. Keep up with walking routine with PT.  Met, continues    Physical Activity:  PT/OT and exercise 4 times per week for 30 mins and doing seated and now standing exercises.    Nutrition Prescription  Recommended energy/nutrient modification.  1500 calories/day or less (per MD)    Nutrition Diagnosis  Obesity r/t long history of positive energy balance aeb BMI >30. - improving/continues    Nutrition Intervention  Materials/education provided:  - Reviewed progress towards previous goals  - Reviewed dietary strategy for weight loss and management of type 2 DM - continuing hypocaloric and low/moderate carb meal plan as established.   - Discussed need to be mobile for surgery. Encouraged consistency with exercise sessions and PT. Praised pt on the progress he has made with thus far.    - Reviewed bariatric post-op diet guidelines.  - Answered pt nutrition related questions.    - Reviewed surgery task list - pt to work on obtaining clearance from Sleep medicine, and letters of support from primary care and therapist.   - Discussed strategies for accountability with diet at home - planning meals/snacks ahead of time, limiting snacks in the house, and grocery shopping online to stick to the plan. He plans to adhere  to most of the diet plan provide during hospitalization. Discussed increasing variety with fruit/vegetable options.   - Provided pt with list of goals and resources below via AVS/Secucloudhart    Patient demonstrates understanding.    Expected Engagement: good    Nutrition Goals  1) Follow 1400 calorie/day plan   - Continue to include low-calorie protein shakes with 2 meals and snacks as needed daily.  2) Aim for less than 60 gm carb per meal and less than 15 gm carb per snack  3) Eat very slowly, chewing thoroughly. Take 20-30 mins to eat.   4) Work to maintain consistency with exercise sessions. Keep up with walking routine with PT.   5) Once home:   - stock up on variety of lean protein, fruits and vegetables   - Avoid packaged/processed snacks and calorie-containing beverages   - Use spray oil for cooking   - Stock up on Premier Protein shakes     Meal Replacement Shake Options:   *Protein Shake Criteria: no more than 210 Calories, at least 20 grams of protein, and less than 10 grams of sugar   [facility] smoothie (160 Calories, 20 g protein)   Premier Protein (160 Calories, 30 g protein)  Slim Fast Advanced Nutrition (180 Calories, 20 g protein)  Muscle Milk, lactose-free, 17 oz bottle (210 Calories, 30 g protein)  Integrated Supplements, no artificial sugars (110 Calories, 20 g protein)  Genepro, unflavored protein powder (60 Calories, 30 g protein)  Boost/Ensure Max (160 calories, 30 gm protein)   Fed Playbook Core Power (170 calories, 26 gm protein)  Aldi's Elevation Protein Powder (180 calories, 30 gm protein)     Meal Replacement Bar Options:  [facility] Protein Shake (160 Calories, 15 g protein)  Quest Protein Bars (190 Calories, 20 g protein)  Built Bar (170 Calories, 15-20 g protein)  One Protein Bar (210 calories, 20 g protein)  [place] Signature Protein Bar ([employer]) (190 Calories, 21 g protein)  Pure Protein Bars (180 Calories, 21 g protein)    Low Calorie Frozen Meal:  Healthy Choice Power Bowls  Lean  "Mary Eisenberg      Protein Sources for Weight Loss  [email or web address]     Carbohydrate Counting  [email or web address]       Post-op Diet Handouts:  Diet Guidelines after Weight-loss Surgery  [email or web address]     Your Stage 1 Diet: Clear Liquids  [email or web address]     Your Stage 2 Diet: Low-fat Full Liquids  [email or web address]     Your Stage 3 Diet: Pureed Foods  [email or web address]     Pureed Pleasures  [email or web address]    Your Stage 4 Diet: Soft Foods  [email or web address]    Your Stage 5 Diet: Regular Foods  [email or web address]    Supplements after Sleeve Gastrectomy, Gastric Bypass or Single Anastomosis Duodenal Switch  [email or web address]    Keeping Track of Fluids  [email or web address]    Tools for after surgery:  Adapteva Dish Sets: bariatric meal size bowls and plates with built in measurement designs on the dishes    Bariatric Pal: https://store.[employer].com/collections/bariatric-dinnerware    Books:  \"Fresh Start Bariatric Cookbook\" by Matilde Armenta, MS, RDN, CD - Excellent cookbook with post-op recipes and many other resources to check out for ongoing support after surgery    \"Eating Well After Weight Loss Surgery\" by Leatha Mendoza and Jorge Alberto Chowdhury - Great cookbook with recipes for each diet stage after surgery and recommended portion sizes. Slightly more intensive cooking recipes.    \"Bariatric Mindset Success\" by Priscila Valle - book that helps with prevention of weight regain after surgery. Helps train your brain for long term success with weight loss after surgery.    Apps:  Baritastic jodi -  fluid and nutritional tracking. Also has bariatric recipes.     Post-Bariatric Surgery Online Recipe Resources:  Online:    Simpler Recipes: " https://www.[employer]".com/bariatric-surgery/recipes    https://www.Metavana.com/bariatric-recipes/    https://bariatricmealVelteo.com/bariatric-recipes/     Some recipes on this site have larger than 1 cup portion size pictures: http://www.[facility].org/weight-loss-surgery/nutrition/recipe-corner.html     https://www.Ocean[employer].me/25-bariatric-friendly-weeknight-meals/    Crockpot recipes: https://www.Ocean's Halo.me/25-bariatric-friendly-crockpot-recipes/    https://[employer]/recipes/     https://www.[employer].Infused Medical Technology/mbd-recipes    The World According to IQuum Blog: https://theworldaccordingtoeggface.CraigsBlueBook.com/        Follow-Up:  4/28/23    Time spent with patient: 34 minutes.  Blossom Caal RD, LD

## 2023-03-27 NOTE — LETTER
"3/27/2023       RE: Max Meade  [address]  [place] [address]     Dear Colleague,    Thank you for referring your patient, Max Meade, to the [facility] [place] at [facility]. Please see a copy of my visit note below.    Max is a 42 year old who is being evaluated via a billable video visit.      How would you like to obtain your AVS? MyChart  If the video visit is dropped, the invitation should be resent by: Text to cell phone: [phone]  Will anyone else be joining your video visit? No    Video-Visit Details    Type of service:  Video Visit   Joined the call at 3/27/2023, 10:00:51 am.  Left the call at 3/27/2023, 10:08:17 am.    Originating Location (pt. Location): Home    Distant Location (provider location):  Off-site  Platform used for Video Visit: [facility] Weight Management Note     Max Meade  MRN:  [id number]  :  1980  JOSEFINA:  20    Dear Colleagues,    I had the pleasure of seeing your patient Max Meade.  He is a 41 year old male who I am continuing to see for treatment of obesity related to:     2022   I have the following health issues associated with obesity: Type II Diabetes, Sleep Apnea   I have the following symptoms associated with obesity: -     CURRENT WEIGHT:   526 lbs 0 oz     Wt Readings from Last 4 Encounters:   23 (!) 238.6 kg (526 lb)   23 (!) 238.6 kg (526 lb)   22 (!) 248.1 kg (547 lb)   22 (!) 250.8 kg (553 lb)     Height:  6' 4\"  Body Mass Index:  Body mass index is 64.03 kg/m .  Vitals:  B/P: Data Unavailable, P: Data Unavailable    Initial consult weight was 667 on down 11.  Weight change since last seen on 22 is down 24 pounds.   Total loss is 141 pounds.    INTERVAL HISTORY:  Says following 1300 calories diet in hospital and denies outside food. Maintains semaglutide 2 mg/week. " Topiramate is 75 mg BID. Thinks will return home in perhaps 2 weeks as walking better. Maintains that he can continue diet of 1300 on his own at home and take medication.    Diet Recall Review with Patient 12/21/2021   Do you typically eat breakfast? Yes   If you do eat breakfast, what types of food do you eat? Dutch toast,butler, sausage, eggs   Do you typically eat lunch? Yes   If you do eat lunch, what types of food do you typically eat? Tuna sandwich   Do you typically eat supper? Yes   If you do eat supper, what types of food do you typically eat? Chicken [place]   Do you typically eat snacks? No   Do you like vegetables?  No   Do you drink water? Yes   How many glasses of juice do you drink in a typical day? 0   How many of glasses of milk do you drink in a typical day? 0   If you do drink milk, what type? N/A   How many 8oz glasses of sugar containing drinks such as Simba-Aid/sweet tea do you drink in a day? 0   How many cans/bottles of sugar pop/soda/tea/sports drinks do you drink in a day? 0   How many cans/bottles of diet pop/soda/tea or sports drink do you drink in a day? 7   How often do you have a drink of alcohol? Never     MEDICATIONS:   Current Outpatient Medications   Medication     acetaminophen (TYLENOL) 500 MG tablet     albuterol (PROVENTIL) (2.5 MG/3ML) 0.083% neb solution     apixaban ANTICOAGULANT (ELIQUIS) 2.5 MG tablet     atorvastatin (LIPITOR) 10 MG tablet     bumetanide (BUMEX) 1 MG tablet     cholecalciferol 25 MCG (1000 UT) TABS     DULoxetine (CYMBALTA) 60 MG capsule     famotidine (PEPCID) 20 MG tablet     gabapentin (NEURONTIN) 300 MG capsule     hydrOXYzine (VISTARIL) 25 MG capsule     insulin aspart (NOVOLOG VIAL) 100 UNITS/ML vial     insulin glargine (LANTUS VIAL) 100 UNIT/ML vial     insulin lispro (HUMALOG VIAL) 100 UNIT/ML vial     lactulose (CHRONULAC) 10 GM/15ML solution     Magnesium Oxide 500 MG TABS     melatonin 3 MG tablet     mineral oil-hydrophilic petrolatum  (AQUAPHOR) external ointment     Multiple Vitamins-Minerals (MULTI VITAMIN  /MINERALS) TABS     pantoprazole (PROTONIX) 40 MG EC tablet     senna-docusate (SENOKOT-S/PERICOLACE) 8.6-50 MG tablet     zolpidem (AMBIEN) 5 MG tablet     busPIRone (BUSPAR) 10 MG tablet     naloxone (NARCAN) 4 MG/0.1ML nasal spray     topiramate (TOPAMAX) 25 MG tablet     No current facility-administered medications for this visit.     Weight Loss Medication History Reviewed With Patient 3/27/2023   Which weight loss medications are you currently taking on a regular basis? Ozempic, Topamax (topiramate)   Are you having any side effects from the weight loss medication that we have prescribed you? -   If you are having side effects please describe: Nausea     ASSESSMENT:   Maintain 1350 calories diet in hospital and semaglutide 2 mg/week and maintain topiramate 75 BID. He is hoping to get walking to qualify for barisurgery, doing regular PT.    FOLLOW-UP:    12 weeks.    Sincerely,  Erlin Hdz MD      Again, thank you for allowing me to participate in the care of your patient.      Sincerely,    Erlin Hdz MD

## 2023-03-27 NOTE — NURSING NOTE
"Chief Complaint   Patient presents with     RECHECK     4 month follow up       Vitals:    03/27/23 0904   Weight: (!) 238.6 kg (526 lb)   Height: 1.93 m (6' 4\")       Body mass index is 64.03 kg/m .          SHALA OLSON EMT    "

## 2023-03-27 NOTE — PROGRESS NOTES
Max is a 42 year old who is being evaluated via a billable video visit.      How would you like to obtain your AVS? "[employer]  If the video visit is dropped, the invitation should be resent by: Text to cell phone: [phone]  Will anyone else be joining your video visit? No    Video-Visit Details    Type of service:  Video Visit   Joined the call at 3/27/2023, 10:00:51 am.  Left the call at 3/27/2023, 10:08:17 am.    Originating Location (pt. Location): Home    Distant Location (provider location):  Off-site  Platform used for Video Visit: [employer]

## 2023-03-27 NOTE — PROGRESS NOTES
"    Return Medical Weight Management Note     Max Meade  MRN:  [id number]  :  1980  JOSEFINA:  22    Dear Colleagues,    I had the pleasure of seeing your patient Max Meade.  He is a 41 year old male who I am continuing to see for treatment of obesity related to:     2022   I have the following health issues associated with obesity: Type II Diabetes, Sleep Apnea   I have the following symptoms associated with obesity: -     CURRENT WEIGHT:   526 lbs 0 oz     Wt Readings from Last 4 Encounters:   23 (!) 238.6 kg (526 lb)   23 (!) 238.6 kg (526 lb)   22 (!) 248.1 kg (547 lb)   22 (!) 250.8 kg (553 lb)     Height:  6' 4\"  Body Mass Index:  Body mass index is 64.03 kg/m .  Vitals:  B/P: Data Unavailable, P: Data Unavailable    Initial consult weight was 667 on down 11.  Weight change since last seen on 22 is down 24 pounds.   Total loss is 141 pounds.    INTERVAL HISTORY:  Says following 1300 calories diet in hospital and denies outside food. Maintains semaglutide 2 mg/week. Topiramate is 75 mg BID. Thinks will return home in perhaps 2 weeks as walking better. Maintains that he can continue diet of 1300 on his own at home and take medication.    Diet Recall Review with Patient 2021   Do you typically eat breakfast? Yes   If you do eat breakfast, what types of food do you eat? Armenian toast,butler, sausage, eggs   Do you typically eat lunch? Yes   If you do eat lunch, what types of food do you typically eat? Tuna sandwich   Do you typically eat supper? Yes   If you do eat supper, what types of food do you typically eat? Chicken Scheller   Do you typically eat snacks? No   Do you like vegetables?  No   Do you drink water? Yes   How many glasses of juice do you drink in a typical day? 0   How many of glasses of milk do you drink in a typical day? 0   If you do drink milk, what type? N/A   How many 8oz glasses of sugar containing drinks such as Simba-Aid/sweet " tea do you drink in a day? 0   How many cans/bottles of sugar pop/soda/tea/sports drinks do you drink in a day? 0   How many cans/bottles of diet pop/soda/tea or sports drink do you drink in a day? 7   How often do you have a drink of alcohol? Never     MEDICATIONS:   Current Outpatient Medications   Medication     acetaminophen (TYLENOL) 500 MG tablet     albuterol (PROVENTIL) (2.5 MG/3ML) 0.083% neb solution     apixaban ANTICOAGULANT (ELIQUIS) 2.5 MG tablet     atorvastatin (LIPITOR) 10 MG tablet     bumetanide (BUMEX) 1 MG tablet     cholecalciferol 25 MCG (1000 UT) TABS     DULoxetine (CYMBALTA) 60 MG capsule     famotidine (PEPCID) 20 MG tablet     gabapentin (NEURONTIN) 300 MG capsule     hydrOXYzine (VISTARIL) 25 MG capsule     insulin aspart (NOVOLOG VIAL) 100 UNITS/ML vial     insulin glargine (LANTUS VIAL) 100 UNIT/ML vial     insulin lispro (HUMALOG VIAL) 100 UNIT/ML vial     lactulose (CHRONULAC) 10 GM/15ML solution     Magnesium Oxide 500 MG TABS     melatonin 3 MG tablet     mineral oil-hydrophilic petrolatum (AQUAPHOR) external ointment     Multiple Vitamins-Minerals (MULTI VITAMIN  /MINERALS) TABS     pantoprazole (PROTONIX) 40 MG EC tablet     senna-docusate (SENOKOT-S/PERICOLACE) 8.6-50 MG tablet     zolpidem (AMBIEN) 5 MG tablet     busPIRone (BUSPAR) 10 MG tablet     naloxone (NARCAN) 4 MG/0.1ML nasal spray     topiramate (TOPAMAX) 25 MG tablet     No current facility-administered medications for this visit.     Weight Loss Medication History Reviewed With Patient 3/27/2023   Which weight loss medications are you currently taking on a regular basis? Ozempic, Topamax (topiramate)   Are you having any side effects from the weight loss medication that we have prescribed you? -   If you are having side effects please describe: Nausea     ASSESSMENT:   Maintain 1350 calories diet in hospital and semaglutide 2 mg/week and maintain topiramate 75 BID. He is hoping to get walking to qualify for  annette, doing regular PT.    FOLLOW-UP:    12 weeks.    Sincerely,  Erlin Hdz MD

## 2023-03-27 NOTE — LETTER
Date:March 28, 2023      Provider requested that no letter be sent. Do not send.       [facility]

## 2023-03-28 ENCOUNTER — VIRTUAL VISIT (OUTPATIENT)
Dept: ENDOCRINOLOGY | Facility: CLINIC | Age: 43
End: 2023-03-28

## 2023-03-28 DIAGNOSIS — E66.01 MORBID OBESITY (H): ICD-10-CM

## 2023-03-28 DIAGNOSIS — E11.9 TYPE 2 DIABETES MELLITUS WITHOUT COMPLICATION, WITHOUT LONG-TERM CURRENT USE OF INSULIN (H): ICD-10-CM

## 2023-03-28 DIAGNOSIS — Z71.3 NUTRITIONAL COUNSELING: Primary | ICD-10-CM

## 2023-03-28 PROCEDURE — 97803 MED NUTRITION INDIV SUBSEQ: CPT | Mod: GA | Performed by: DIETITIAN, REGISTERED

## 2023-03-28 PROCEDURE — 99207 PR NO CHARGE LOS: CPT | Mod: VID | Performed by: DIETITIAN, REGISTERED

## 2023-03-28 NOTE — PATIENT INSTRUCTIONS
Joe Santana,    Follow-up with RD on 4/28    Thank you,    Blossom Caal, RD, LD  If you would like to schedule or reschedule an appointment with the RD, please call [phone]    Nutrition Goals  1) Follow 1400 calorie/day plan   - Continue to include low-calorie protein shakes with 2 meals and snacks as needed daily.  2) Aim for less than 60 gm carb per meal and less than 15 gm carb per snack  3) Eat very slowly, chewing thoroughly. Take 20-30 mins to eat.   4) Work to maintain consistency with exercise sessions. Keep up with walking routine with PT.   5) Once home:   - stock up on variety of lean protein, fruits and vegetables   - Avoid packaged/processed snacks and calorie-containing beverages   - Use spray oil for cooking   - Stock up on Premier Protein shakes     Meal Replacement Shake Options:   *Protein Shake Criteria: no more than 210 Calories, at least 20 grams of protein, and less than 10 grams of sugar   [facility] smoothie (160 Calories, 20 g protein)   Premier Protein (160 Calories, 30 g protein)  Slim Fast Advanced Nutrition (180 Calories, 20 g protein)  Muscle Milk, lactose-free, 17 oz bottle (210 Calories, 30 g protein)  Integrated Supplements, no artificial sugars (110 Calories, 20 g protein)  Genepro, unflavored protein powder (60 Calories, 30 g protein)  Boost/Ensure Max (160 calories, 30 gm protein)   Vive Unique Core Power (170 calories, 26 gm protein)  Aldi's Elevation Protein Powder (180 calories, 30 gm protein)     Meal Replacement Bar Options:  [facility] Protein Shake (160 Calories, 15 g protein)  Quest Protein Bars (190 Calories, 20 g protein)  Built Bar (170 Calories, 15-20 g protein)  One Protein Bar (210 calories, 20 g protein)  Juares Signature Protein Bar ([employer]) (190 Calories, 21 g protein)  Pure Protein Bars (180 Calories, 21 g protein)    Low Calorie Frozen Meal:  Healthy Choice Power Bowls  Lean Cuisine  Smart Ones  Jose Eisenberg      Protein Sources for Weight  "Loss  [email or web address]     Carbohydrate Counting  [email or web address]       Post-op Diet Handouts:  Diet Guidelines after Weight-loss Surgery  [email or web address]     Your Stage 1 Diet: Clear Liquids  [email or web address]     Your Stage 2 Diet: Low-fat Full Liquids  [email or web address]     Your Stage 3 Diet: Pureed Foods  [email or web address]     Pureed Pleasures  [email or web address]    Your Stage 4 Diet: Soft Foods  [email or web address]    Your Stage 5 Diet: Regular Foods  [email or web address]    Supplements after Sleeve Gastrectomy, Gastric Bypass or Single Anastomosis Duodenal Switch  [email or web address]    Keeping Track of Fluids  [email or web address]    Tools for after surgery:  [employer] Dish Sets: bariatric meal size bowls and plates with built in measurement designs on the dishes    Bariatric Pal: https://store.ScheduleThing.com/collections/bariatric-dinnerware    Books:  \"Fresh Start Bariatric Cookbook\" by Matilde Armenta, MS, RDN, CD - Excellent cookbook with post-op recipes and many other resources to check out for ongoing support after surgery    \"Eating Well After Weight Loss Surgery\" by Leatha Mendoza and Jorge Alberto Chowdhury - Great cookbook with recipes for each diet stage after surgery and recommended portion sizes. Slightly more intensive cooking recipes.    \"Bariatric Mindset Success\" by Priscila Valle - book that helps with prevention of weight regain after surgery. Helps train your brain for long term success with weight loss after surgery.    Apps:  GetAutoBids jodi -  fluid and nutritional tracking. Also has bariatric recipes.     Post-Bariatric Surgery Online Recipe Resources:  Online:  Simpler Recipes: https://www.[employer] Generation Dance/bariatric-surgery/recipes  https://www.[employer] Dance/bariatric-recipes/  https://POPRAGEOUS.com/bariatric-recipes/   Some recipes on this site have larger than 1 " cup portion size pictures: http://www.[facility].org/weight-loss-surgery/nutrition/recipe-corner.html   https://www.Akeneo.me/25-bariatric-friendly-weeknight-meals/  Crockpot recipes: https://www.Akeneo.me/25-bariatric-friendly-crockpot-recipes/  https://meinKauf/recipes/   https://www.[employer]/mbd-recipes  The World According to Eggface Blog: https://theworldaccordingtoeggface.Stroho.[employer]/          COMPREHENSIVE WEIGHT MANAGEMENT PROGRAM  VIRTUAL SUPPORT GROUPS    For Support Group Information:      We offer support groups for patients who are working on weight loss and considering, preparing for or have had weight loss surgery.   There is no cost for this opportunity.  You are invited to attend the?Virtual Support Groups?provided by any of the following locations:    [facility] via Microsoft Teams with Sarah Merritt RN  2.   [place] via [employer] with Obed Davenport, PhD, LP  3.   [place] via [employer] with Gabi Winchester RN  4.   [facility] via Microsoft Teams with Gabi Walters [facility]    The following Support Group information can also be found on our website:  https://www.[facility].org/treatments/weight-loss-surgery-support-groups    https://www.[facility].org/treatments/weight-loss-and-weight-loss-surgery-support-groups    [facility] Weight Loss Surgery Support Group    [facility] Weight Loss Surgery Support Group  The support group is a patient-lead forum that meets monthly to share experiences, encouragement and education. It is open to those who have had weight loss surgery, are scheduled for surgery, or are considering surgery.   WHEN: This group meets on the 3rd Wednesday of each month from 5:00PM - 6:00PM virtually using Microsoft Teams.   FACILITATOR: Led by Sarah Ceja, RD, LD, RN, the program's Clinical Coordinator.   TO REGISTER: Please contact the clinic via [employer] or call the nurse line  "directly at [phone] to inform our staff that you would like an invite sent to you and to let us know the email you would like the invite sent to. Prior to the meeting, a link with directions on how to join the meeting will be sent to you.    2023 Meetings (speakers to be determined)  January 18: \"Let's Talk\" a time for the group to share.  February 15: \"Let's Talk\" a time for the group to share.  March 15: \"Let's Talk\" a time for the group to share.  April 19: \"Let's Talk\" a time for the group to share.  May 17: \"Let's Talk\" a time for the group to share.  June 21: \"Let's Talk\" a time for the group to share.    [facility] Support Groups    Connections Bariatric Care Support Group?  This is open to all pre- and post- operative bariatric surgery patients as well as their support system.   WHEN: This group meets the 2nd Tuesday of each month from 6:30 PM - 8:00 PM virtually using Microsoft Teams.   FACILITATOR: Led by Obed Davenport, Ph.D who is a Licensed Psychologist with the [facility] Comprehensive Weight Management Program.   TO REGISTER: Please send an email to Obed Davenport, Ph.D., LP at?[email or web address]?if you would like an invitation to the group and to learn about using Microsoft Teams.    2023 Meetings  January 10: Jignesh Irving, PharmD, Pharmacy Resident, \"Medications and Bariatric Surgery\"  February 14:  March 14:  April 11:  May 9:  June 11:    Connections Post-Operative Bariatric Surgery Support Group  This is a support group for [facility] bariatric patients (and those external to [facility]) who have had bariatric surgery and are at least 3 months post-surgery.  WHEN: This support group meets the 4th Wednesday of the month from 11:00 AM - 12:00 PM virtually using Microsoft Teams.   FACILITATOR: Led by Certified Bariatric Nurse, Gabi Winchester RN.   TO REGISTER: Please send an email to Gabi at [email or web address] if " "you would like an invitation to the group and to learn about using Microsoft Teams.    2023 Meetings  January 25  February 22  March 22  April 26  May 24  Geraldine 28      [facility] Healthy Lifestyle Virtual Support Group    Healthy Lifestyle Virtual Support Group?  This is 60 minutes of small group guided discussion, support and resources. All are welcome who want a healthy lifestyle.  WHEN: This group meets monthly on a Friday from 12:30 PM - 1:30 PM virtually using Microsoft Teams.   FACILITATOR: Led by National Board Certified Health and , Gabi Walters [facility].   TO REGISTER: Please send an email to Gabi [email or web address] to receive monthly invites to the group or if you have any questions about having a health .  Prior to the meeting, a link with directions on how to join the meeting will be sent to you.    2023 Meetings January 20: \"Let's Talk\" a time for the group to share  February 17: Guest Speaker, Ale Hu RD, Registered Dietician, \"Tips to Maximize Your Metabolism\"  March 17: Let's Talk\" a time for the group to share  April 14: Guest Speaker, Yola De La Fuente RD, Registered Dietician, \"Heart Health\"  May 19: \"Let's Talk\" a time for the group to share  June: To be announced.            "

## 2023-03-28 NOTE — LETTER
Date:March 28, 2023      Provider requested that no letter be sent. Do not send.       [facility]

## 2023-03-28 NOTE — LETTER
"3/28/2023       RE: Max Meade  [address]  [facility] [address]     Dear Colleague,    Thank you for referring your patient, Max Meade, to the [facility] [place] at [facility]. Please see a copy of my visit note below.    Max Meade is a 42 year old male who is being evaluated via a billable video visit.      The patient has been notified of following:     \"This video visit will be conducted via a call between you and your physician/provider. We have found that certain health care needs can be provided without the need for an in-person physical exam.  This service lets us provide the care you need with a video conversation.  If a prescription is necessary we can send it directly to your pharmacy.  If lab work is needed we can place an order for that and you can then stop by our lab to have the test done at a later time.    Video visits are billed at different rates depending on your insurance coverage.  Please reach out to your insurance provider with any questions.    If during the course of the call the physician/provider feels a video visit is not appropriate, you will not be charged for this service.\"    Patient has given verbal consent for Video visit? Yes  How would you like to obtain your AVS? MyChart  Will anyone else be joining your video visit? No  {If patient encounters technical issues they should call [phone]      Video-Visit Details    Type of service:  Video Visit    Video Start Time: 10:30 AM   Video End Time: 11:04 AM    Originating Location (pt. Location): Home    Distant Location (provider location):  [facility] [place]     Platform used for Video Visit: Orugga    During this virtual visit the patient is located in [place], patient verifies this as the location during the entirety of this visit.     Pt signed Medicare ABN form which is " "good for 1 year (8/29/22 - 8/29/23). Scanned into chart 8/30/22.      Weight Management Nutrition Consultation    Max Meade is a 42 year old male presents today for return weight management nutrition consultation.  Patient referred by Dr. Hdz on December 27, 2021.    Patient with Co-morbidities of obesity including:  Type II DM yes   Renal Failure no  Sleep apnea yes  Hypertension no   Dyslipidemia no  Joint pain yes  Back pain yes  GERD no     Anthropometrics:  He reports checking weight weekly    Current weight (pt report): 526 lbs (-1 lbs over the past 1 month, -141 lbs from initial consult weight of 667 lbs)    Weight history (per pt report):  3/28/22: 631 lbs   2/14/22: 648 lbs   2/23/22: 642 lbs   1/24/22: 648 lbs    Estimated body mass index is 81.19 kg/m  as calculated from the following:    Height as of an earlier encounter on 12/27/21: 1.93 m (6' 4\").    Weight as of an earlier encounter on 12/27/21: 302.5 kg (667 lb).    Per notes from [facility] Bariatric RD:  12/13/21 (!) 293.8 kg (647 lb 12.8 oz)   12/08/21 (!) 287.9 kg (634 lb 12.8 oz)   09/27/21 (!) 292.2 kg (644 lb 3.2 oz)     Admitted in March 2021. Notes he started working on weight loss at this time. Weight per care everywhere:   321 kg (707 lb 10.8 oz) 03/18/2021        Medications for Weight Loss:  Ozempic  Topiramate    NUTRITION HISTORY  See previous RD notes for full nutrition history.  Per questionnaire, strong emotional component to eating. Readiness and confidence to change, rated 10 (very ready).  Saw [facility] bariatric RD 6 times, last visit 12/16/21.     Currently admitted (since March 2021) at [facility] in [facility] and working with inpatient RD on weight loss.  He is getting 3 meals and 2 snack per day from the hospital.     Does not tolerate regular milk.     9/26/22 - Met with Ortho for knee pain and received corticosteroid injection.     RD visit 11/17/22 - Finding shakes very helpful for " weight loss. Reports not having shakes for a few days while they were out at the hospital. Once he restarted he reports losing 7 lbs.     12/1/22 - Met with bariatric surgery PA to discuss requirements of weight loss surgery.     Visit with Dr. Gonzalez 2/13 for bariatric psych eval - Mr. Meade has been in the hospital for the last year and a half, but is now preparing to be discharged to his own apartment in about a month.  He has been approved for 35 hours a week with a PCA, and he has a CADI waiver.  He will have a CNA coming in 3-4 times a week, and an RN to set up his medications for him.  He will have housekeeping assistance for light cleaning.  His bathroom has been made handicapped accessible, so he believes that he has all the tools and people in place so that he can succeed.    Last RD visit 2/23/23 - Made some changes to dinner options, was getting sick of same meal. Changes keep caloric intake to 5892-5350 ronna/day. Doing exercising with PT 4 times per week for 30-60 mins. Walking progressing, reports being able to go 30-40 ft at a time, is able to ambulate and sit on commode without assistance. He reports working towards discharge this month to independent living apartment, where he will have home care.     Hgb A1c 8.2 on 2/19/23, improved from 8.9 on 12/3/22 8.9.     Today - Had changed to hamburger hotdish at dinner, switching back to sandwich. No longer using oxycodone for pain, and stopped laxative use. BMs have been somewhat regular.  Had a tooth ache last week, did not get to the gym last week. Did restart going this past week. Is up to walking 50 ft at a time now.  Working towards discharge this month, PCA last item left to be set up prior to discharge.  Reports hospital is sending him home with hand bike, medicine ball and will have other exercise equipment at home as well. Reports he will have home PT 3x/wk, which will help him stay accountable with exercise. He has started to plan what he will  do for diet at home as well - avoiding/limting processed foods, spray oil instead of butter, lean proteins and continued premiere protein shakes.       9440-1281 calories/day (30 gm pro/meal, <60 gm CHO/meal, <15 gm CHO/snack):  Breakfast: 10 am protein shake + english muffin + 1 pc sausage + egg   12 pm snack: tuna sandwich and fruit   Lunch 2pm: protein shake with watermelon/grapes and SF jello  4pm snack: 70 ronna popcorn (white cheddar)  Dinner: 6 pm hamburger helper and green beans and protein shake and SF jello   10 pm: Greek yogurt and honey and string cheese - taking with medication   Beverages: water/crystal lite (5-6 x 8 oz/day), one coke zero daily.    Progress Towards Previous Goals:  1) Follow 1400 calorie/day plan - Met, continues   - Continue to include low-calorie protein shakes with 2 meals and snacks as needed daily.  2) Aim for less than 60 gm carb per meal and less than 15 gm carb per snack  Met, continues  3) Eat very slowly, chewing thoroughly. Take 20-30 mins to eat.  Met, continues  4) Work to maintain consistency with exercise sessions. Keep up with walking routine with PT.  Met, continues    Physical Activity:  PT/OT and exercise 4 times per week for 30 mins and doing seated and now standing exercises.    Nutrition Prescription  Recommended energy/nutrient modification.  1500 calories/day or less (per MD)    Nutrition Diagnosis  Obesity r/t long history of positive energy balance aeb BMI >30. - improving/continues    Nutrition Intervention  Materials/education provided:  - Reviewed progress towards previous goals  - Reviewed dietary strategy for weight loss and management of type 2 DM - continuing hypocaloric and low/moderate carb meal plan as established.   - Discussed need to be mobile for surgery. Encouraged consistency with exercise sessions and PT. Praised pt on the progress he has made with thus far.    - Reviewed bariatric post-op diet guidelines.  - Answered pt nutrition related  questions.    - Reviewed surgery task list - pt to work on obtaining clearance from Sleep medicine, and letters of support from primary care and therapist.   - Discussed strategies for accountability with diet at home - planning meals/snacks ahead of time, limiting snacks in the house, and grocery shopping online to stick to the plan. He plans to adhere to most of the diet plan provide during hospitalization. Discussed increasing variety with fruit/vegetable options.   - Provided pt with list of goals and resources below via AVS/KIDOZt    Patient demonstrates understanding.    Expected Engagement: good    Nutrition Goals  1) Follow 1400 calorie/day plan   - Continue to include low-calorie protein shakes with 2 meals and snacks as needed daily.  2) Aim for less than 60 gm carb per meal and less than 15 gm carb per snack  3) Eat very slowly, chewing thoroughly. Take 20-30 mins to eat.   4) Work to maintain consistency with exercise sessions. Keep up with walking routine with PT.   5) Once home:   - stock up on variety of lean protein, fruits and vegetables   - Avoid packaged/processed snacks and calorie-containing beverages   - Use spray oil for cooking   - Stock up on Premier Protein shakes     Meal Replacement Shake Options:   *Protein Shake Criteria: no more than 210 Calories, at least 20 grams of protein, and less than 10 grams of sugar   [facility] smoothie (160 Calories, 20 g protein)   Premier Protein (160 Calories, 30 g protein)  Slim Fast Advanced Nutrition (180 Calories, 20 g protein)  Muscle Milk, lactose-free, 17 oz bottle (210 Calories, 30 g protein)  Integrated Supplements, no artificial sugars (110 Calories, 20 g protein)  Genepro, unflavored protein powder (60 Calories, 30 g protein)  Boost/Ensure Max (160 calories, 30 gm protein)   [employer] Core Power (170 calories, 26 gm protein)  Aldi's Elevation Protein Powder (180 calories, 30 gm protein)     Meal Replacement Bar Options:  [facility] Protein  "Shake (160 Calories, 15 g protein)  Quest Protein Bars (190 Calories, 20 g protein)  Built Bar (170 Calories, 15-20 g protein)  One Protein Bar (210 calories, 20 g protein)  Juares Signature Protein Bar ([employer]) (190 Calories, 21 g protein)  Pure Protein Bars (180 Calories, 21 g protein)    Low Calorie Frozen Meal:  Healthy Choice Power Bowls  Lean Cuisine  Smart Ones  Jose Eisenberg      Protein Sources for Weight Loss  [email or web address]     Carbohydrate Counting  [email or web address]       Post-op Diet Handouts:  Diet Guidelines after Weight-loss Surgery  [email or web address]     Your Stage 1 Diet: Clear Liquids  [email or web address]     Your Stage 2 Diet: Low-fat Full Liquids  [email or web address]     Your Stage 3 Diet: Pureed Foods  [email or web address]     Pureed Pleasures  [email or web address]    Your Stage 4 Diet: Soft Foods  [email or web address]    Your Stage 5 Diet: Regular Foods  [email or web address]    Supplements after Sleeve Gastrectomy, Gastric Bypass or Single Anastomosis Duodenal Switch  [email or web address]    Keeping Track of Fluids  [email or web address]    Tools for after surgery:  Golden Gekko Dish Sets: bariatric meal size bowls and plates with built in measurement designs on the dishes    Bariatric Pal: https://store.bariatricpal.com/collections/bariatric-dinnerware    Books:  \"Fresh Start Bariatric Cookbook\" by Matilde Armenta, MS, RDN, CD - Excellent cookbook with post-op recipes and many other resources to check out for ongoing support after surgery    \"Eating Well After Weight Loss Surgery\" by Leatha Mendoza and Jorge Alberto Chowdhury - Great cookbook with recipes for each diet stage after surgery and recommended portion sizes. Slightly more intensive cooking recipes.    \"Bariatric Mindset Success\" by Priscila Valle - book that helps with prevention of weight regain after surgery. Helps train " your brain for long term success with weight loss after surgery.    Apps:  Kannuu jodi -  fluid and nutritional tracking. Also has bariatric recipes.     Post-Bariatric Surgery Online Recipe Resources:  Online:    Simpler Recipes: https://www.D[employer]o [employer]/bariatric-surgery/recipes    https://www.BonzerDarg.Blipify/bariatric-recipes/    https://bariatricmealPhotolitec.Blipify/bariatric-recipes/     Some recipes on this site have larger than 1 cup portion size pictures: http://www.[facility].org/weight-loss-surgery/nutrition/recipe-corner.html     https://www.Watchful Software.me/25-bariatric-friendly-weeknight-meals/    Crockpot recipes: https://www.Watchful Software.me/25-bariatric-friendly-crockpot-recipes/    https://YeahMobi/recipes/     https://www.Interior Define.Blipify/mbd-recipes    The World According to Pixonic Blog: https://theworldaccordingtoeggface.Easy Home Solutions.Blipify/        Follow-Up:  4/28/23    Time spent with patient: 34 minutes.  Blossom Caal RD, LD      Again, thank you for allowing me to participate in the care of your patient.      Sincerely,    Blossom Caal RD

## 2023-04-12 ENCOUNTER — CARE COORDINATION (OUTPATIENT)
Dept: ENDOCRINOLOGY | Facility: CLINIC | Age: 43
End: 2023-04-12
Payer: MEDICARE

## 2023-04-12 ENCOUNTER — TELEPHONE (OUTPATIENT)
Dept: ENDOCRINOLOGY | Facility: CLINIC | Age: 43
End: 2023-04-12
Payer: MEDICARE

## 2023-04-12 ENCOUNTER — PREP FOR PROCEDURE (OUTPATIENT)
Dept: ENDOCRINOLOGY | Facility: CLINIC | Age: 43
End: 2023-04-12
Payer: MEDICARE

## 2023-04-12 DIAGNOSIS — E66.01 MORBID OBESITY (H): ICD-10-CM

## 2023-04-12 DIAGNOSIS — E11.9 DIABETES MELLITUS, TYPE 2 (H): Primary | ICD-10-CM

## 2023-04-12 DIAGNOSIS — E66.01 MORBID OBESITY (H): Primary | ICD-10-CM

## 2023-04-12 DIAGNOSIS — Z01.818 PREOPERATIVE TESTING: ICD-10-CM

## 2023-04-12 RX ORDER — CEFAZOLIN SODIUM IN 0.9 % NACL 3 G/100 ML
3 INTRAVENOUS SOLUTION, PIGGYBACK (ML) INTRAVENOUS
Status: CANCELLED | OUTPATIENT
Start: 2023-04-12

## 2023-04-12 RX ORDER — ONDANSETRON 2 MG/ML
4 INJECTION INTRAMUSCULAR; INTRAVENOUS
Status: CANCELLED | OUTPATIENT
Start: 2023-04-12

## 2023-04-12 RX ORDER — CEFAZOLIN SODIUM IN 0.9 % NACL 3 G/100 ML
3 INTRAVENOUS SOLUTION, PIGGYBACK (ML) INTRAVENOUS SEE ADMIN INSTRUCTIONS
Status: CANCELLED | OUTPATIENT
Start: 2023-04-12

## 2023-04-12 RX ORDER — ACETAMINOPHEN 325 MG/1
975 TABLET ORAL ONCE
Status: CANCELLED | OUTPATIENT
Start: 2023-04-12 | End: 2023-04-12

## 2023-04-12 RX ORDER — ENOXAPARIN SODIUM 100 MG/ML
40 INJECTION SUBCUTANEOUS
Status: CANCELLED | OUTPATIENT
Start: 2023-04-12

## 2023-04-12 NOTE — PROGRESS NOTES
Tasklist updated and sent to patient via [employer].  Bariatric Task List  Fax:  Please fax all paperwork to: [phone] -     Status:  Is patient a candidate for bariatric surgery?:  patient is a candidate for bariatric surgery -     Cleared to schedule surgeon consult?:  cleared to schedule surgeon consult - 3/23/23 Dr Beth spain. [facility]   Status:  surgery evaluation in process -     Surgeon: Beth -     Tentative surgery month/year: TBD -  6/13/23, pending sleep clearance and primary care provider support.       Insurance: Insurance:  Medicare -        Patient Info: Initial Weight:  667 -     Date of Initial Weight/Height:    - 12/21   Goal Weight (lbs):  567 -     Required Weight Loss:    -     Surgery Type:  sleeve gastrectomy - Discussed with Dr Campos. s      Dietician Visits: Structured weight loss required by insurance?:  structured weight loss required -     Dietician Visit 1:  Completed - 1/3/23 done. s   Dietician Visit 2:  Completed - 2/23/23 done. s   Dietician Visit 3:  Completed - 3/28/23 done. s   Dietician Visit 4:    -     Dietician Visit 5:    -     Dietician Visit additional:  Needed - Continue monthly until surgery for weight loss and postop diet teaching. [facility] [phone] to schedule. [facility]   Dietician Notes:  RD visits w/in last year: 3/25/22, 5/26/22, 7/5/22, 8/29/22, 9/26/22, 11/17/22 -        Psychological Evaluation: Psych eval:  Completed - Dr. Gonzalez 2/13/23, recommending letter of support from therapist - KB   Therapist letter of support:  Completed - 3/24 Fabiola Vallecillo PsyD, LP - KB      Lab Work: Complete Blood Count:  Completed - Done 12/3/22 - KB   Comprehensive Metabolic Panel:  Completed - Done 12/3/22 - KB   Vitamin D:  Completed - Done 12/3/22, WNL - KB   PTH:  Completed - Done 12/3/22, WNL - KB   Hgb A1c:  Needed - 8.9 on 12/3/22, 8.2 on 2/19/23, recheck needed 5/1/23 - KB    Lipids: Completed - Done 12/3/22 - KB      Consults/ Clearance Sleep Medicine:  Needed - 4/12/23  "Pending from Dr Harrington in [place]. [facility]      PCP: Establish care with PCP:  Needed -     Follow up with PCP:    -     PCP letter of support:  Needed - 4/12/23 Pending from Dr Harrington in [place]. [facility]      Patient Education:  Information Session:  Completed -     Given \"Making your decision\" handout?:  Yes -     Given \"A Roadmap to you Weight Loss Surgery\" handout?: Yes -     Given support group information?:    -  yes   Attended support group?:    -     Support plan in place?:  Needed - need to make sure family support after surgery. Will have PCA, home health aid and nurse.      Additional Surgery Requirements: Review Coag plan:    -     HgA1c <8:  Needed - Improving, 8.2 at last check on 2/19/23 - KB   Other: Call Center to schedule postop appts for one week and 31+ days. All to be virtual, will be in [place], [place]. bks -     Other:   -        Final Tasks:  Before surgery online class:  Needed -     Before surgery online class website link:  [email or web address]   After surgery online class:  Needed -     After surgery online class website link:  [email or web address]   Nurse visit per clinic:  Needed -     History and Physical per clinic:   -  PreAssessment Clinic   Final labs per clinic: Needed -        Notes: Change from Get Well Loop to the Weight Loss Surgery Epic Care Companion    Dear Max Meade,  Effective in February 2023, please register for the Weight Loss Surgery Care Companion Pathway through the [employer] mobile jodi or via web browser after you receive an invite.   Information from this care journey can help you be more successful before and after surgery, for up to one year after your surgical procedure. Your Care Companion Pathway through [employer] can also help answer any questions you might have related to the surgery.    The Pathway has 3 Phases:  Phase 1 starts when you get your Tasklist after your initial consultation with us or when you decide " to start preparing for weight loss surgery.  Phase 2 starts when your surgery is scheduled.  Phase 3 starts on the day of discharge from the hospital.  You will be started on Phase 2  A patient can only be connected to one Care Companion journey at any given time.   You can remove or re-enroll yourself from the Weight Loss Surgery Care Companion Pathway by contacting us at [phone].     Your Weight Loss Surgery Team  [facility]  Ph:[phone]     -

## 2023-04-13 ENCOUNTER — HOSPITAL ENCOUNTER (INPATIENT)
Facility: CLINIC | Age: 43
Setting detail: SURGERY ADMIT
End: 2023-04-13
Attending: SURGERY | Admitting: SURGERY
Payer: MEDICARE

## 2023-04-19 ENCOUNTER — TRANSFERRED RECORDS (OUTPATIENT)
Dept: HEALTH INFORMATION MANAGEMENT | Facility: CLINIC | Age: 43
End: 2023-04-19
Payer: MEDICARE

## 2023-04-26 ENCOUNTER — CARE COORDINATION (OUTPATIENT)
Dept: ENDOCRINOLOGY | Facility: CLINIC | Age: 43
End: 2023-04-26
Payer: MEDICARE

## 2023-04-27 NOTE — PROGRESS NOTES
"Max Medae is a 42 year old male who is being evaluated via a billable video visit.      The patient has been notified of following:     \"This video visit will be conducted via a call between you and your physician/provider. We have found that certain health care needs can be provided without the need for an in-person physical exam.  This service lets us provide the care you need with a video conversation.  If a prescription is necessary we can send it directly to your pharmacy.  If lab work is needed we can place an order for that and you can then stop by our lab to have the test done at a later time.    Video visits are billed at different rates depending on your insurance coverage.  Please reach out to your insurance provider with any questions.    If during the course of the call the physician/provider feels a video visit is not appropriate, you will not be charged for this service.\"    Patient has given verbal consent for Video visit? Yes  How would you like to obtain your AVS? MyChart  Will anyone else be joining your video visit? No  {If patient encounters technical issues they should call [phone]      Video-Visit Details    Type of service:  Video Visit    Video Start Time: 10:30 AM   Video End Time: 11:04 AM    Originating Location (pt. Location): Home    Distant Location (provider location):  [facility] [place]     Platform used for Video Visit: RETC    During this virtual visit the patient is located in [place], patient verifies this as the location during the entirety of this visit.     Pt signed Medicare ABN form which is good for 1 year (8/29/22 - 8/29/23). Scanned into chart 8/30/22.      Weight Management Nutrition Consultation    Max Meade is a 42 year old male presents today for return bariatric consultation and nutrition education regarding clear and low-fat full liquid diet for post-bariatric surgery. Pt is interested in sleeve gastrectomy with " "Dr. Campos expected surgery date on 6/13/23.  Patient referred by Dr. Hdz and Sherice Schmid PA-C.     Patient with Co-morbidities of obesity including:  Type II DM yes   Renal Failure no  Sleep apnea yes  Hypertension no   Dyslipidemia no  Joint pain yes  Back pain yes  GERD no     Anthropometrics:  Now that pt is home, he does not have a way to check/monitor his weight.     Goal weight for weight loss surgery: 557 lbs or less day of surgery     Current weight: Unknown    Last filed vital signs while admitted:   527 lb 04/11/2023 9:37 AM CDT       Initial MWM consult weight/BMI:  Estimated body mass index is 81.19 kg/m  as calculated from the following:    Height as of an earlier encounter on 12/27/21: 1.93 m (6' 4\").    Weight as of an earlier encounter on 12/27/21: 302.5 kg (667 lb).    Per notes from [facility] RD:  12/13/21 (!) 293.8 kg (647 lb 12.8 oz)   12/08/21 (!) 287.9 kg (634 lb 12.8 oz)   09/27/21 (!) 292.2 kg (644 lb 3.2 oz)     Admitted in March 2021. Notes he started working on weight loss at this time. Weight per care everywhere:   321 kg (707 lb 10.8 oz) 03/18/2021        Medications for Weight Loss:  Ozempic  Topiramate    NUTRITION HISTORY  See previous RD notes for full nutrition history.  Does not tolerate regular milk.     Pt discharged home after being admitted for 2 years. He does not know what his current weight is, but thinks he has gained weight as he described \"getting off track\" with diet since being home.  He had been doing Healthy Choice Power Bowls for dinner, then got tired of these and started ordering take out, but now intends to get back to using the low-calorie frozen meals. He reports finding these satisfying and would like to continue using. He appears dedicated to getting back to low-calorie meal plan and set goal for himself to avoid take-out.    Pt due for recheck of Hgb A1c. States he has labs drawn at home next on 5/1. Called [facility]- " (phone:[phone]), and spoke with staff member, Edmundo - obtained Fax number: [phone]. Pt is also now seeing new PCP Malou Ahn NP with [facility]. Requested letter of support from new PCP be faxed to us.       Recent Diet Recall:  Breakfast: Jose Bell bfast sandwich  Lunch: oatmeal plus protein shake   Dinner: Health Choice Power Bowl (high-protein); baked chicken; sushi   Snacks: Premier Protein Shake 2-3 times per day between meals   Beverages:  Dining Out: More since being home, something     Progress Towards Previous Goals:  1) Follow 1400 calorie/day plan - Not met since being home   - Continue to include low-calorie protein shakes with 2 meals and snacks as needed daily.  2) Aim for less than 60 gm carb per meal and less than 15 gm carb per snack - Not met since being home  3) Eat very slowly, chewing thoroughly. Take 20-30 mins to eat. - Did not discuss today  4) Work to maintain consistency with exercise sessions. Keep up with walking routine with PT. - Met, meeting with PT twice per week  5) Once home:   - stock up on variety of lean protein, fruits and vegetables - Met, pt states he does have ingredients/meal replacements needs to maintain hypocaloric diet plan.   - Avoid packaged/processed snacks and calorie-containing beverages - Not met, reports snacking on cookies at night.    - Use spray oil for cooking   - Stock up on Premier Protein shakes - Met, has a supply at home.     Physical Activity:  PT/OT and exercise 4 times per week for 30 mins and doing seated and now standing exercises.    Nutrition Prescription  Recommended energy/nutrient modification.  1500 calories/day or less (per MD)    Nutrition Diagnosis  Obesity r/t long history of positive energy balance aeb BMI >30. - improving/continues    Nutrition Intervention  Materials/education provided:  - Reviewed progress towards previous goals  - Reviewed dietary strategy for weight loss and management of type 2 DM - continuing  hypocaloric and low/moderate carb meal plan as established.   - Discussed need to be mobile for surgery. Encouraged consistency with exercise sessions and PT. Praised pt on the progress he has made with thus far.    - Provided written and verbal education on post-op diet and transition (Clear Liquid, Low-Fat Full Liquid, Pureed, Soft and Regular consistencies). Mailed handouts listed below to pt on 4/28/23.   - Answered pt nutrition related questions.    - Reviewed surgery task list - Faxed HgbA1c lab order to [facility] to be collect at next home draw. Updated task list with pt new PCP.   - Coordination of care - Updated team on pt progress.   - Updated chart with pt new home address.   - Provided pt with list of goals and resources below via AVS/Exacterhart and mail.    Patient demonstrates understanding.    Expected Engagement: good    Nutrition Goals  1) Follow 1400 calorie/day plan  2) Aim for less than 60 gm carb per meal and less than 15 gm carb per snack  3) Eat very slowly, chewing thoroughly. Take 20-30 mins to eat.   4) Work to maintain consistency with exercise sessions. Keep up with walking routine with PT.   5) Avoid dining-out/take-out     Meal Plan (1400 ronna/day):  Breakfast: Jose Eisenberg (300 ronna)  Lunch: Protien shake and 3/4 cup plain Oatmeal with 1/2 c fruit (360 ronna)  Snack: Protein bar (200)   Dinner: 3 oz baked chicken/fish/turkey + 1.5 cup veggies + 1/2 cup whole grain or starchy vegetable; Healthy Choice Power Bowl (300 ronna)  Snack: Protein Bar (200 ronna)     Protein Bar Options:  Quest Protein Bars (190 Calories, 20 g protein)  Built Bar (170 Calories, 15-20 g protein)  One Protein Bar (210 calories, 20 g protein)  Juares Signature Protein Bar ([employer]) (190 Calories, 21 g protein)  Pure Protein Bars (180 Calories, 21 g protein)    Low Calorie Frozen Meal:  Healthy Choice Power Bowls  Lean Cuisine  Smart Ones  Jose Felixjose Eisenberg    Goals after surgery:   1. Follow the bariatric post-op  "diet advancement schedule (see below)  2. Sip on 48-64 oz (or greater) fluids daily, recording intake to help stay on-track.  - Drink at least 1-2 oz of fluid every 15-30 min.  3. Stop vitamins/minerals 1 week before surgery. We will discuss starting a chewable multivitamin at the one week post-op visit.   4. Work towards consuming 60 gm protein daily.     Post-op Diet Advancement Schedule:  Clear Liquid Diet (stage 1): start 6/12  Low-Fat Full Liquid Diet (stage 2): start 6/14  Pureed Diet (stage 3): start 6/27  Soft Diet (stage 4): start 7/11  Regular Diet (stage 5): start 8/8     Post-op Diet Handouts:  Diet Guidelines after Weight-loss Surgery  [email or web address]     Your Stage 1 Diet: Clear Liquids  [email or web address]     Your Stage 2 Diet: Low-fat Full Liquids  [email or web address]     Your Stage 3 Diet: Pureed Foods  [email or web address]     Pureed Recipes  [email or web address]    Your Stage 4 Diet: Soft Foods  [email or web address]    Your Stage 5 Diet: Regular Foods  [email or web address]    Supplements after Sleeve Gastrectomy, Gastric Bypass or Single Anastomosis Duodenal Switch  [email or web address]    Keeping Track of Fluids  [email or web address]      Tools for after surgery:  [employer] Dish Sets: bariatric meal size bowls and plates with built in measurement designs on the dishes    Bariatric Pal: https://store.bariatricpal.com/collections/bariatric-dinnerware    Books:  \"Fresh Start Bariatric Cookbook\" by Matilde Armenta, MS, RDN, CD - Excellent cookbook with post-op recipes and many other resources to check out for ongoing support after surgery    \"Eating Well After Weight Loss Surgery\" by Leatha Mendoza and Jorge Alberto Mcgee-Yolanda - Great cookbook with recipes for each diet stage after surgery and recommended portion sizes. Slightly more intensive cooking recipes.    \"Bariatric Mindset Success\" by Priscila Valle - book that " helps with prevention of weight regain after surgery. Helps train your brain for long term success with weight loss after surgery.    Apps:  [employer]co[employer] jodi -  fluid and nutritional tracking. Also has bariatric recipes.     Post-Bariatric Surgery Online Recipe Resources:  Online:    Simpler Recipes: https://www.Body Central/bariatric-surgery/recipes    https://www.Advanced Biomedical Technologies.TaxiBeat/bariatric-recipes/    https://Slingbox.TaxiBeat/bariatric-recipes/     Some recipes on this site have larger than 1 cup portion size pictures: http://www.[facility].org/weight-loss-surgery/nutrition/recipe-corner.html     https://www.ReferBright.me/25-bariatric-friendly-weeknight-meals/    Crockpot recipes: https://www.ReferBright.me/25-bariatric-friendly-crockpot-recipes/    https://myTips.TaxiBeat/recipes/     https://www.Neurescue.TaxiBeat/mbd-recipes    The World According to EggPixablece Blog: https://theworldaccordingtoeggface.Exchangery.com/        Follow-Up:  5/22/23    Time spent with patient: 34 minutes.  Blossom Caal RD, LD

## 2023-04-28 ENCOUNTER — VIRTUAL VISIT (OUTPATIENT)
Dept: ENDOCRINOLOGY | Facility: CLINIC | Age: 43
End: 2023-04-28
Payer: MEDICARE

## 2023-04-28 DIAGNOSIS — E11.9 TYPE 2 DIABETES MELLITUS WITHOUT COMPLICATION, WITHOUT LONG-TERM CURRENT USE OF INSULIN (H): ICD-10-CM

## 2023-04-28 DIAGNOSIS — E11.9 DIABETES MELLITUS, TYPE 2 (H): ICD-10-CM

## 2023-04-28 DIAGNOSIS — E66.01 MORBID OBESITY (H): ICD-10-CM

## 2023-04-28 DIAGNOSIS — Z71.3 NUTRITIONAL COUNSELING: Primary | ICD-10-CM

## 2023-04-28 PROCEDURE — 97803 MED NUTRITION INDIV SUBSEQ: CPT | Mod: GA | Performed by: DIETITIAN, REGISTERED

## 2023-04-28 PROCEDURE — 99207 PR NO CHARGE LOS: CPT | Mod: GA | Performed by: DIETITIAN, REGISTERED

## 2023-04-28 NOTE — PATIENT INSTRUCTIONS
Joe Santana,    Follow-up with RD on 5/22    Thank you,    Blossom Caal, RD, LD  If you would like to schedule or reschedule an appointment with the RD, please call [phone]    Nutrition Goals  1) Follow 1400 calorie/day plan  2) Aim for less than 60 gm carb per meal and less than 15 gm carb per snack  3) Eat very slowly, chewing thoroughly. Take 20-30 mins to eat.   4) Work to maintain consistency with exercise sessions. Keep up with walking routine with PT.   5) Avoid dining-out/take-out     Meal Plan (1400 ronna/day):  Breakfast: Jose Eisenberg (300 ronna)  Lunch: Protien shake and 3/4 cup plain Oatmeal with 1/2 c fruit (360 ronna)  Snack: Protein bar (200)   Dinner: 3 oz baked chicken/fish/turkey + 1.5 cup veggies + 1/2 cup whole grain or starchy vegetable; Healthy Choice Power Bowl (300 ronna)  Snack: Protein Bar (200 ronna)     Protein Bar Options:  Quest Protein Bars (190 Calories, 20 g protein)  Built Bar (170 Calories, 15-20 g protein)  One Protein Bar (210 calories, 20 g protein)  Juares Signature Protein Bar ([employer]co) (190 Calories, 21 g protein)  Pure Protein Bars (180 Calories, 21 g protein)    Low Calorie Frozen Meal:  Healthy Choice Power Bowls  Lean Cuisine  Smart Ones  Jose Ray Eisenberg    Goals after surgery:   1. Follow the bariatric post-op diet advancement schedule (see below)  2. Sip on 48-64 oz (or greater) fluids daily, recording intake to help stay on-track.  - Drink at least 1-2 oz of fluid every 15-30 min.  3. Stop vitamins/minerals 1 week before surgery. We will discuss starting a chewable multivitamin at the one week post-op visit.   4. Work towards consuming 60 gm protein daily.     Post-op Diet Advancement Schedule:  Clear Liquid Diet (stage 1): start 6/12  Low-Fat Full Liquid Diet (stage 2): start 6/14  Pureed Diet (stage 3): start 6/27  Soft Diet (stage 4): start 7/11  Regular Diet (stage 5): start 8/8     Post-op Diet Handouts:  Diet Guidelines after Weight-loss  "Surgery  [email or web address]     Your Stage 1 Diet: Clear Liquids  [email or web address]     Your Stage 2 Diet: Low-fat Full Liquids  [email or web address]     Your Stage 3 Diet: Pureed Foods  [email or web address]     Pureed Recipes  [email or web address]    Your Stage 4 Diet: Soft Foods  [email or web address]    Your Stage 5 Diet: Regular Foods  [email or web address]    Supplements after Sleeve Gastrectomy, Gastric Bypass or Single Anastomosis Duodenal Switch  [email or web address]    Keeping Track of Fluids  [email or web address]      Tools for after surgery:  zkipster Dish Sets: bariatric meal size bowls and plates with built in measurement designs on the dishes    Bariatric Pal: https://store.Concentra.Nongxiang Network/collections/bariatric-dinnerware    Books:  \"Fresh Start Bariatric Cookbook\" by Matilde Armenta, MS, RDN, CD - Excellent cookbook with post-op recipes and many other resources to check out for ongoing support after surgery    \"Eating Well After Weight Loss Surgery\" by Leatha Mendoza and Jorge Alberto Mcgee-Yolanda - Great cookbook with recipes for each diet stage after surgery and recommended portion sizes. Slightly more intensive cooking recipes.    \"Bariatric Mindset Success\" by Priscila Valle - book that helps with prevention of weight regain after surgery. Helps train your brain for long term success with weight loss after surgery.    Apps:  Memoir jodi -  fluid and nutritional tracking. Also has bariatric recipes.     Post-Bariatric Surgery Online Recipe Resources:  Online:  Simpler Recipes: https://www.Youlicit/bariatric-surgery/recipes  https://www.Seesearch.Nongxiang Network/bariatric-recipes/  https://CubeTree.Nongxiang Network/bariatric-recipes/   Some recipes on this site have larger than 1 cup portion size pictures: http://www.[facility].org/weight-loss-surgery/nutrition/recipe-corner.html "   https://www.Topspin Media.me/25-bariatric-friendly-weeknight-meals/  Crockpot recipes: https://www.Topspin Media.me/25-bariatric-friendly-crockpot-recipes/  https://FKK Corporation/recipes/   https://www.SocialDiabetes.Eved/mbd-recipes  The World According to Dynamo [employer]astics Blog: https://theworldSustainable Real Estate Solutionsrdingtojessicagface.BeInSync.Eved/            COMPREHENSIVE WEIGHT MANAGEMENT PROGRAM  VIRTUAL SUPPORT GROUPS    For Support Group Information:      We offer support groups for patients who are working on weight loss and considering, preparing for or have had weight loss surgery.   There is no cost for this opportunity.  You are invited to attend the?Virtual Support Groups?provided by any of the following locations:    [facility] via Microsoft Teams with Sarah Merritt RN  2.   [place] via Hooked with Obed Davenport, PhD, LP  3.   [place] via Hooked with Gabi Winchester RN  4.   [facility] via Microsoft Teams with Gabi Walters [facility]    The following Support Group information can also be found on our website:  https://www.LeadCloudthfairview.org/treatments/weight-loss-surgery-support-groups    https://www.ealthfairview.org/treatments/weight-loss-and-weight-loss-surgery-support-groups    [facility] Weight Loss Surgery Support Group    [facility] Weight Loss Surgery Support Group  The support group is a patient-lead forum that meets monthly to share experiences, encouragement and education. It is open to those who have had weight loss surgery, are scheduled for surgery, or are considering surgery.   WHEN: This group meets on the 3rd Wednesday of each month from 5:00PM - 6:00PM virtually using Microsoft Teams.   FACILITATOR: Led by aSrah Ceja, JOSEPHINE, LD, RN, the program's Clinical Coordinator.   TO REGISTER: Please contact the clinic via Vitasol or call the nurse line directly at [phone] to inform our staff that you would like an invite sent to you and to let us know  "the email you would like the invite sent to. Prior to the meeting, a link with directions on how to join the meeting will be sent to you.    2023 Meetings (speakers to be determined)  January 18: \"Let's Talk\" a time for the group to share.  February 15: \"Let's Talk\" a time for the group to share.  March 15: \"Let's Talk\" a time for the group to share.  April 19: \"Let's Talk\" a time for the group to share.  May 17: \"Let's Talk\" a time for the group to share.  June 21: \"Let's Talk\" a time for the group to share.    [facility] [place] [facility] [place] Support Groups    [facility]ctions Bariatric Care Support Group?  This is open to all pre- and post- operative bariatric surgery patients as well as their support system.   WHEN: This group meets the 2nd Tuesday of each month from 6:30 PM - 8:00 PM virtually using Microsoft Teams.   FACILITATOR: Led by Obed Davenport, Ph.D who is a Licensed Psychologist with the [facility] Comprehensive Weight Management Program.   TO REGISTER: Please send an email to Obed Davenport, Ph.D., LP at?[email or web address]?if you would like an invitation to the group and to learn about using Microsoft Teams.    2023 Meetings  January 10: Jignesh Irving, PharmD, Pharmacy Resident, \"Medications and Bariatric Surgery\"  February 14:  March 14:  April 11:  May 9:  June 11:    Connections Post-Operative Bariatric Surgery Support Group  This is a support group for [facility] bariatric patients (and those external to [facility]) who have had bariatric surgery and are at least 3 months post-surgery.  WHEN: This support group meets the 4th Wednesday of the month from 11:00 AM - 12:00 PM virtually using Microsoft Teams.   FACILITATOR: Led by Certified Bariatric Nurse, Gabi Winchester RN.   TO REGISTER: Please send an email to Gabi at [email or web address] if you would like an invitation to the group and to learn about using Microsoft Teams.    2023 " "Meetings  January 25  February 22  March 22  April 26  May 24  Geraldine 28      [facility] Healthy Lifestyle Virtual Support Group    Healthy Lifestyle Virtual Support Group?  This is 60 minutes of small group guided discussion, support and resources. All are welcome who want a healthy lifestyle.  WHEN: This group meets monthly on a Friday from 12:30 PM - 1:30 PM virtually using Microsoft Teams.   FACILITATOR: Led by National Board Certified Health and , Gabi Walters [facility].   TO REGISTER: Please send an email to Gabi [email or web address] to receive monthly invites to the group or if you have any questions about having a health .  Prior to the meeting, a link with directions on how to join the meeting will be sent to you.    2023 Meetings  January 20: \"Let's Talk\" a time for the group to share  February 17: Guest Speaker, Ale Hu RD, Registered Dietician, \"Tips to Maximize Your Metabolism\"  March 17: Let's Talk\" a time for the group to share  April 14: Guest Speaker, Yola De La Fuente RD, Registered Dietician, \"Heart Health\"  May 19: \"Let's Talk\" a time for the group to share  June: To be announced.            "

## 2023-04-28 NOTE — LETTER
"4/28/2023       RE: Max Meade  [address]  [facility] [id number]     Dear Colleague,    Thank you for referring your patient, Max Meade, to the [facility] [place] at [facility]. Please see a copy of my visit note below.    Max Meade is a 42 year old male who is being evaluated via a billable video visit.      The patient has been notified of following:     \"This video visit will be conducted via a call between you and your physician/provider. We have found that certain health care needs can be provided without the need for an in-person physical exam.  This service lets us provide the care you need with a video conversation.  If a prescription is necessary we can send it directly to your pharmacy.  If lab work is needed we can place an order for that and you can then stop by our lab to have the test done at a later time.    Video visits are billed at different rates depending on your insurance coverage.  Please reach out to your insurance provider with any questions.    If during the course of the call the physician/provider feels a video visit is not appropriate, you will not be charged for this service.\"    Patient has given verbal consent for Video visit? Yes  How would you like to obtain your AVS? MyChart  Will anyone else be joining your video visit? No  {If patient encounters technical issues they should call [phone]      Video-Visit Details    Type of service:  Video Visit    Video Start Time: 10:30 AM   Video End Time: 11:04 AM    Originating Location (pt. Location): Home    Distant Location (provider location):  [facility] [place]     Platform used for Video Visit: Yagantec    During this virtual visit the patient is located in [place], patient verifies this as the location during the entirety of this visit.     Pt signed Medicare ABN form which is good for 1 year " "(8/29/22 - 8/29/23). Scanned into chart 8/30/22.      Weight Management Nutrition Consultation    Max Meade is a 42 year old male presents today for return bariatric consultation and nutrition education regarding clear and low-fat full liquid diet for post-bariatric surgery. Pt is interested in sleeve gastrectomy with Dr. Campos expected surgery date on 6/13/23.  Patient referred by Dr. Hdz and Sherice Schmid PA-C.     Patient with Co-morbidities of obesity including:  Type II DM yes   Renal Failure no  Sleep apnea yes  Hypertension no   Dyslipidemia no  Joint pain yes  Back pain yes  GERD no     Anthropometrics:  Now that pt is home, he does not have a way to check/monitor his weight.     Goal weight for weight loss surgery: 557 lbs or less day of surgery     Current weight: Unknown    Last filed vital signs while admitted:   527 lb 04/11/2023 9:37 AM CDT       Initial MWM consult weight/BMI:  Estimated body mass index is 81.19 kg/m  as calculated from the following:    Height as of an earlier encounter on 12/27/21: 1.93 m (6' 4\").    Weight as of an earlier encounter on 12/27/21: 302.5 kg (667 lb).    Per notes from [facility] RD:  12/13/21 (!) 293.8 kg (647 lb 12.8 oz)   12/08/21 (!) 287.9 kg (634 lb 12.8 oz)   09/27/21 (!) 292.2 kg (644 lb 3.2 oz)     Admitted in March 2021. Notes he started working on weight loss at this time. Weight per care everywhere:   321 kg (707 lb 10.8 oz) 03/18/2021        Medications for Weight Loss:  Ozempic  Topiramate    NUTRITION HISTORY  See previous RD notes for full nutrition history.  Does not tolerate regular milk.     Pt discharged home after being admitted for 2 years. He does not know what his current weight is, but thinks he has gained weight as he described \"getting off track\" with diet since being home.  He had been doing Healthy Choice Power Bowls for dinner, then got tired of these and started ordering take out, but now intends to get back " "to using the low-calorie frozen meals. He reports finding these satisfying and would like to continue using. He appears dedicated to getting back to low-calorie meal plan and set goal for himself to avoid take-out.    Pt due for recheck of Hgb A1c. States he has labs drawn at home next on 5/1. Called [facility]- (phone:[phone]), and spoke with staff member, Edmundo - obtained Fax number: [phone]. Pt is also now seeing new PCP Malou Ahn NP with [facility]. Requested letter of support from new PCP be faxed to us.       Recent Diet Recall:  Breakfast: Jose Bell bfast sandwich  Lunch: oatmeal plus protein shake   Dinner: Health Choice Power Bowl (high-protein); baked chicken; sushi   Snacks: Premier Protein Shake 2-3 times per day between meals   Beverages:  Dining Out: More since being home, something     Progress Towards Previous Goals:  1) Follow 1400 calorie/day plan - Not met since being home   - Continue to include low-calorie protein shakes with 2 meals and snacks as needed daily.  2) Aim for less than 60 gm carb per meal and less than 15 gm carb per snack - Not met since being home  3) Eat very slowly, chewing thoroughly. Take 20-30 mins to eat. - Did not discuss today  4) Work to maintain consistency with exercise sessions. Keep up with walking routine with PT. - Met, meeting with PT twice per week  5) Once home:   - stock up on variety of lean protein, fruits and vegetables - Met, pt states he does have ingredients/meal replacements needs to maintain hypocaloric diet plan.   - Avoid packaged/processed snacks and calorie-containing beverages - Not met, reports snacking on cookies at night.    - Use spray oil for cooking   - Stock up on Premier Protein shakes - Met, has a supply at home.     Physical Activity:  PT/OT and exercise 4 times per week for 30 mins and doing seated and now standing exercises.    Nutrition Prescription  Recommended energy/nutrient modification.  1500 calories/day " or less (per MD)    Nutrition Diagnosis  Obesity r/t long history of positive energy balance aeb BMI >30. - improving/continues    Nutrition Intervention  Materials/education provided:  - Reviewed progress towards previous goals  - Reviewed dietary strategy for weight loss and management of type 2 DM - continuing hypocaloric and low/moderate carb meal plan as established.   - Discussed need to be mobile for surgery. Encouraged consistency with exercise sessions and PT. Praised pt on the progress he has made with thus far.    - Provided written and verbal education on post-op diet and transition (Clear Liquid, Low-Fat Full Liquid, Pureed, Soft and Regular consistencies). Mailed handouts listed below to pt on 4/28/23.   - Answered pt nutrition related questions.    - Reviewed surgery task list - Faxed HgbA1c lab order to [facility] to be collect at next home draw. Updated task list with pt new PCP.   - Coordination of care - Updated team on pt progress.   - Updated chart with pt new home address.   - Provided pt with list of goals and resources below via AVS/ESP Systemshart and mail.    Patient demonstrates understanding.    Expected Engagement: good    Nutrition Goals  1) Follow 1400 calorie/day plan  2) Aim for less than 60 gm carb per meal and less than 15 gm carb per snack  3) Eat very slowly, chewing thoroughly. Take 20-30 mins to eat.   4) Work to maintain consistency with exercise sessions. Keep up with walking routine with PT.   5) Avoid dining-out/take-out     Meal Plan (1400 ronna/day):  Breakfast: Jose Rayjose Eisenberg (300 ronna)  Lunch: Protien shake and 3/4 cup plain Oatmeal with 1/2 c fruit (360 ronna)  Snack: Protein bar (200)   Dinner: 3 oz baked chicken/fish/turkey + 1.5 cup veggies + 1/2 cup whole grain or starchy vegetable; Healthy Choice Power Bowl (300 ronna)  Snack: Protein Bar (200 ronna)     Protein Bar Options:  Quest Protein Bars (190 Calories, 20 g protein)  Built Bar (170 Calories, 15-20 g protein)  One  "Protein Bar (210 calories, 20 g protein)  [place] Signature Protein Bar ([employer]) (190 Calories, 21 g protein)  Pure Protein Bars (180 Calories, 21 g protein)    Low Calorie Frozen Meal:  Healthy Choice Power Bowls  Lean Mary Eisenberg    Goals after surgery:   1. Follow the bariatric post-op diet advancement schedule (see below)  2. Sip on 48-64 oz (or greater) fluids daily, recording intake to help stay on-track.  - Drink at least 1-2 oz of fluid every 15-30 min.  3. Stop vitamins/minerals 1 week before surgery. We will discuss starting a chewable multivitamin at the one week post-op visit.   4. Work towards consuming 60 gm protein daily.     Post-op Diet Advancement Schedule:  Clear Liquid Diet (stage 1): start 6/12  Low-Fat Full Liquid Diet (stage 2): start 6/14  Pureed Diet (stage 3): start 6/27  Soft Diet (stage 4): start 7/11  Regular Diet (stage 5): start 8/8     Post-op Diet Handouts:  Diet Guidelines after Weight-loss Surgery  [email or web address]     Your Stage 1 Diet: Clear Liquids  [email or web address]     Your Stage 2 Diet: Low-fat Full Liquids  [email or web address]     Your Stage 3 Diet: Pureed Foods  [email or web address]     Pureed Recipes  [email or web address]    Your Stage 4 Diet: Soft Foods  [email or web address]    Your Stage 5 Diet: Regular Foods  [email or web address]    Supplements after Sleeve Gastrectomy, Gastric Bypass or Single Anastomosis Duodenal Switch  [email or web address]    Keeping Track of Fluids  [email or web address]      Tools for after surgery:  Saplo Dish Sets: bariatric meal size bowls and plates with built in measurement designs on the dishes    Bariatric Pal: https://store.PolyMedix.Xcelaero/collections/bariatric-dinnerware    Books:  \"Fresh Start Bariatric Cookbook\" by Matilde Armenta, MS, RDN, CD - Excellent cookbook with post-op recipes and many other resources to check " "out for ongoing support after surgery    \"Eating Well After Weight Loss Surgery\" by Leatha Mendoza and Jorge Alberto Chowdhury - Great cookbook with recipes for each diet stage after surgery and recommended portion sizes. Slightly more intensive cooking recipes.    \"Bariatric Mindset Success\" by Priscila Valle - book that helps with prevention of weight regain after surgery. Helps train your brain for long term success with weight loss after surgery.    Apps:  Haute App jodi -  fluid and nutritional tracking. Also has bariatric recipes.     Post-Bariatric Surgery Online Recipe Resources:  Online:  Simpler Recipes: https://www.addwish/bariatric-surgery/recipes  https://www.VoicePrism Innovations.Magick.nu/bariatric-recipes/  https://MultiZona.com/bariatric-recipes/   Some recipes on this site have larger than 1 cup portion size pictures: http://www.[facility].org/weight-loss-surgery/nutrition/recipe-corner.html   https://www.Climateminder.me/25-bariatric-friendly-weeknight-meals/  Crockpot recipes: https://www.Climateminder.me/25-bariatric-friendly-crockpot-recipes/  https://[employer] WebEdu Technology.Magick.nu/recipes/   https://www.Slicebooks.Magick.nu/mbd-recipes  The World According to @Pay Blog: https://theworldaccordingtoeggface.Splendia.com/        Follow-Up:  5/22/23    Time spent with patient: 34 minutes.  Blossom Caal RD, LD  "

## 2023-05-03 NOTE — PROGRESS NOTES
Return Medical Weight Management Note     Max Meade  MRN:  [id number]  :  1980  JOSEFINA:  2023    Dear Physician No Ref-Primary,    I had the pleasure of seeing your patient Max Meade. He is a 42 year old male who I am continuing to see for treatment of obesity related to:        2022     2:51 PM   --   I have the following health issues associated with obesity Type II Diabetes    Sleep Apnea       Assessment & Plan   Problem List Items Addressed This Visit        Endocrine Diagnoses    Morbid obesity (H) - Primary    Relevant Medications    Semaglutide, 2 MG/DOSE, (OZEMPIC) 8 MG/3ML pen      Plan for sleeve gastrectomy with Dr Campos.  Has lost from 667 to 530lbs  Since returning home from 1.5 yr hospital stay he thinks he has been gaining some weight but no scale available.  I am also concerned that he has been much less mobile since returning home, only walking a few steps with walker to the bathroom and transferring from bed to chair and back  I discussed care with current home MD provider and discussed concerns. She is going to set him up with a cardiology evaluation for clearance for surgery and also will get accurate in clinic weight to see if he has been maintaining his weight loss. Concerns about compliance with medications since returning home also.  He will need to increase his mobility significantly before surgery to reduce his morbidity/mortality risk postop  I sent patient a detailed list of goals before surgery  Need to continue with virtual visits with therapist, has not continued this since coming home.  Need updated A1C to be <8 preop, order placed.    Need MTM visit phone/video first available.   RD monthly    Will discuss with team that  surgery date unlikely and we should push the surgery back to give time for increased mobility.      INTERVAL HISTORY:  Planning on sleeve gastrectomy with Dr Campos 23    Back home after extended hospitalization for 1.5  years.  Has PCA, home nurse and home PT  Mobility: more mobile now using walker to walk from bed to bathroom and bathroom to bed  Weight loss: Lost from 667 to 525 lbs.     A1C check on Monday  Off oxycodone      CURRENT WEIGHT:   530 lbs 0 oz    Initial Weight (lbs): 667 lbs  Last Visits Weight: 238.6 kg (526 lb)  Cumulative weight loss (lbs): 137  Weight Loss Percentage: 20.54%        5/4/2023     1:39 PM   Changes and Difficulties   I have made the following changes to my diet since my last visit: No not really.   With regards to my diet, I am still struggling with: Keeping portions down.   I have made the following changes to my activity/exercise since my last visit: It has gotten lesser.   With regards to my activity/exercise, I am still struggling with: Being more active.         MEDICATIONS:   Current Outpatient Medications   Medication Sig Dispense Refill     acetaminophen (TYLENOL) 325 MG tablet Take 2 tablets by mouth every 4 hours as needed       albuterol (PROVENTIL) (2.5 MG/3ML) 0.083% neb solution        apixaban ANTICOAGULANT (ELIQUIS) 2.5 MG tablet Take 2.5 mg by mouth       atorvastatin (LIPITOR) 10 MG tablet Take 10 mg by mouth       atorvastatin (LIPITOR) 10 MG tablet Take 10 mg by mouth       bumetanide (BUMEX) 1 MG tablet Take 1 mg by mouth       busPIRone (BUSPAR) 10 MG tablet Take 10 mg by mouth       cholecalciferol 25 MCG (1000 UT) TABS Take 25 mcg by mouth       cholecalciferol 50 MCG (2000 UT) tablet Take 50 mcg by mouth       cyclobenzaprine (FLEXERIL) 5 MG tablet        DULoxetine (CYMBALTA) 60 MG capsule Take 120 mg by mouth       famotidine (PEPCID) 20 MG tablet Take 20 mg by mouth       ferrous sulfate dried 160 (50 Fe) MG tablet Take 160 mg by mouth       gabapentin (NEURONTIN) 600 MG tablet Take 1 tablet (600 mg) by mouth 2 times a day in the morning and evening       hydrOXYzine (VISTARIL) 25 MG capsule Take 25 mg by mouth       insulin aspart (NOVOLOG VIAL) 100 UNITS/ML vial  "Inject 24 Units Subcutaneous       insulin glargine (LANTUS VIAL) 100 UNIT/ML vial Inject 47 Units Subcutaneous       insulin lispro (HUMALOG VIAL) 100 UNIT/ML vial Inject 2-10 Units Subcutaneous       lactulose (CHRONULAC) 10 GM/15ML solution Take 60 mLs by mouth       Magnesium Oxide 500 MG TABS Take 500 mg by mouth       melatonin 3 MG tablet Take 3 mg by mouth       metoprolol tartrate (LOPRESSOR) 25 MG tablet Take 25 mg by mouth       mineral oil-hydrophilic petrolatum (AQUAPHOR) external ointment        Multiple Vitamins-Minerals (MULTI VITAMIN  /MINERALS) TABS Take 1 tablet by mouth       omeprazole (PRILOSEC) 20 MG DR capsule Take 20 mg by mouth       ondansetron (ZOFRAN ODT) 4 MG ODT tab Take 1 tablet by mouth every 6 hours as needed       pantoprazole (PROTONIX) 40 MG EC tablet Take 40 mg by mouth       Semaglutide, 2 MG/DOSE, (OZEMPIC) 8 MG/3ML pen Inject 2 mg Subcutaneous       senna-docusate (SENOKOT-S/PERICOLACE) 8.6-50 MG tablet Take 2 tablets by mouth       topiramate (TOPAMAX) 25 MG tablet Take 75 mg by mouth       traMADol (ULTRAM) 50 MG tablet        traZODone (DESYREL) 50 MG tablet Take 50 mg by mouth       zolpidem (AMBIEN) 5 MG tablet Take 5 mg by mouth       naloxone (NARCAN) 4 MG/0.1ML nasal spray Spray 1 spray (4 mg) into one nostril 1 time; may repeat in opposite nostril in 2 minutes if person does not respond.             5/4/2023     1:39 PM   Weight Loss Medication History Reviewed With Patient   Which weight loss medications are you currently taking on a regular basis? Ozempic    Topamax (topiramate)   If you are having side effects please describe: Nausea.       No results found for any previous visit.       PHYSICAL EXAM:  Objective    Ht 1.93 m (6' 4\")   Wt (!) 240.4 kg (530 lb)   BMI 64.51 kg/m             Vitals:  No vitals were obtained today due to virtual visit.    General: lying in bed. No apparent distress        Sincerely,    Sherice Schmid PA-C      30 minutes " spent by me on the date of the encounter doing chart review, history and exam, documentation and further activities per the note    Virtual Visit Check-In    During this virtual visit the patient is located in [place], patient verifies this as the location during the entirety of this visit.     Max is a 42 year old who is being evaluated via a billable video visit.      How would you like to obtain your AVS? MyChart  If the video visit is dropped, the invitation should be resent by: Text to cell phone: [phone]  Will anyone else be joining your video visit? No      Video-Visit Details    Originating Location (pt. Location): Home  Distant Location (provider location):  Off-site  Platform used for Video Visit: Eddy Robertson, EMT

## 2023-05-04 ENCOUNTER — VIRTUAL VISIT (OUTPATIENT)
Dept: ENDOCRINOLOGY | Facility: CLINIC | Age: 43
End: 2023-05-04
Payer: MEDICARE

## 2023-05-04 VITALS — HEIGHT: 76 IN | BODY MASS INDEX: 38.36 KG/M2 | WEIGHT: 315 LBS

## 2023-05-04 DIAGNOSIS — E66.01 MORBID OBESITY (H): Primary | ICD-10-CM

## 2023-05-04 PROCEDURE — 99214 OFFICE O/P EST MOD 30 MIN: CPT | Mod: VID | Performed by: PHYSICIAN ASSISTANT

## 2023-05-04 RX ORDER — TRAMADOL HYDROCHLORIDE 50 MG/1
25 TABLET ORAL
COMMUNITY
Start: 2023-04-13

## 2023-05-04 RX ORDER — CYCLOBENZAPRINE HCL 5 MG
5 TABLET ORAL 3 TIMES DAILY PRN
COMMUNITY
Start: 2023-04-13

## 2023-05-04 RX ORDER — BUSPIRONE HYDROCHLORIDE 10 MG/1
10 TABLET ORAL 2 TIMES DAILY
COMMUNITY
Start: 2023-04-13 | End: 2024-04-17

## 2023-05-04 RX ORDER — METOPROLOL TARTRATE 25 MG/1
25 TABLET, FILM COATED ORAL 2 TIMES DAILY
COMMUNITY
Start: 2023-04-13 | End: 2024-04-17

## 2023-05-04 RX ORDER — TRAZODONE HYDROCHLORIDE 50 MG/1
50 TABLET, FILM COATED ORAL AT BEDTIME
COMMUNITY
Start: 2023-04-13 | End: 2024-04-17

## 2023-05-04 RX ORDER — ONDANSETRON 4 MG/1
1 TABLET, ORALLY DISINTEGRATING ORAL EVERY 6 HOURS PRN
COMMUNITY
Start: 2023-04-13 | End: 2023-05-13

## 2023-05-04 RX ORDER — ATORVASTATIN CALCIUM 10 MG/1
10 TABLET, FILM COATED ORAL
COMMUNITY
Start: 2023-04-13 | End: 2023-05-15

## 2023-05-04 RX ORDER — ACETAMINOPHEN 325 MG/1
2 TABLET ORAL EVERY 4 HOURS PRN
COMMUNITY
Start: 2023-04-13

## 2023-05-04 RX ORDER — GABAPENTIN 300 MG/1
300 CAPSULE ORAL DAILY
COMMUNITY
Start: 2023-04-28

## 2023-05-04 RX ORDER — TOPIRAMATE 25 MG/1
75 TABLET, FILM COATED ORAL DAILY
COMMUNITY
Start: 2023-04-13 | End: 2024-04-17

## 2023-05-04 RX ORDER — FAMOTIDINE 20 MG/1
20 TABLET, FILM COATED ORAL
COMMUNITY
Start: 2023-04-13 | End: 2023-05-13

## 2023-05-04 RX ORDER — DULOXETIN HYDROCHLORIDE 60 MG/1
120 CAPSULE, DELAYED RELEASE ORAL DAILY
COMMUNITY
Start: 2023-04-13 | End: 2024-04-17

## 2023-05-04 NOTE — NURSING NOTE
"Chief Complaint   Patient presents with     Follow Up     Return weight management.         Vitals:    05/04/23 1133   Weight: (!) 530 lb   Height: 6' 4\"       Body mass index is 64.51 kg/m .      Johnny Orosco, EMT  [facility] Clinic                      "

## 2023-05-04 NOTE — LETTER
2023       RE: Max Meade  [address]  [facility] [address]     Dear Colleague,    Thank you for referring your patient, Max Meade, to the [facility]. Please see a copy of my visit note below.      Return Medical Weight Management Note     Max Meade  MRN:  [id number]  :  1980  JOSEFINA:  2023    Dear Physician No Ref-Primary,    I had the pleasure of seeing your patient Max Meade. He is a 42 year old male who I am continuing to see for treatment of obesity related to:        2022     2:51 PM   --   I have the following health issues associated with obesity Type II Diabetes    Sleep Apnea       Assessment & Plan   Problem List Items Addressed This Visit          Endocrine Diagnoses    Morbid obesity (H) - Primary    Relevant Medications    Semaglutide, 2 MG/DOSE, (OZEMPIC) 8 MG/3ML pen      Plan for sleeve gastrectomy with Dr Campos.  Has lost from 667 to 530lbs  Since returning home from 1.5 yr hospital stay he thinks he has been gaining some weight but no scale available.  I am also concerned that he has been much less mobile since returning home, only walking a few steps with walker to the bathroom and transferring from bed to chair and back  I discussed care with current home MD provider and discussed concerns. She is going to set him up with a cardiology evaluation for clearance for surgery and also will get accurate in clinic weight to see if he has been maintaining his weight loss. Concerns about compliance with medications since returning home also.  He will need to increase his mobility significantly before surgery to reduce his morbidity/mortality risk postop  I sent patient a detailed list of goals before surgery  Need to continue with virtual visits with therapist, has not continued this since coming home.  Need updated A1C to be <8 preop, order  placed.    Need MTM visit phone/video first available.   RD monthly    Will discuss with team that 6/13 surgery date unlikely and we should push the surgery back to give time for increased mobility.      INTERVAL HISTORY:  Planning on sleeve gastrectomy with Dr Campos 6/13/23    Back home after extended hospitalization for 1.5 years.  Has PCA, home nurse and home PT  Mobility: more mobile now using walker to walk from bed to bathroom and bathroom to bed  Weight loss: Lost from 667 to 525 lbs.     A1C check on Monday  Off oxycodone      CURRENT WEIGHT:   530 lbs 0 oz    Initial Weight (lbs): 667 lbs  Last Visits Weight: 238.6 kg (526 lb)  Cumulative weight loss (lbs): 137  Weight Loss Percentage: 20.54%        5/4/2023     1:39 PM   Changes and Difficulties   I have made the following changes to my diet since my last visit: No not really.   With regards to my diet, I am still struggling with: Keeping portions down.   I have made the following changes to my activity/exercise since my last visit: It has gotten lesser.   With regards to my activity/exercise, I am still struggling with: Being more active.         MEDICATIONS:   Current Outpatient Medications   Medication Sig Dispense Refill    acetaminophen (TYLENOL) 325 MG tablet Take 2 tablets by mouth every 4 hours as needed      albuterol (PROVENTIL) (2.5 MG/3ML) 0.083% neb solution       apixaban ANTICOAGULANT (ELIQUIS) 2.5 MG tablet Take 2.5 mg by mouth      atorvastatin (LIPITOR) 10 MG tablet Take 10 mg by mouth      atorvastatin (LIPITOR) 10 MG tablet Take 10 mg by mouth      bumetanide (BUMEX) 1 MG tablet Take 1 mg by mouth      busPIRone (BUSPAR) 10 MG tablet Take 10 mg by mouth      cholecalciferol 25 MCG (1000 UT) TABS Take 25 mcg by mouth      cholecalciferol 50 MCG (2000 UT) tablet Take 50 mcg by mouth      cyclobenzaprine (FLEXERIL) 5 MG tablet       DULoxetine (CYMBALTA) 60 MG capsule Take 120 mg by mouth      famotidine (PEPCID) 20 MG tablet Take 20 mg  by mouth      ferrous sulfate dried 160 (50 Fe) MG tablet Take 160 mg by mouth      gabapentin (NEURONTIN) 600 MG tablet Take 1 tablet (600 mg) by mouth 2 times a day in the morning and evening      hydrOXYzine (VISTARIL) 25 MG capsule Take 25 mg by mouth      insulin aspart (NOVOLOG VIAL) 100 UNITS/ML vial Inject 24 Units Subcutaneous      insulin glargine (LANTUS VIAL) 100 UNIT/ML vial Inject 47 Units Subcutaneous      insulin lispro (HUMALOG VIAL) 100 UNIT/ML vial Inject 2-10 Units Subcutaneous      lactulose (CHRONULAC) 10 GM/15ML solution Take 60 mLs by mouth      Magnesium Oxide 500 MG TABS Take 500 mg by mouth      melatonin 3 MG tablet Take 3 mg by mouth      metoprolol tartrate (LOPRESSOR) 25 MG tablet Take 25 mg by mouth      mineral oil-hydrophilic petrolatum (AQUAPHOR) external ointment       Multiple Vitamins-Minerals (MULTI VITAMIN  /MINERALS) TABS Take 1 tablet by mouth      omeprazole (PRILOSEC) 20 MG DR capsule Take 20 mg by mouth      ondansetron (ZOFRAN ODT) 4 MG ODT tab Take 1 tablet by mouth every 6 hours as needed      pantoprazole (PROTONIX) 40 MG EC tablet Take 40 mg by mouth      Semaglutide, 2 MG/DOSE, (OZEMPIC) 8 MG/3ML pen Inject 2 mg Subcutaneous      senna-docusate (SENOKOT-S/PERICOLACE) 8.6-50 MG tablet Take 2 tablets by mouth      topiramate (TOPAMAX) 25 MG tablet Take 75 mg by mouth      traMADol (ULTRAM) 50 MG tablet       traZODone (DESYREL) 50 MG tablet Take 50 mg by mouth      zolpidem (AMBIEN) 5 MG tablet Take 5 mg by mouth      naloxone (NARCAN) 4 MG/0.1ML nasal spray Spray 1 spray (4 mg) into one nostril 1 time; may repeat in opposite nostril in 2 minutes if person does not respond.             5/4/2023     1:39 PM   Weight Loss Medication History Reviewed With Patient   Which weight loss medications are you currently taking on a regular basis? Ozempic    Topamax (topiramate)   If you are having side effects please describe: Nausea.       No results found for any previous  "visit.       PHYSICAL EXAM:  Objective    Ht 1.93 m (6' 4\")   Wt (!) 240.4 kg (530 lb)   BMI 64.51 kg/m             Vitals:  No vitals were obtained today due to virtual visit.    General: lying in bed. No apparent distress        Sincerely,    Sherice Schmid PA-C      30 minutes spent by me on the date of the encounter doing chart review, history and exam, documentation and further activities per the note    Virtual Visit Check-In    During this virtual visit the patient is located in [place], patient verifies this as the location during the entirety of this visit.     Max is a 42 year old who is being evaluated via a billable video visit.      How would you like to obtain your AVS? MyChart  If the video visit is dropped, the invitation should be resent by: Text to cell phone: [phone]  Will anyone else be joining your video visit? No      Video-Visit Details    Originating Location (pt. Location): Home  Distant Location (provider location):  Off-site  Platform used for Video Visit: Eddy Robertson, EMT      "

## 2023-05-04 NOTE — Clinical Note
Hi Scheduling: can you please call pt to schedule with MTM pharmacist Cuca in the next few weeks if possible? Preop sleeve discuss meds. Thanks Sherice

## 2023-05-15 ENCOUNTER — VIRTUAL VISIT (OUTPATIENT)
Dept: PHARMACY | Facility: CLINIC | Age: 43
End: 2023-05-15
Payer: MEDICAID

## 2023-05-15 DIAGNOSIS — I10 BENIGN ESSENTIAL HYPERTENSION: ICD-10-CM

## 2023-05-15 DIAGNOSIS — F41.0 PANIC DISORDER WITHOUT AGORAPHOBIA: ICD-10-CM

## 2023-05-15 DIAGNOSIS — Z78.9 TAKES DIETARY SUPPLEMENTS: ICD-10-CM

## 2023-05-15 DIAGNOSIS — E78.5 HYPERLIPIDEMIA LDL GOAL <100: ICD-10-CM

## 2023-05-15 DIAGNOSIS — G47.00 INSOMNIA, UNSPECIFIED TYPE: ICD-10-CM

## 2023-05-15 DIAGNOSIS — G89.29 OTHER CHRONIC PAIN: ICD-10-CM

## 2023-05-15 DIAGNOSIS — R06.02 SOB (SHORTNESS OF BREATH): ICD-10-CM

## 2023-05-15 DIAGNOSIS — Z79.4 TYPE 2 DIABETES MELLITUS WITH OTHER SPECIFIED COMPLICATION, WITH LONG-TERM CURRENT USE OF INSULIN (H): ICD-10-CM

## 2023-05-15 DIAGNOSIS — E66.01 CLASS 3 SEVERE OBESITY DUE TO EXCESS CALORIES WITH SERIOUS COMORBIDITY AND BODY MASS INDEX (BMI) OF 60.0 TO 69.9 IN ADULT (H): Primary | ICD-10-CM

## 2023-05-15 DIAGNOSIS — F32.A DEPRESSION, UNSPECIFIED DEPRESSION TYPE: ICD-10-CM

## 2023-05-15 DIAGNOSIS — E11.69 TYPE 2 DIABETES MELLITUS WITH OTHER SPECIFIED COMPLICATION, WITH LONG-TERM CURRENT USE OF INSULIN (H): ICD-10-CM

## 2023-05-15 DIAGNOSIS — D50.9 IRON DEFICIENCY ANEMIA, UNSPECIFIED IRON DEFICIENCY ANEMIA TYPE: ICD-10-CM

## 2023-05-15 DIAGNOSIS — I50.32 CHRONIC DIASTOLIC HEART FAILURE (H): ICD-10-CM

## 2023-05-15 DIAGNOSIS — E66.813 CLASS 3 SEVERE OBESITY DUE TO EXCESS CALORIES WITH SERIOUS COMORBIDITY AND BODY MASS INDEX (BMI) OF 60.0 TO 69.9 IN ADULT (H): Primary | ICD-10-CM

## 2023-05-15 PROCEDURE — 99207 PR NO CHARGE LOS: CPT | Mod: VID | Performed by: PHARMACIST

## 2023-05-15 RX ORDER — INSULIN GLARGINE 100 [IU]/ML
78 INJECTION, SOLUTION SUBCUTANEOUS AT BEDTIME
COMMUNITY
Start: 2023-04-13

## 2023-05-15 RX ORDER — ALBUTEROL SULFATE 90 UG/1
2 AEROSOL, METERED RESPIRATORY (INHALATION) EVERY 6 HOURS
COMMUNITY
Start: 2023-04-13

## 2023-05-15 NOTE — PROGRESS NOTES
"Medication Therapy Management (MTM) Encounter    ASSESSMENT:                            Medication Adherence/Access: No issues identified    Type 2 Diabetes/Obesity:  A1c not at goal < 8% for surgery, but < 7% per ADA guidelines. Would benefit from blood sugar monitoring - changing to AM fasting and bedtime. Will hold off on insulin changes until more information on blood sugars.       Assessed and discussed potential administration changes of medications and potential holding/adjustment of medications post sleeve gastrectomy.  An alternative administration plan was formulated for medications that were greater than approximately ~6-8 mm.   o Medication sizes were identified utilizing the medication's NDC # or drug identifier. Otherwise if medication size was not able to be identified, medications were compared to that of \"size of m&m\" for easy comparison for patient.  - Medications found to be larger than specified below (plan below in plan section):     Gabapentin, duloxetine, multivitamin    Evidence regarding medication plan for medications post-bariatric surgery is largely centralized around Chirag-en-Y gastric bypass per guidelines recommendations. Guideline recommendations are less known for medication adjustments for sleeve gastrectomy specifically.  As the sleeve gastrectomy includes solely removing a portion of stomach and does not impact small intestinal tract (compared to other bariatric surgeries like the Chirag-en-Y Gastric Bypass), medication adjustments may vary from guidelines and follow study data/expert recommendations/center protocol. Therefore, extended release or sustained release medications may be continued post-operatively if the benefit was found to outweigh the risk.     Discussed no NSAIDs post op, can use acetaminophen post-operatively     Per post-bariatric protocol, patient to hold all supplements 1 week before surgery. Supplements that are larger than allowable post op, should be held for " at least 3 months post op. Post-bariatric vitamin regimen start will be discussed at 1 week post op dietitian follow up appointment.   o Multivitamin and Calcium supplementation should be in chewable formulation    Hyperlipidemia: Stable. LDL at goal < 100.     HFrEF/Hypertension: Status unknown. Would benefit from blood pressure check.     Supplements: Stable for now.     Iron Deficiency Anemia: Stable for now.    Depression/Panic Disorder with Agoraphobia: Unimproved, to continue to work with therapist. Encouraged continued consistent follow up with therapy throughout the operation/post op process.     Insomnia: Stable.     Pain: Stable.     SOB: Stable.     PLAN:                            1. Test blood sugars twice daily - AM fasting and bedtime - please send blood sugar readings back to pharmacist via [employer] in 1 week.     2. Surgical Team to Consider:   -Endocrinology referral inpatient to assess plan for blood sugars post operatively     After Surgery:   1. No NSAIDs (Non-Steroidal Anti-Inflammatory Drugs) after Sleeve Gastrectomy - meaning no ibuprofen, naproxen, Aleve, Advil, Motrin post-operatively - these medications can negatively impact the gut lining. Can use acetaminophen post op. Do not exceed 4000 mg/24 hour period.   -Oral dissolve powder Tylenol (acetaminophen) packets are available for use at retail stores or amazon - each packet contains 500 mg acetaminophen. Can use this formulation for the first 3 months post op. Otherwise can cut acetaminophen tablets in half and swallow both halves for dose.      2. Gabapentin, Duloxetine: Open capsule and sprinkle contents over 1 tablespoon of applesauce, yogurt or sugar free pudding and swallow immediately (do not chew).     3. Change from multivitamin to bariatric chewable multivitamin post op.    Follow-up: Return in about 8 weeks (around 7/10/2023) for Medication Therapy Management Pharmacist Visit, (scheduled today).    SUBJECTIVE/OBJECTIVE:            "               Max eMade is a 42 year old male contacted via secure video for an initial visit. He was referred to me from Dr. Campos.      Reason for visit: Discuss plan for medication(s) after surgery.     Allergies/ADRs: Reviewed in chart  Past Medical History: Reviewed in chart  Tobacco: He reports that he has never smoked. He has never used smokeless tobacco.  Alcohol: not currently using  Caffeine: None.     Medication Adherence/Access:   Patient uses pill box(es). Does set up own pill box.   Patient takes medications 3 time(s) per day.   Per patient, misses medication 0 times per week.   Medication barriers: none.   The patient fills medications at [place]: NO, fills medications at [facility].     Type 2 Diabetes/Obesity:    Lantus 78 units evening  Novolog 6 units three times daily with meals   Ozempic 2 mg weekly, restarted a couple weeks ago.     Weight Loss Medications  Topiramate 75 mg daily     Reports that he was out of Ozempic for a little while due to insurance issues. Restarted Ozempic a couple weeks ago. Has seen a \"slight decrease\" in sugars since restarting. Previously used Berta for blood sugar monitoring but stopped, he can't remember why. Now testing with glucose monitor 2 times daily. He is unsure if he is noticing effectiveness of Topiramate or Ozempic, but doesn't wish to stop due to success with weight loss to get to goal weight for surgery. Highest weight of 711 lb, 667 lb when [facility] with [facility].   Side effects: nausea.   Blood sugar monitorin time(s) daily (after lunch and bedtime). Ranges (patient reported): 200 or more   Symptoms of low blood sugar? none  Symptoms of high blood sugar? none  Diet/Exercise: Getting in 3 meals per day  Aspirin: No  Statin: Yes: atorvastatin 10 mg daily   ACEi/ARB: No.   Urine Albumin: No results found for: [facility] Everywhere 2023: A1c 8.2%     Initial Consult Weight: 667 " "lb  Goal Weight for Surgery: 567 lb (-100 lb)     Wt Readings from Last 4 Encounters:   05/04/23 (!) 530 lb (240.4 kg)   03/27/23 (!) 526 lb (238.6 kg)   03/23/23 (!) 526 lb (238.6 kg)   12/01/22 (!) 547 lb (248.1 kg)     Estimated body mass index is 64.51 kg/m  as calculated from the following:    Height as of 5/4/23: 6' 4\" (1.93 m).    Weight as of 5/4/23: 530 lb (240.4 kg).    Hyperlipidemia:   Atorvastatin 10mg daily.      Patient reports no significant myalgias or other side effects.    Ref Range & Units 12/03/2022    Cholesterol <200 mg/dL 111    Triglyceride <150 mg/dL 137    HDL >=40 mg/dL 30 Low     LDL <=100 mg/dL 54      HFrEF/Hypertension:   Metoprolol tartrate 25 mg twice daily   Bumetanide 1 mg daily in AM     Patient reports no current medication side effects.     ECHO:  Date 3/14/2021, EF 55%  Patient is not complaining of HF symptoms other than some lower extremity swelling. Minor pitting \"for a couple seconds\".   Patient is not measuring daily weights.   Patient does not self-monitor blood pressure.   Patient is not following a low sodium diet, is avoiding EtOH.    Supplements:   Vitamin D 2000 international unit(s) daily   Multivitamin daily     Has been on longstanding for general health and knows he needed to be on them post bariatric surgery.     Iron Deficiency Anemia:   Ferrous sulfate 160 mg (50 mg Fe) daily    No concerns or medication side effects.   Care Everywhere 3/22/2023 Hemoglobin 11.7     Depression/Panic Disorder with Agoraphobia:   Duloxetine 60 mg once daily  Buspirone 10 mg twice daily   Hydroxyzine 50 mg four times per day as needed     Reports that anxiety is higher as of now. Frustrated with the amount of items that he is having to do before surgery. Has a longstanding history of anxiety, including social anxieties, panic disorder with agoraphobia.    Usually taking hydroxyzine as needed at night to \"quiet the mind\".       1/10/2022    11:07 AM   PHQ   PHQ-9 Total Score 10 " "  Q9: Thoughts of better off dead/self-harm past 2 weeks Not at all          View : No data to display.              Insomnia:   Melatonin 5 mg nightly   Trazodone 50 mg nightly     Finds that sleep is \"okay\" as of now. Uses both medications nightly. Previously was using zolpidem but ineffective. No medication side effects.     Pain:   Gabapentin 300 mg daily noon  Tramadol 25 mg nightly   Acetaminophen 650 mg every 4 hours as needed  Cyclobenzaprine 5 mg three times daily as needed     Using for knee pain. He has decreased gabapentin to current dose. Unsure why the gabapentin dose has been decreased over time. Reports rarely uses cyclobenzaprine.      SOB:   Albuterol HFA as needed    Reports that he does not have diagnosis of asthma, but utilizes as needed for SOB. Finds helpful when needed. Does not use often.     *After end of visit, discovered also taking topiramate for migraine. Therefore important to stay on topiramate post op for now to ensure control of migraines. Can reassess in future.   ----------------    I spent 45 minutes with this patient today. All changes were made via collaborative practice agreement with Dr. Campos. A copy of the visit note was provided to the patient's provider(s).    A summary of these recommendations was sent via [employer].    Lauren Bloch, PharmD, BCACP   Medication Therapy Management Pharmacist   [facility]    Telemedicine Visit Details  Type of service:  Telephone visit  Start Time: 2:10 PM  End Time: 2:55 PM     Medication Therapy Recommendations  Other chronic pain    Current Medication: gabapentin (NEURONTIN) 300 MG capsule   Rationale: Cannot swallow/administer medication - Adherence - Adherence   Recommendation: Provide Adherence Intervention   Status: Accepted - no CPA Needed         Takes dietary supplements    Current Medication: Multiple Vitamins-Minerals (MULTI VITAMIN  /MINERALS) TABS   Rationale: Cannot swallow/administer " medication - Adherence - Adherence   Recommendation: Change Medication - BARIATRIC MULTIVITAMINS/IRON PO   Status: Contact Provider - Awaiting Response         Type 2 diabetes mellitus with other specified complication, with long-term current use of insulin (H)    Current Medication: insulin glargine (LANTUS SOLOSTAR) 100 UNIT/ML pen   Rationale: Does not understand instructions - Adherence - Adherence   Recommendation: Provide Education   Status: Patient Agreed - Adherence/Education

## 2023-05-16 NOTE — PATIENT INSTRUCTIONS
"Recommendations from today's MTM visit:                                                    MTM (medication therapy management) is a service provided by a clinical pharmacist designed to help you get the most of out of your medicines.   Today we reviewed what your medicines are for, how to know if they are working, that your medicines are safe and how to make your medicine regimen as easy as possible.      1. Test blood sugars twice daily - AM fasting and bedtime - please send blood sugar readings back to pharmacist via [employer] in 1 week.     After Surgery:   1. No NSAIDs (Non-Steroidal Anti-Inflammatory Drugs) after Sleeve Gastrectomy - meaning no ibuprofen, naproxen, Aleve, Advil, Motrin post-operatively - these medications can negatively impact the gut lining. Can use acetaminophen post op. Do not exceed 4000 mg/24 hour period.   -Oral dissolve powder Tylenol (acetaminophen) packets are available for use at retail stores or amazon - each packet contains 500 mg acetaminophen. Can use this formulation for the first 3 months post op. Otherwise can cut acetaminophen tablets in half and swallow both halves for dose.      2. Gabapentin, Duloxetine: Open capsule and sprinkle contents over 1 tablespoon of applesauce, yogurt or sugar free pudding and swallow immediately (do not chew).     3. Change from multivitamin to bariatric chewable multivitamin post op.    Follow-up: Return in about 8 weeks (around 7/10/2023) for Medication Therapy Management Pharmacist Visit, (scheduled today).    It was great speaking with you today.  I value your experience and would be very thankful for your time in providing feedback in our clinic survey. In the next few days, you may receive an email or text message from [employer] with a link to a survey related to your  clinical pharmacist.\"     To schedule another appointment with your MTM pharmacist, please call [facility] Scheduling at [phone]. "     My Clinical Pharmacist's contact information:                                                      Please feel free to contact me with any questions or concerns you have.      Lauren Bloch, PharmD  Medication Therapy Management Pharmacist   [facility] [place]

## 2023-05-20 ENCOUNTER — HEALTH MAINTENANCE LETTER (OUTPATIENT)
Age: 43
End: 2023-05-20

## 2023-05-22 ENCOUNTER — VIRTUAL VISIT (OUTPATIENT)
Dept: ENDOCRINOLOGY | Facility: CLINIC | Age: 43
End: 2023-05-22
Payer: MEDICARE

## 2023-05-22 VITALS — HEIGHT: 76 IN | BODY MASS INDEX: 38.36 KG/M2 | WEIGHT: 315 LBS

## 2023-05-22 DIAGNOSIS — E11.9 TYPE 2 DIABETES MELLITUS WITHOUT COMPLICATION, WITHOUT LONG-TERM CURRENT USE OF INSULIN (H): ICD-10-CM

## 2023-05-22 DIAGNOSIS — E66.01 MORBID OBESITY (H): ICD-10-CM

## 2023-05-22 DIAGNOSIS — Z71.3 NUTRITIONAL COUNSELING: Primary | ICD-10-CM

## 2023-05-22 PROCEDURE — 99207 PR NO CHARGE LOS: CPT | Mod: GA | Performed by: DIETITIAN, REGISTERED

## 2023-05-22 PROCEDURE — 97803 MED NUTRITION INDIV SUBSEQ: CPT | Mod: GA | Performed by: DIETITIAN, REGISTERED

## 2023-05-22 ASSESSMENT — PAIN SCALES - GENERAL: PAINLEVEL: SEVERE PAIN (6)

## 2023-05-22 NOTE — LETTER
"5/22/2023       RE: Max Meade  [address]  [facility] [address]     Dear Colleague,    Thank you for referring your patient, Max Meade, to the [facility] [place] at [facility]. Please see a copy of my visit note below.    Max Meade is a 42 year old male who is being evaluated via a billable video visit.      The patient has been notified of following:     \"This video visit will be conducted via a call between you and your physician/provider. We have found that certain health care needs can be provided without the need for an in-person physical exam.  This service lets us provide the care you need with a video conversation.  If a prescription is necessary we can send it directly to your pharmacy.  If lab work is needed we can place an order for that and you can then stop by our lab to have the test done at a later time.    Video visits are billed at different rates depending on your insurance coverage.  Please reach out to your insurance provider with any questions.    If during the course of the call the physician/provider feels a video visit is not appropriate, you will not be charged for this service.\"    Patient has given verbal consent for Video visit? Yes  How would you like to obtain your AVS? MyChart  Will anyone else be joining your video visit? No  {If patient encounters technical issues they should call [phone]      Video-Visit Details    Type of service:  Video Visit    Video Start Time: 1:58 PM  Video End Time: 2:20 PM    Originating Location (pt. Location): Home    Distant Location (provider location):  [facility] [place]     Platform used for Video Visit: Heckyl    During this virtual visit the patient is located in [place], patient verifies this as the location during the entirety of this visit.     Pt signed Medicare ABN form which is good for 1 year " "(8/29/22 - 8/29/23). Scanned into chart 8/30/22.      Weight Management Nutrition Consultation    Max Meade is a 42 year old male presents today for return bariatric consultation. Pt is interested in sleeve gastrectomy with Dr. Campos expected surgery date on TBD.  Patient referred by Dr. Hdz and Sherice Schmid PA-C.     Patient with Co-morbidities of obesity including:  Type II DM yes   Renal Failure no  Sleep apnea yes  Hypertension no   Dyslipidemia no  Joint pain yes  Back pain yes  GERD no     Anthropometrics:  Goal weight for weight loss surgery: 557 lbs or less day of surgery     Current weight: Unknown - Now that pt is home, he does not have a way to check/monitor his weight. Plan to have weight checked when going in for cardiology eval.     Last filed vital signs while admitted:   527 lb 04/11/2023 9:37 AM CDT       Initial MWM consult weight/BMI:  Estimated body mass index is 81.19 kg/m  as calculated from the following:    Height as of an earlier encounter on 12/27/21: 1.93 m (6' 4\").    Weight as of an earlier encounter on 12/27/21: 302.5 kg (667 lb).    Per notes from [facility] Bariatric RD:  12/13/21 (!) 293.8 kg (647 lb 12.8 oz)   12/08/21 (!) 287.9 kg (634 lb 12.8 oz)   09/27/21 (!) 292.2 kg (644 lb 3.2 oz)     Admitted in March 2021. Notes he started working on weight loss at this time. Weight per care everywhere:   321 kg (707 lb 10.8 oz) 03/18/2021        Medications for Weight Loss:  Ozempic  Topiramate    NUTRITION HISTORY  See previous RD notes for full nutrition history.    HgbA1c 8.2 on 5/8/23, unchanged from 8.2 on 2/19/23.     Pt reports being consistent with meal replacements. He is consuming 3 meals daily, replacing 1-2 meals with Healthy Choice Power Bowls, and using protein shake between meals 2-3 times daily. He is practicing portion control with cookies as evening snack, portioning out for 3 at a time when historically would eat the whole bag.     Recent Diet " Recall:  Breakfast: sausage, egg and cheese muffins and oatmeal  AM snack: protein shake  Lunch: Healthy Choice Power Bowls    PM snack: protein shake  Dinner: Health Choice Power Bowl (high-protein); chicken salad sandwich or with wheat thins; sweet potato fries with cinnamon/nutmeg   HS snack: 3 Rebecca Doone Cookies (15 gm carb) and occ protein shake  Beverages: Unsweet green tea (16 oz/day), water (32-48 oz/day), Gatorade Zero (12 oz/day)   Dining Out: More since being home, something     Physical Activity:  PT coming to his home once per week. Pt reports being able to do 6-8 sit-stands and is able to walk to the fridge now, which is an improvement from last week. He is working on walking to the bathroom next.     Progress Towards Previous Goals:  1) Follow 1400 calorie/day plan - Improved this month, getting back to using meal replacements more consistently.   2) Aim for less than 60 gm carb per meal and less than 15 gm carb per snack - Met, continues  3) Eat very slowly, chewing thoroughly. Take 20-30 mins to eat. - Not met, taking 10 mins to eat meals.   4) Work to maintain consistency with exercise sessions. Keep up with walking routine with PT. - Improving, PT coming once per week   5) Avoid dining-out/take-out - Met, continues. Has only gone 4 times since per being home     Nutrition Prescription  Recommended energy/nutrient modification.  1500 calories/day or less (per MD)    Nutrition Diagnosis  Obesity r/t long history of positive energy balance aeb BMI >30. - improving/continues    Nutrition Intervention  Materials/education provided:  - Reviewed progress towards previous goals  - Reviewed dietary strategy for weight loss and management of type 2 DM - continuing hypocaloric and low/moderate carb meal plan as established.   - Discussed need to be mobile for surgery. Encouraged consistency with exercise sessions and PT.   - Reviewed updated tasks required for surgery.   - Provided pt with list of goals and  resources below via AVS/Joboolhart and mail.    Patient demonstrates understanding.    Expected Engagement: good    Nutrition Goals  1) Follow 1400 calorie/day plan:   - Aim for meals that are 300 calories or less. Eat 3 meals daily   - Drink 2-3 protein shakes daily as snack   - Limit one additional snack daily, portion out for 150 calories or less.   2) Aim for less than 60 gm carb per meal and less than 15 gm carb per snack  3) Eat very slowly, chewing thoroughly. Take 20-30 mins to eat.   4) Work to maintain consistency with exercise sessions. Keep up with walking routine with PT.   5) Avoid drinking a beverage with meals and for 30 mins before and after.     Meal Plan (1400 ronna/day):  Breakfast: Jose Eisenberg (300 ronna)  AM Snack: Protein shake (160 ronna)  Lunch: Healthy Choice Power Bowl (160-330 ronna)  Snack: Protein shake (160)   Dinner: 3 oz baked chicken/fish/turkey + 1.5 cup veggies + 1/2 cup whole grain or starchy vegetable; Healthy Choice Power Bowl (300 ronna)  Snack: Protein Shake  (160 ronna)    Protein Bar Options:  Quest Protein Bars (190 Calories, 20 g protein)  Built Bar (170 Calories, 15-20 g protein)  One Protein Bar (210 calories, 20 g protein)  Juares Signature Protein Bar (Costco) (190 Calories, 21 g protein)  Pure Protein Bars (180 Calories, 21 g protein)    Low Calorie Frozen Meal:  Healthy Choice Power Bowls  Lean Cuisine  Smart Ones  Jose Eisenberg    Post-op Diet Handouts:  Diet Guidelines after Weight-loss Surgery  [email or web address]     Your Stage 1 Diet: Clear Liquids  [email or web address]     Your Stage 2 Diet: Low-fat Full Liquids  [email or web address]     Your Stage 3 Diet: Pureed Foods  [email or web address]     Pureed Recipes  [email or web address]    Your Stage 4 Diet: Soft Foods  [email or web address]    Your Stage 5 Diet: Regular Foods  [email or web address]    Supplements after Sleeve Gastrectomy, Gastric Bypass  "or Single Anastomosis Duodenal Switch  [email or web address]    Keeping Track of Fluids  [email or web address]      Tools for after surgery:  [employer] Dish Sets: bariatric meal size bowls and plates with built in measurement designs on the dishes    Bariatric Pal: https://store.cFares.Cube Biotech/collections/bariatric-dinnerware    Books:  \"Fresh Start Bariatric Cookbook\" by Matilde Armenta, MS, RDN, CD - Excellent cookbook with post-op recipes and many other resources to check out for ongoing support after surgery    \"Eating Well After Weight Loss Surgery\" by Leatha Mendoza and Jorge Alberto Mcgee-Yolanda - Great cookbook with recipes for each diet stage after surgery and recommended portion sizes. Slightly more intensive cooking recipes.    \"Bariatric Mindset Success\" by Priscila Valle - book that helps with prevention of weight regain after surgery. Helps train your brain for long term success with weight loss after surgery.    Apps:  Pubster jodi -  fluid and nutritional tracking. Also has bariatric recipes.     Post-Bariatric Surgery Online Recipe Resources:  Online:  Simpler Recipes: https://www.Citybot/bariatric-surgery/recipes  https://www.Oomnitza/bariatric-recipes/  https://Photographic Museum [employer] Humanity/bariatric-recipes/   Some recipes on this site have larger than 1 cup portion size pictures: http://www.[facility].org/weight-loss-surgery/nutrition/recipe-corner.html   https://www.foodcoCanpages.me/25-bariatric-friendly-weeknight-meals/  Crockpot recipes: https://www.foodcoCanpages.me/25-bariatric-friendly-crockpot-recipes/  https://Paperless Transaction Management.Cube Biotech/recipes/   https://www.Nallatech.Cube Biotech/mbd-recipes  The World According to Manifest Digital Blog: https://thejorge albertoldaccordingtokristineace.Lâ€™ArcoBaleno.Cube Biotech/        Follow-Up:  6/20/23    Time spent with patient: 22 minutes.  Blossom Caal RD, LD    "

## 2023-05-22 NOTE — PROGRESS NOTES
"Max Meade is a 42 year old male who is being evaluated via a billable video visit.      The patient has been notified of following:     \"This video visit will be conducted via a call between you and your physician/provider. We have found that certain health care needs can be provided without the need for an in-person physical exam.  This service lets us provide the care you need with a video conversation.  If a prescription is necessary we can send it directly to your pharmacy.  If lab work is needed we can place an order for that and you can then stop by our lab to have the test done at a later time.    Video visits are billed at different rates depending on your insurance coverage.  Please reach out to your insurance provider with any questions.    If during the course of the call the physician/provider feels a video visit is not appropriate, you will not be charged for this service.\"    Patient has given verbal consent for Video visit? Yes  How would you like to obtain your AVS? MyChart  Will anyone else be joining your video visit? No  {If patient encounters technical issues they should call [phone]      Video-Visit Details    Type of service:  Video Visit    Video Start Time: 1:58 PM  Video End Time: 2:20 PM    Originating Location (pt. Location): Home    Distant Location (provider location):  [facility] [place]     Platform used for Video Visit: Solar Nation    During this virtual visit the patient is located in [place], patient verifies this as the location during the entirety of this visit.     Pt signed Medicare ABN form which is good for 1 year (8/29/22 - 8/29/23). Scanned into chart 8/30/22.      Weight Management Nutrition Consultation    Max Meade is a 42 year old male presents today for return bariatric consultation. Pt is interested in sleeve gastrectomy with Dr. Campos expected surgery date on TBD.  Patient referred by Dr. Hdz and Sherice Schmid, " "RODNEY.     Patient with Co-morbidities of obesity including:  Type II DM yes   Renal Failure no  Sleep apnea yes  Hypertension no   Dyslipidemia no  Joint pain yes  Back pain yes  GERD no     Anthropometrics:  Goal weight for weight loss surgery: 557 lbs or less day of surgery     Current weight: Unknown - Now that pt is home, he does not have a way to check/monitor his weight. Plan to have weight checked when going in for cardiology eval.     Last filed vital signs while admitted:   527 lb 04/11/2023 9:37 AM CDT       Initial MWM consult weight/BMI:  Estimated body mass index is 81.19 kg/m  as calculated from the following:    Height as of an earlier encounter on 12/27/21: 1.93 m (6' 4\").    Weight as of an earlier encounter on 12/27/21: 302.5 kg (667 lb).    Per notes from [facility] B[facility] RD:  12/13/21 (!) 293.8 kg (647 lb 12.8 oz)   12/08/21 (!) 287.9 kg (634 lb 12.8 oz)   09/27/21 (!) 292.2 kg (644 lb 3.2 oz)     Admitted in March 2021. Notes he started working on weight loss at this time. Weight per care everywhere:   321 kg (707 lb 10.8 oz) 03/18/2021        Medications for Weight Loss:  Ozempic  Topiramate    NUTRITION HISTORY  See previous RD notes for full nutrition history.    HgbA1c 8.2 on 5/8/23, unchanged from 8.2 on 2/19/23.     Pt reports being consistent with meal replacements. He is consuming 3 meals daily, replacing 1-2 meals with Healthy Choice Power Bowls, and using protein shake between meals 2-3 times daily. He is practicing portion control with cookies as evening snack, portioning out for 3 at a time when historically would eat the whole bag.     Recent Diet Recall:  Breakfast: sausage, egg and cheese muffins and oatmeal  AM snack: protein shake  Lunch: Healthy Choice Power Bowls    PM snack: protein shake  Dinner: Health Choice Power Bowl (high-protein); chicken salad sandwich or with wheat thins; sweet potato fries with cinnamon/nutmeg   HS snack: 3 Rebecca Doone Cookies (15 gm carb) " and occ protein shake  Beverages: Unsweet green tea (16 oz/day), water (32-48 oz/day), Gatorade Zero (12 oz/day)   Dining Out: More since being home, something     Physical Activity:  PT coming to his home once per week. Pt reports being able to do 6-8 sit-stands and is able to walk to the fridge now, which is an improvement from last week. He is working on walking to the bathroom next.     Progress Towards Previous Goals:  1) Follow 1400 calorie/day plan - Improved this month, getting back to using meal replacements more consistently.   2) Aim for less than 60 gm carb per meal and less than 15 gm carb per snack - Met, continues  3) Eat very slowly, chewing thoroughly. Take 20-30 mins to eat. - Not met, taking 10 mins to eat meals.   4) Work to maintain consistency with exercise sessions. Keep up with walking routine with PT. - Improving, PT coming once per week   5) Avoid dining-out/take-out - Met, continues. Has only gone 4 times since per being home     Nutrition Prescription  Recommended energy/nutrient modification.  1500 calories/day or less (per MD)    Nutrition Diagnosis  Obesity r/t long history of positive energy balance aeb BMI >30. - improving/continues    Nutrition Intervention  Materials/education provided:  - Reviewed progress towards previous goals  - Reviewed dietary strategy for weight loss and management of type 2 DM - continuing hypocaloric and low/moderate carb meal plan as established.   - Discussed need to be mobile for surgery. Encouraged consistency with exercise sessions and PT.   - Reviewed updated tasks required for surgery.   - Provided pt with list of goals and resources below via AVS/Suo Yihart and mail.    Patient demonstrates understanding.    Expected Engagement: good    Nutrition Goals  1) Follow 1400 calorie/day plan:   - Aim for meals that are 300 calories or less. Eat 3 meals daily   - Drink 2-3 protein shakes daily as snack   - Limit one additional snack daily, portion out for 150  calories or less.   2) Aim for less than 60 gm carb per meal and less than 15 gm carb per snack  3) Eat very slowly, chewing thoroughly. Take 20-30 mins to eat.   4) Work to maintain consistency with exercise sessions. Keep up with walking routine with PT.   5) Avoid drinking a beverage with meals and for 30 mins before and after.     Meal Plan (1400 ronna/day):  Breakfast: Jose Eisenberg (300 ronna)  AM Snack: Protein shake (160 ronna)  Lunch: Healthy Choice Power Bowl (160-330 ronna)  Snack: Protein shake (160)   Dinner: 3 oz baked chicken/fish/turkey + 1.5 cup veggies + 1/2 cup whole grain or starchy vegetable; Healthy Choice Power Bowl (300 ronna)  Snack: Protein Shake  (160 ronna)    Protein Bar Options:  Quest Protein Bars (190 Calories, 20 g protein)  Built Bar (170 Calories, 15-20 g protein)  One Protein Bar (210 calories, 20 g protein)  [place] Signature Protein Bar ([employer]co) (190 Calories, 21 g protein)  Pure Protein Bars (180 Calories, 21 g protein)    Low Calorie Frozen Meal:  Healthy Choice Power Bowls  Lean Cuisine  Smart Ones  Jose Eisenberg    Post-op Diet Handouts:  Diet Guidelines after Weight-loss Surgery  [email or web address]     Your Stage 1 Diet: Clear Liquids  [email or web address]     Your Stage 2 Diet: Low-fat Full Liquids  [email or web address]     Your Stage 3 Diet: Pureed Foods  [email or web address]     Pureed Recipes  [email or web address]    Your Stage 4 Diet: Soft Foods  [email or web address]    Your Stage 5 Diet: Regular Foods  [email or web address]    Supplements after Sleeve Gastrectomy, Gastric Bypass or Single Anastomosis Duodenal Switch  [email or web address]    Keeping Track of Fluids  [email or web address]      Tools for after surgery:  Memorop Dish Sets: bariatric meal size bowls and plates with built in measurement designs on the dishes    Bariatric Pal:  "https://store.bariatricLab42.Micro Interventional Devices/collections/bariatric-dinnerware    Books:  \"Fresh Start Bariatric Cookbook\" by Matilde Armenta, MS, RDN, CD - Excellent cookbook with post-op recipes and many other resources to check out for ongoing support after surgery    \"Eating Well After Weight Loss Surgery\" by Leatha Mendoza and Jorge Alberto Chowdhury - Great cookbook with recipes for each diet stage after surgery and recommended portion sizes. Slightly more intensive cooking recipes.    \"Bariatric Mindset Success\" by Priscila Valle - book that helps with prevention of weight regain after surgery. Helps train your brain for long term success with weight loss after surgery.    Apps:  AdRoll jodi -  fluid and nutritional tracking. Also has bariatric recipes.     Post-Bariatric Surgery Online Recipe Resources:  Online:    Simpler Recipes: https://www.Celotor/bariatric-surgery/recipes    https://www.Kiwi Crate/bariatric-recipes/    https://LATTO/bariatric-recipes/     Some recipes on this site have larger than 1 cup portion size pictures: http://www.[facility].org/weight-loss-surgery/nutrition/recipe-corner.html     https://www.Urvew.me/25-bariatric-friendly-weeknight-meals/    Crockpot recipes: https://www.Urvew.me/25-bariatric-friendly-crockpot-recipes/    https://DecideQuick.Micro Interventional Devices/recipes/     https://www.Local Reputation.Micro Interventional Devices/mbd-recipes    The World According to EggResoomayce Blog: https://theworldaccordingtoeggface.Agitar.com/        Follow-Up:  6/20/23    Time spent with patient: 22 minutes.  Blossom Caal RD, LD  "

## 2023-05-22 NOTE — PATIENT INSTRUCTIONS
Joe Santana,    Follow-up with RD on 6/20    Thank you,    Blossom Caal, RD, LD  If you would like to schedule or reschedule an appointment with the RD, please call [phone]    Nutrition Goals  1) Follow 1400 calorie/day plan:   - Aim for meals that are 300 calories or less. Eat 3 meals daily   - Drink 2-3 protein shakes daily as snack   - Limit one additional snack daily, portion out for 150 calories or less.   2) Aim for less than 60 gm carb per meal and less than 15 gm carb per snack  3) Eat very slowly, chewing thoroughly. Take 20-30 mins to eat.   4) Work to maintain consistency with exercise sessions. Keep up with walking routine with PT.   5) Avoid drinking a beverage with meals and for 30 mins before and after.     Meal Plan (1400 ronna/day):  Breakfast: Jose Eisenberg (300 ronna)  AM Snack: Protein shake (160 ronna)  Lunch: Healthy Choice Power Bowl (160-330 ronna)  Snack: Protein shake (160)   Dinner: 3 oz baked chicken/fish/turkey + 1.5 cup veggies + 1/2 cup whole grain or starchy vegetable; Healthy Choice Power Bowl (300 ronna)  Snack: Protein Shake  (160 ronna)    Protein Bar Options:  Quest Protein Bars (190 Calories, 20 g protein)  Built Bar (170 Calories, 15-20 g protein)  One Protein Bar (210 calories, 20 g protein)  Juares Signature Protein Bar ([employer]) (190 Calories, 21 g protein)  Pure Protein Bars (180 Calories, 21 g protein)    Low Calorie Frozen Meal:  Healthy Choice Power Bowls  Lean Cuisine  Smart Ones  Jose Eisenberg    Post-op Diet Handouts:  Diet Guidelines after Weight-loss Surgery  [email or web address]     Your Stage 1 Diet: Clear Liquids  [email or web address]     Your Stage 2 Diet: Low-fat Full Liquids  [email or web address]     Your Stage 3 Diet: Pureed Foods  [email or web address]     Pureed Recipes  [email or web address]    Your Stage 4 Diet: Soft Foods  [email or web address]    Your Stage 5 Diet: Regular  "Foods  [email or web address]    Supplements after Sleeve Gastrectomy, Gastric Bypass or Single Anastomosis Duodenal Switch  [email or web address]    Keeping Track of Fluids  [email or web address]      Tools for after surgery:  BizArk Dish Sets: bariatric meal size bowls and plates with built in measurement designs on the dishes    Bariatric Pal: https://store.Maps InDeed.Celulares.com/collections/bariatric-dinnerware    Books:  \"Fresh Start Bariatric Cookbook\" by Matilde Armenta, MS, RDN, CD - Excellent cookbook with post-op recipes and many other resources to check out for ongoing support after surgery    \"Eating Well After Weight Loss Surgery\" by Leatha Mendoza and Jorge Alberto Mcgee-Yolanda - Great cookbook with recipes for each diet stage after surgery and recommended portion sizes. Slightly more intensive cooking recipes.    \"Bariatric Mindset Success\" by Priscila Valle - book that helps with prevention of weight regain after surgery. Helps train your brain for long term success with weight loss after surgery.    Apps:  RubyRide jodi -  fluid and nutritional tracking. Also has bariatric recipes.     Post-Bariatric Surgery Online Recipe Resources:  Online:  Simpler Recipes: https://www.SCVNGR/bariatric-surgery/recipes  https://www.Metrigo/bariatric-recipes/  https://Avenso.Celulares.com/bariatric-recipes/   Some recipes on this site have larger than 1 cup portion size pictures: http://www.muschealth.org/weight-loss-surgery/nutrition/recipe-corner.html   https://www.IM-Sense.me/25-bariatric-friendly-weeknight-meals/  Crockpot recipes: https://www.IM-Sense.me/25-bariatric-friendly-crockpot-recipes/  https://[employer].Celulares.com/recipes/   https://www.[employer].Celulares.com/mbd-recipes  The World According to [employer] Blog: https://thebuddyingashley.Bookmycab.Celulares.com/        COMPREHENSIVE WEIGHT MANAGEMENT PROGRAM  VIRTUAL SUPPORT GROUPS    For Support Group Information:      We " "offer support groups for patients who are working on weight loss and considering, preparing for or have had weight loss surgery.   There is no cost for this opportunity.  You are invited to attend the?Virtual Support Groups?provided by any of the following locations:    [facility] via Microsoft Teams with Sarah Merritt RN  2.   [place] via [employer] Teams with Obed Davenport, PhD, LP  3.   [place] via Speedyboy with Gabi Winchester RN  4.   [facility] via [employer] Teams with Gabi Walters [facility]    The following Support Group information can also be found on our website:  https://www.[facility].org/treatments/weight-loss-surgery-support-groups    https://www.[employer].org/treatments/weight-loss-and-weight-loss-surgery-support-groups    [facility] Weight Loss Surgery Support Group    [facility] Weight Loss Surgery Support Group  The support group is a patient-lead forum that meets monthly to share experiences, encouragement and education. It is open to those who have had weight loss surgery, are scheduled for surgery, or are considering surgery.   WHEN: This group meets on the 3rd Wednesday of each month from 5:00PM - 6:00PM virtually using Microsoft Teams.   FACILITATOR: Led by Sarah Ceja RD, MORE, RN, the program's Clinical Coordinator.   TO REGISTER: Please contact the clinic via [employer]Better or call the nurse line directly at [phone] to inform our staff that you would like an invite sent to you and to let us know the email you would like the invite sent to. Prior to the meeting, a link with directions on how to join the meeting will be sent to you.    2023 Meetings   April 19: Guest Speaker, Zay Gabriel RD, LD, \"Maintaining Weight Loss after Bariatric Surgery\".  May 17: \"Let's Talk\" a time for the group to share.  June 21: \"Let's Talk\" a time for the group to share.  July 19  August 16  September 20  October 18  November 15  December 20    [facility] " "[facility] Support Groups    Connections Bariatric Care Support Group?  This is open to all pre- and post- operative bariatric surgery patients as well as their support system.   WHEN: This group meets the 2nd Tuesday of each month from 6:30 PM - 8:00 PM virtually using Microsoft Teams.   FACILITATOR: Led by Obed Davenport, Ph.D who is a Licensed Psychologist with the [facility] Comprehensive Weight Management Program.   TO REGISTER: Please send an email to Obed Davenport, Ph.D., LP at?[email or web address]?if you would like an invitation to the group and to learn about using Microsoft Teams.    2023 Meetings  April 11: Guest speaker, Yuko Metcalf MD, Bariatrician, [facility] [place] [facility], \"Injectable Weight Loss Medications\".  May 9  Geraldine 13  July 11  August 8  September 12  October 10  November 14  December 12    Connections Post-Operative Bariatric Surgery Support Group  This is a support group for [facility] bariatric patients (and those external to [facility]) who have had bariatric surgery and are at least 3 months post-surgery.  WHEN: This support group meets the 4th Wednesday of the month from 11:00 AM - 12:00 PM virtually using Microsoft Teams.   FACILITATOR: Led by Certified Bariatric Nurse, Gabi Winchester RN.   TO REGISTER: Please send an email to Gabi at [email or web address] if you would like an invitation to the group and to learn about using Microsoft Teams.    2023 Meetings  April 26  May 24  Geraldine 28  July 26  August 23  September 27  October 25  November 22  December 27    [facility]   Healthy Lifestyle Virtual Support Group    Healthy Lifestyle Virtual Support Group?  This is 60 minutes of small group guided discussion, support and resources. All are welcome who want a healthy lifestyle.  WHEN: This group meets monthly on a Friday from 12:30 PM - 1:30 PM " virtually using Microsoft Teams.  This group will meet the first Friday of the month beginning In July  FACILITATOR: Led by National Board Certified Health and , Gabi Walters [facility].   TO REGISTER: Please send an email to Gabi at?[email or web address] to receive monthly invites to the group or if you have any questions about having a health .  Prior to the meeting, a link with directions on how to join the meeting will be sent to you.    2023 Meetings  May 19: Let's Talk  June 9: Create Your Coaching Toolkit: Learn How to  Yourself  July 7: Let's Talk  August 4: Benefits of Fiber with JOSEPHINE Trinidad  September 1: Show and Tell (share your aps, podcasts, recipes, hacks, books)  October 6 :Let's Talk  November 3: Introduction to Mindfulness   December 1: Let's Talk

## 2023-05-22 NOTE — NURSING NOTE
Is the patient currently in the state of [place]? YES    Visit mode:VIDEO    If the visit is dropped, the patient can be reconnected by: VIDEO VISIT: Text to cell phone: [phone]    Will anyone else be joining the visit? NO      How would you like to obtain your AVS? MyChart    Are changes needed to the allergy or medication list? NO    Reason for visit: DONNELL Hollins

## 2023-06-29 ENCOUNTER — TELEPHONE (OUTPATIENT)
Dept: PHARMACY | Facility: CLINIC | Age: 43
End: 2023-06-29
Payer: MEDICARE

## 2023-08-13 ENCOUNTER — HEALTH MAINTENANCE LETTER (OUTPATIENT)
Age: 43
End: 2023-08-13

## 2023-09-11 ENCOUNTER — CARE COORDINATION (OUTPATIENT)
Dept: ENDOCRINOLOGY | Facility: CLINIC | Age: 43
End: 2023-09-11
Payer: MEDICARE

## 2023-09-11 DIAGNOSIS — E66.01 MORBID OBESITY (H): Primary | ICD-10-CM

## 2023-09-11 NOTE — PROGRESS NOTES
Tasklist updated and sent to patient via [employer].    Bariatric Task List  Fax:  Please fax all paperwork to: [phone] -     Status:  Is patient a candidate for bariatric surgery?:  patient is a candidate for bariatric surgery -     Cleared to schedule surgeon consult?:  cleared to schedule surgeon consult - 3/23/23 Dr Campos appt. Call [phone] to have a virtual visit with Dr Campos prior to surgery.  bks   Status:  surgery evaluation in process -     Surgeon: Beth -     Tentative surgery month/year: TBD -        Insurance: Insurance:  Medicare -        Patient Info: Initial Weight:  667 -     Date of Initial Weight/Height:    - 12/21   Goal Weight (lbs):  567 -     Required Weight Loss:    -     Surgery Type:  sleeve gastrectomy - Discussed with Dr Campos. bks      Dietician Visits: Structured weight loss required by insurance?:  structured weight loss required -     Dietician Visit 1:  Completed - 1/3/23 done. bks   Dietician Visit 2:  Completed - 2/23/23 done. bks   Dietician Visit 3:  Completed - 3/28/23 done. bks   Dietician Visit 4:  Completed - 4/28/23 done. bks   Dietician Visit 5:  Completed - 5/22/23 done. bks   Dietician Visit 6:  Needed - 9/15/23 appt. s   Dietician Visit additional:  Needed - Continue monthly until surgery for weight loss and postop diet teaching. [facility] [phone] to schedule. bks   Dietician Notes:  RD visits w/in last year: 3/25/22, 5/26/22, 7/5/22, 8/29/22, 9/26/22, 11/17/22 -        Psychological Evaluation: Psych eval:  Completed - Dr. Gonzalez 2/13/23, recommending letter of support from therapist - KB   Therapist letter of support:  Completed - 3/24 Fabiola Vallecillo PsyD, LP - KB      Lab Work: Complete Blood Count:  Completed - Done 12/3/22 - KB   Comprehensive Metabolic Panel:  Completed - Done 12/3/22 - KB   Vitamin D:  Completed - Done 12/3/22, WNL - KB   PTH:  Completed - Done 12/3/22, WNL - KB   Hgb A1c:  Needed - 8.9 on 12/3/22, 8.2 on 2/19/23, recheck needed 5/1/23  "- KB    Lipids: Completed - Done 12/3/22 - KB   Vitamin A: Needed -        Consults/ [facility]:  Needed - 4/12/23 Pending from Dr Harrington in [place]. bks   Cardiac:  Needed -     Other:    -  Physical Therapy Need documentation patient able to walk at least 50 ft four times a day.  Will need to walk frequently aftr surgery to prevent blood clots.   Other:    -     Other:    -        Testing: Sleep Study:   -  If ordered by sleep provider.   Other:   -     Other:    -        PCP: Establish care with PCP:  Completed - Malou Ahn NP with \"[facility].\" Fax: [phone]; Phone: [phone]   PCP letter of support:  Needed - 4/12/23 Pending from Dr Harrington in [place]. 4/19/23 clinic note with current status. bks      Patient Education:  Information Session:  Completed -     Given \"Making your decision\" handout?:  Yes -     Given \"A Roadmap to you Weight Loss Surgery\" handout?: Yes -     Given support group information?:    -  Yes   Attended support group?:    -     Support plan in place?:  Needed - need to make sure family support after surgery      Additional Surgery Requirements: Review Coag plan:    -     HgA1c <8:  Needed - Improving, 8.2 at last check on 5/8/23 - KB      Final Tasks:  Before surgery online ZOOM class:  Needed -     Nurse visit per clinic for weight check or check weight at PCP clinic:  Needed -     History and Physical per clinic:   PreAssessment Clinic     Final labs per clinic: Needed -        Notes: Please register for the Weight Loss Surgery Care Companion Pathway through the [employer] mobile jodi or via web browser. Answering a question about getting more surgery information will activate the Pathway.   Information from this Pathway can help you be more successful before surgery and for up to one year after surgery.  This Pathway through [employer] can also answer questions you may have about your surgery.    The Pathway has 3 Phases:  Phase 1 starts when you get " your Tasklist after your initial consultation with us after it is activated.  Phase 2 starts when your surgery is scheduled, if it is not activated before this time.  Phase 3 starts on the day of discharge from the hospital.    A patient can only be connected to one Care Companion journey at any given time.  You can remove or re-enroll yourself from the Weight Loss Surgery Care Companion Pathway by contacting us at [phone].     Your Weight Loss Surgery Team  [facility]  Ph:[phone]      -

## 2023-12-31 ENCOUNTER — HEALTH MAINTENANCE LETTER (OUTPATIENT)
Age: 43
End: 2023-12-31

## 2024-05-18 ENCOUNTER — HEALTH MAINTENANCE LETTER (OUTPATIENT)
Age: 44
End: 2024-05-18

## 2024-07-27 ENCOUNTER — HEALTH MAINTENANCE LETTER (OUTPATIENT)
Age: 44
End: 2024-07-27

## 2024-10-05 ENCOUNTER — HEALTH MAINTENANCE LETTER (OUTPATIENT)
Age: 44
End: 2024-10-05

## 2025-01-19 ENCOUNTER — HEALTH MAINTENANCE LETTER (OUTPATIENT)
Age: 45
End: 2025-01-19

## 2025-04-27 ENCOUNTER — HEALTH MAINTENANCE LETTER (OUTPATIENT)
Age: 45
End: 2025-04-27

## 2025-08-10 ENCOUNTER — HEALTH MAINTENANCE LETTER (OUTPATIENT)
Age: 45
End: 2025-08-10